# Patient Record
Sex: MALE | Race: WHITE | NOT HISPANIC OR LATINO | Employment: OTHER | URBAN - NONMETROPOLITAN AREA
[De-identification: names, ages, dates, MRNs, and addresses within clinical notes are randomized per-mention and may not be internally consistent; named-entity substitution may affect disease eponyms.]

---

## 2017-01-01 DIAGNOSIS — I25.10 CORONARY ARTERY DISEASE INVOLVING NATIVE HEART WITHOUT ANGINA PECTORIS, UNSPECIFIED VESSEL OR LESION TYPE: ICD-10-CM

## 2017-01-03 DIAGNOSIS — I10 ESSENTIAL HYPERTENSION: ICD-10-CM

## 2017-01-03 RX ORDER — RAMIPRIL 5 MG/1
5 CAPSULE ORAL DAILY
Qty: 90 CAPSULE | Refills: 3 | Status: SHIPPED | OUTPATIENT
Start: 2017-01-03 | End: 2018-01-06 | Stop reason: SDUPTHER

## 2017-01-03 RX ORDER — CLOPIDOGREL BISULFATE 75 MG/1
TABLET ORAL
Qty: 30 TABLET | Refills: 5 | Status: SHIPPED | OUTPATIENT
Start: 2017-01-03 | End: 2017-01-26 | Stop reason: SDUPTHER

## 2017-01-25 DIAGNOSIS — E78.2 MIXED HYPERLIPIDEMIA: ICD-10-CM

## 2017-01-25 RX ORDER — SIMVASTATIN 40 MG
TABLET ORAL
Qty: 90 TABLET | Refills: 3 | Status: SHIPPED | OUTPATIENT
Start: 2017-01-25 | End: 2018-03-06 | Stop reason: SDUPTHER

## 2017-01-26 DIAGNOSIS — I25.10 CORONARY ARTERY DISEASE INVOLVING NATIVE HEART WITHOUT ANGINA PECTORIS, UNSPECIFIED VESSEL OR LESION TYPE: ICD-10-CM

## 2017-01-26 RX ORDER — CLOPIDOGREL BISULFATE 75 MG/1
TABLET ORAL
Qty: 30 TABLET | Refills: 5 | Status: SHIPPED | OUTPATIENT
Start: 2017-01-26 | End: 2018-01-02 | Stop reason: SDUPTHER

## 2017-02-02 ENCOUNTER — OFFICE VISIT (OUTPATIENT)
Dept: OTOLARYNGOLOGY | Facility: CLINIC | Age: 82
End: 2017-02-02

## 2017-02-02 VITALS
WEIGHT: 216 LBS | DIASTOLIC BLOOD PRESSURE: 68 MMHG | BODY MASS INDEX: 33.9 KG/M2 | HEIGHT: 67 IN | TEMPERATURE: 97.8 F | RESPIRATION RATE: 19 BRPM | SYSTOLIC BLOOD PRESSURE: 130 MMHG | HEART RATE: 80 BPM

## 2017-02-02 DIAGNOSIS — D49.9 NEOPLASM: ICD-10-CM

## 2017-02-02 DIAGNOSIS — C44.91 BASAL CELL CARCINOMA: Primary | ICD-10-CM

## 2017-02-02 PROCEDURE — 99203 OFFICE O/P NEW LOW 30 MIN: CPT | Performed by: OTOLARYNGOLOGY

## 2017-02-02 RX ORDER — POTASSIUM CHLORIDE 20 MEQ/1
TABLET, EXTENDED RELEASE ORAL
COMMUNITY
Start: 2016-12-02

## 2017-02-02 RX ORDER — THEOPHYLLINE 300 MG/1
TABLET, EXTENDED RELEASE ORAL EVERY 8 HOURS
COMMUNITY

## 2017-02-02 RX ORDER — CETIRIZINE HYDROCHLORIDE 10 MG/1
TABLET ORAL
Refills: 2 | COMMUNITY
Start: 2017-01-24

## 2017-02-02 RX ORDER — SIMVASTATIN 40 MG
TABLET ORAL
COMMUNITY
Start: 2017-01-25

## 2017-02-02 RX ORDER — ALBUTEROL SULFATE 90 UG/1
AEROSOL, METERED RESPIRATORY (INHALATION) EVERY 4 HOURS
COMMUNITY

## 2017-02-02 RX ORDER — MELOXICAM 15 MG/1
TABLET ORAL
COMMUNITY

## 2017-02-02 RX ORDER — MONTELUKAST SODIUM 10 MG/1
TABLET ORAL
COMMUNITY
End: 2020-03-30 | Stop reason: SDUPTHER

## 2017-02-02 RX ORDER — CLOPIDOGREL BISULFATE 75 MG/1
TABLET ORAL
COMMUNITY
Start: 2017-01-26

## 2017-02-02 RX ORDER — OMEPRAZOLE 20 MG/1
CAPSULE, DELAYED RELEASE ORAL
COMMUNITY
End: 2017-06-08

## 2017-02-02 RX ORDER — NITROGLYCERIN 0.4 MG/1
TABLET SUBLINGUAL
COMMUNITY
Start: 2013-06-07

## 2017-02-02 RX ORDER — ALBUTEROL SULFATE 2.5 MG/3ML
SOLUTION RESPIRATORY (INHALATION)
Refills: 12 | COMMUNITY
Start: 2017-01-24

## 2017-02-02 RX ORDER — BUMETANIDE 1 MG/1
TABLET ORAL
COMMUNITY
Start: 2016-06-27

## 2017-02-02 RX ORDER — RAMIPRIL 5 MG/1
CAPSULE ORAL
COMMUNITY
Start: 2017-01-03

## 2017-02-02 RX ORDER — MULTIVIT WITH MINERALS/LUTEIN
TABLET ORAL
COMMUNITY

## 2017-02-02 RX ORDER — CARVEDILOL 12.5 MG/1
TABLET ORAL
COMMUNITY
Start: 2016-06-14

## 2017-02-02 NOTE — PROGRESS NOTES
Name:  Sly Marin  YOB: 1935  Location: [facility]  Location Address: [address] [place] [address], [facility], [address] [place] [address]  Location Phone: [phone]    Chief Complaint  Chief Complaint   Patient presents with   • Skin Lesion     BCC left cheek        History of Present Illness  The patient  is a 81 y.o. male who is referred by Kayla Barrera MD for preoperative evaluation. He complains of a lesion of the left cheek present for several months. The lesion has been biopsied and pathology demonstrated a BCCa. The patient denies bleeding, scaling and rapid enlargement.          Past Medical History   Diagnosis Date   • Allergic rhinitis    • Anosmia    • Arthritis    • Asthma    • Candidiasis of mouth    • Coronary artery disease    • Deviated nasal septum    • Gastroesophageal reflux    • Geographic tongue    • Hypercholesterolemia    • Hypertension    • Hypertrophy of nasal turbinates    • Laryngopharyngeal reflux    • Smell disorder        Past Surgical History   Procedure Laterality Date   • Coronary angioplasty with stent placement     • Coronary artery bypass graft     • Other surgical history       Elbow         Current Outpatient Prescriptions:   •  albuterol (PROVENTIL HFA;VENTOLIN HFA) 108 (90 BASE) MCG/ACT inhaler, Every 4 (Four) Hours., Disp: , Rfl:   •  albuterol (PROVENTIL) (2.5 MG/3ML) 0.083% nebulizer solution, INHALE 1 AMPUL BY INHALATION ROUTE AS NEEDED 4 TIMES PER DAY PRN, Disp: , Rfl: 12  •  aspirin 81 MG tablet, Take 81 mg by mouth daily.  , Disp: , Rfl:   •  bumetanide (BUMEX) 1 MG tablet, Take 1 tablet by mouth daily, Disp: , Rfl:   •  carvedilol (COREG) 12.5 MG tablet, Take 1 tablet by mouth 2 times daily (with meals), Disp: , Rfl:   •  cetirizine (zyrTEC) 10 MG tablet, TAKE 1 TABLET (10 MG) BY ORAL ROUTE ONCE DAILY AS NEEDED, Disp: , Rfl: 2  •  clopidogrel (PLAVIX) 75 MG tablet, TAKE 1 TABLET BY MOUTH DAILY, Disp: , Rfl:   •  meloxicam (MOBIC) 15 MG  tablet, Take 15 mg by mouth daily., Disp: , Rfl:   •  montelukast (SINGULAIR) 10 MG tablet, Take 10 mg by mouth nightly.  , Disp: , Rfl:   •  Multiple Vitamins-Minerals (CENTRUM SILVER) tablet, Take 1 tablet by mouth daily., Disp: , Rfl:   •  nitroglycerin (NITROSTAT) 0.4 MG SL tablet, Place 1 tablet under the tongue every 5 minutes as needed for Chest pain., Disp: , Rfl:   •  omeprazole (PRILOSEC) 20 MG capsule, Take 20 mg by mouth daily., Disp: , Rfl:   •  potassium chloride (KLOR-CON) 20 MEQ CR tablet, Take 1 tablet by mouth daily, Disp: , Rfl:   •  ramipril (ALTACE) 5 MG capsule, Take 1 capsule by mouth daily, Disp: , Rfl:   •  simvastatin (ZOCOR) 40 MG tablet, TAKE 1 TABLET BY MOUTH EVERY NIGHT, Disp: , Rfl:   •  theophylline (THEODUR) 300 MG 12 hr tablet, Every 8 (Eight) Hours., Disp: , Rfl:     Adhesive tape    Family History   Problem Relation Age of Onset   • Heart disease Mother    • Cancer Father        Social History     Social History   • Marital status:      Spouse name: N/A   • Number of children: N/A   • Years of education: N/A     Occupational History   • Not on file.     Social History Main Topics   • Smoking status: Former Smoker   • Smokeless tobacco: Not on file   • Alcohol use Yes      Comment: occasionally   • Drug use: Defer   • Sexual activity: Not on file     Other Topics Concern   • Not on file     Social History Narrative       Review of Systems   Constitutional: Negative.    HENT: Negative.    Eyes: Negative.    Respiratory: Negative.    Cardiovascular: Negative.    Gastrointestinal: Negative.    Endocrine: Negative.    Genitourinary: Negative.    Musculoskeletal: Negative.    Skin:        Left cheek lesion   Allergic/Immunologic: Negative.    Neurological: Negative.    Hematological: Negative.    Psychiatric/Behavioral: Negative.        Vitals:    02/02/17 1055   BP: 130/68   Pulse: 80   Resp: 19   Temp: 97.8 °F (36.6 °C)       Objective     Physical Exam     CONSTITUTIONAL:  "Morbidly obese, well-developed, alert, oriented, in no acute distress     COMMUNICATION AND VOICE: able to communicate normally, normal voice quality    HEAD: normocephalic, no lesions, atraumatic, no tenderness, no masses     FACE: appearance normal, 1.2 cm RPP the left cheek, no tenderness, no deformities, facial motion symmetric    EYES: ocular motility normal, eyelids normal, orbits normal, no proptosis, conjunctiva normal , pupils equal, round     EARS:  Hearing: hearing to conversational voice intact bilaterally   External Ears: normal bilaterally, no lesions    NOSE:  External Nose: external nasal structure normal, no tenderness on palpation, no nasal discharge, no lesions, no evidence of trauma, nostrils patent     ORAL:  Lips: upper and lower lips without lesion     NECK:  Inspection and Palpation: neck appearance normal, no masses or tenderness    CHEST/RESPIRATORY: normal respiratory effort     CARDIOVASCULAR: no cyanosis or edema     NEUROLOGICAL/PSYCHIATRIC: oriented to time, place and person, mood normal, affect appropriate, CN II-XII intact grossly    Assessment/Plan   Sly was seen today for skin lesion.    Diagnoses and all orders for this visit:    Basal cell carcinoma  Comments:  Left cheek      * Surgery not found *  No orders of the defined types were placed in this encounter.    No Follow-up on file.       Patient Instructions   The risks, benefits and options of the procedure were explained to the patient. The possiblity of a persistent cosmetic defect as well as a \"flap\" or a graft to close the defect was discussed. The patient was instructed to stop all aspirin, NSAIDs and VIT E etc.  If appropriate, clearance with primary MD or specialist will be obtained preoperatively.  The patient was scheduled for a LOCAL/MAC Anesthesia with a frozen section for margin control.    For instructions on the proper use, care and disposal of controled substances, ask you doctor or refer to the [place] Board of " Medicine website: http://kbml.[place].gov/hb1/Pages/Considerations-For-Patient-Education.aspx

## 2017-02-02 NOTE — PATIENT INSTRUCTIONS
"The risks, benefits and options of the procedure were explained to the patient. The possiblity of a persistent cosmetic defect as well as a \"flap\" or a graft to close the defect was discussed. The patient was instructed to stop all aspirin, NSAIDs and VIT E etc.  If appropriate, clearance with primary MD or specialist will be obtained preoperatively.  The patient was scheduled for a LOCAL/MAC Anesthesia with a frozen section for margin control.    For instructions on the proper use, care and disposal of controled substances, ask you doctor or refer to the [place] Board of Medicine website: http://kbml.[place].gov/hb1/Pages/Considerations-For-Patient-Education.aspx    "

## 2017-02-03 ENCOUNTER — TELEPHONE (OUTPATIENT)
Dept: OTOLARYNGOLOGY | Facility: CLINIC | Age: 82
End: 2017-02-03

## 2017-02-06 ENCOUNTER — TELEPHONE (OUTPATIENT)
Dept: OTOLARYNGOLOGY | Facility: CLINIC | Age: 82
End: 2017-02-06

## 2017-02-09 ENCOUNTER — HOSPITAL ENCOUNTER (OUTPATIENT)
Dept: GENERAL RADIOLOGY | Facility: HOSPITAL | Age: 82
Discharge: HOME | End: 2017-02-09
Admitting: OTOLARYNGOLOGY

## 2017-02-09 ENCOUNTER — APPOINTMENT (OUTPATIENT)
Dept: PREADMISSION TESTING | Facility: HOSPITAL | Age: 82
End: 2017-02-09

## 2017-02-09 VITALS
BODY MASS INDEX: 34.06 KG/M2 | SYSTOLIC BLOOD PRESSURE: 159 MMHG | WEIGHT: 217 LBS | HEART RATE: 69 BPM | DIASTOLIC BLOOD PRESSURE: 76 MMHG | RESPIRATION RATE: 18 BRPM | HEIGHT: 67 IN | OXYGEN SATURATION: 98 %

## 2017-02-09 DIAGNOSIS — D49.9 NEOPLASM: ICD-10-CM

## 2017-02-09 DIAGNOSIS — C44.91 BASAL CELL CARCINOMA: ICD-10-CM

## 2017-02-09 LAB
ALBUMIN SERPL-MCNC: 4.2 G/DL (ref 3.5–5)
ALBUMIN/GLOB SERPL: 1.4 G/DL (ref 1.1–2.5)
ALP SERPL-CCNC: 76 U/L (ref 24–120)
ALT SERPL W P-5'-P-CCNC: 34 U/L (ref 0–54)
ANION GAP SERPL CALCULATED.3IONS-SCNC: 9 MMOL/L (ref 4–13)
APTT PPP: 31.4 SECONDS (ref 24.1–34.8)
AST SERPL-CCNC: 27 U/L (ref 7–45)
BASOPHILS # BLD AUTO: 0.05 10*3/MM3 (ref 0–0.2)
BASOPHILS NFR BLD AUTO: 0.8 % (ref 0–2)
BILIRUB SERPL-MCNC: 0.5 MG/DL (ref 0.1–1)
BUN BLD-MCNC: 23 MG/DL (ref 5–21)
BUN/CREAT SERPL: 17.3 (ref 7–25)
CALCIUM SPEC-SCNC: 9.1 MG/DL (ref 8.4–10.4)
CHLORIDE SERPL-SCNC: 102 MMOL/L (ref 98–110)
CO2 SERPL-SCNC: 29 MMOL/L (ref 24–31)
CREAT BLD-MCNC: 1.33 MG/DL (ref 0.5–1.4)
DEPRECATED RDW RBC AUTO: 46.9 FL (ref 40–54)
EOSINOPHIL # BLD AUTO: 0.11 10*3/MM3 (ref 0–0.7)
EOSINOPHIL NFR BLD AUTO: 1.7 % (ref 0–4)
ERYTHROCYTE [DISTWIDTH] IN BLOOD BY AUTOMATED COUNT: 14.1 % (ref 12–15)
GFR SERPL CREATININE-BSD FRML MDRD: 52 ML/MIN/1.73
GLOBULIN UR ELPH-MCNC: 2.9 GM/DL
GLUCOSE BLD-MCNC: 142 MG/DL (ref 70–100)
HCT VFR BLD AUTO: 40.2 % (ref 40–52)
HGB BLD-MCNC: 13.2 G/DL (ref 14–18)
IMM GRANULOCYTES # BLD: 0.02 10*3/MM3 (ref 0–0.03)
IMM GRANULOCYTES NFR BLD: 0.3 % (ref 0–5)
INR PPP: 0.94 (ref 0.91–1.09)
LYMPHOCYTES # BLD AUTO: 1.46 10*3/MM3 (ref 0.72–4.86)
LYMPHOCYTES NFR BLD AUTO: 23 % (ref 15–45)
MCH RBC QN AUTO: 29.8 PG (ref 28–32)
MCHC RBC AUTO-ENTMCNC: 32.8 G/DL (ref 33–36)
MCV RBC AUTO: 90.7 FL (ref 82–95)
MONOCYTES # BLD AUTO: 0.66 10*3/MM3 (ref 0.19–1.3)
MONOCYTES NFR BLD AUTO: 10.4 % (ref 4–12)
NEUTROPHILS # BLD AUTO: 4.05 10*3/MM3 (ref 1.87–8.4)
NEUTROPHILS NFR BLD AUTO: 63.8 % (ref 39–78)
PLATELET # BLD AUTO: 146 10*3/MM3 (ref 130–400)
PMV BLD AUTO: 11.4 FL (ref 6–12)
POTASSIUM BLD-SCNC: 4.3 MMOL/L (ref 3.5–5.3)
PROT SERPL-MCNC: 7.1 G/DL (ref 6.3–8.7)
PROTHROMBIN TIME: 12.8 SECONDS (ref 11.9–14.6)
RBC # BLD AUTO: 4.43 10*6/MM3 (ref 4.8–5.9)
SODIUM BLD-SCNC: 140 MMOL/L (ref 135–145)
WBC NRBC COR # BLD: 6.35 10*3/MM3 (ref 4.8–10.8)

## 2017-02-09 PROCEDURE — 80053 COMPREHEN METABOLIC PANEL: CPT | Performed by: OTOLARYNGOLOGY

## 2017-02-09 PROCEDURE — 71020 HC CHEST PA AND LATERAL: CPT

## 2017-02-09 PROCEDURE — 85025 COMPLETE CBC W/AUTO DIFF WBC: CPT | Performed by: OTOLARYNGOLOGY

## 2017-02-09 PROCEDURE — 85610 PROTHROMBIN TIME: CPT | Performed by: OTOLARYNGOLOGY

## 2017-02-09 PROCEDURE — 93010 ELECTROCARDIOGRAM REPORT: CPT | Performed by: INTERNAL MEDICINE

## 2017-02-09 PROCEDURE — 36415 COLL VENOUS BLD VENIPUNCTURE: CPT

## 2017-02-09 PROCEDURE — 85730 THROMBOPLASTIN TIME PARTIAL: CPT | Performed by: OTOLARYNGOLOGY

## 2017-02-09 PROCEDURE — 93005 ELECTROCARDIOGRAM TRACING: CPT

## 2017-02-09 NOTE — DISCHARGE INSTRUCTIONS
DAY OF SURGERY INSTRUCTIONS        YOUR SURGEON: ***RUDDY COLEMAN     PROCEDURE: ***EXCISION LESION    DATE OF SURGERY: ***FEBRUARY 16,2017    ARRIVAL TIME: AS DIRECTED BY OFFICE    DAY OF SURGERY TAKE ONLY THESE MEDICATIONS: ***COREG            BEFORE YOU COME TO THE HOSPITAL  (Pre-op instructions)  • Do not eat, drink, smoke or chew gum after midnight the night before surgery.  This also includes no mints.  • Morning of surgery take only the medicines you have been instructed with a sip of water unless otherwise instructed  by your physician.  • Do not shave, wear makeup or dark nail polish.  • Remove all jewelry including rings.  • Leave anything you consider valuable at home.  • Leave your suitcase in the car until after your surgery.  • Bring the following with you if applicable:  o Picture ID and insurance, Medicare or Medicaid cards  o Co-pay/deductible required by insurance (cash, check, credit card)  o Medications (no narcotics) in original bottles (not a list) including all over-the-counter medications.  o Copy of advance directive, living will or power-of- documents if not brought to PAT  o CPAP or BIPAP mask and tubing  o Skin prep instruction sheet  o Relaxation aids (MP3 player, book, magazine)  • Confirm your arrival time with you surgeon the day before your surgery (surgery times are subject to change)  • On the day of surgery check in at registration located at the main entrance of the hospital.       Outpatient Surgery Guidelines, Adult  Outpatient procedures are those for which the person having the procedure is allowed to go home the same day as the procedure. Various procedures are done on an outpatient basis. You should follow some general guidelines if you will be having an outpatient procedure.  LET YOUR HEALTH CARE PROVIDER KNOW ABOUT:  · Any allergies you have.  · All medicines you are taking, including vitamins, herbs, eye drops, creams, and over-the-counter medicines.  · Previous  problems you or members of your family have had with the use of anesthetics.  · Any blood disorders you have.  · Previous surgeries you have had.  · Medical conditions you have.  RISKS AND COMPLICATIONS  Your health care provider will discuss possible risks and complications with you before surgery. Common risks and complications include:    · Problems due to the use of anesthetics.  · Blood loss and replacement (does not apply to minor surgical procedures).  · Temporary increase in pain due to surgery.  · Uncorrected pain or problems that the surgery was meant to correct.  · Infection.  · New damage.  BEFORE THE PROCEDURE  · Ask your health care provider about changing or stopping your regular medicines. You may need to stop taking certain medicines in the days or weeks before the procedure.  · Stop smoking at least 2 weeks before surgery. This lowers your risk for complications during and after surgery. Ask your health care provider for help with this if needed.  · Eat your usual meals and a light supper the day before surgery. Continue fluid intake. Do not drink alcohol.  · Do not eat or drink after midnight the night before your surgery.   · Arrange for someone to take you home and to stay with you for 24 hours after the procedure. Medicine given for your procedure may affect your ability to drive or to care for yourself.  · Call your health care provider's office if you develop an illness or problem that may prevent you from safely having your procedure.  AFTER THE PROCEDURE  After surgery, you will be taken to a recovery area, where your progress will be monitored. If there are no complications, you will be allowed to go home when you are awake, stable, and taking fluids well. You may have numbness around the surgical site. Healing will take some time. You will have tenderness at the surgical site and may have some swelling and bruising. You may also have some nausea.  HOME CARE INSTRUCTIONS  · Do not drive  for 24 hours, or as directed by your health care provider. Do not drive while taking prescription pain medicines.  · Do not drink alcohol for 24 hours.  · Do not make important decisions or sign legal documents for 24 hours.  · You may resume a normal diet and activities as directed.  · Do not lift anything heavier than 10 pounds (4.5 kg) or play contact sports until your health care provider says it is okay.  · Change your bandages (dressings) as directed.  · Only take over-the-counter or prescription medicines as directed by your health care provider.  · Follow up with your health care provider as directed.  SEEK MEDICAL CARE IF:  · You have increased bleeding (more than a small spot) from the surgical site.  · You have redness, swelling, or increasing pain in the wound.  · You see pus coming from the wound.  · You have a fever.  · You notice a bad smell coming from the wound or dressing.  · You feel lightheaded or faint.  · You develop a rash.  · You have trouble breathing.  · You develop allergies.  MAKE SURE YOU:  · Understand these instructions.  · Will watch your condition.  · Will get help right away if you are not doing well or get worse.     This information is not intended to replace advice given to you by your health care provider. Make sure you discuss any questions you have with your health care provider.     Document Released: 09/12/2002 Document Revised: 05/03/2016 Document Reviewed: 05/22/2014  [employer] Interactive Patient Education ©2016 [employer]       Fall Prevention in Hospitals, Adult  As a hospital patient, your condition and the treatments you receive can increase your risk for falls. Some additional risk factors for falls in a hospital include:  · Being in an unfamiliar environment.  · Being on bed rest.  · Your surgery.  · Taking certain medicines.  · Your tubing requirements, such as intravenous (IV) therapy or catheters.  It is important that you learn how to decrease fall risks while  at the hospital. Below are important tips that can help prevent falls.  SAFETY TIPS FOR PREVENTING FALLS  Talk about your risk of falling.  · Ask your health care provider why you are at risk for falling. Is it your medicine, illness, tubing placement, or something else?  · Make a plan with your health care provider to keep you safe from falls.  · Ask your health care provider or pharmacist about side effects of your medicines. Some medicines can make you dizzy or affect your coordination.  Ask for help.  · Ask for help before getting out of bed. You may need to press your call button.  · Ask for assistance in getting safely to the toilet.  · Ask for a walker or cane to be put at your bedside. Ask that most of the side rails on your bed be placed up before your health care provider leaves the room.  · Ask family or friends to sit with you.  · Ask for things that are out of your reach, such as your glasses, hearing aids, telephone, bedside table, or call button.  Follow these tips to avoid falling:  · Stay lying or seated, rather than standing, while waiting for help.  · Wear rubber-soled slippers or shoes whenever you walk in the hospital.  · Avoid quick, sudden movements.  ¨ Change positions slowly.  ¨ Sit on the side of your bed before standing.  ¨ Stand up slowly and wait before you start to walk.  · Let your health care provider know if there is a spill on the floor.  · Pay careful attention to the medical equipment, electrical cords, and tubes around you.  · When you need help, use your call button by your bed or in the bathroom. Wait for one of your health care providers to help you.  · If you feel dizzy or unsure of your footing, return to bed and wait for assistance.  · Avoid being distracted by the TV, telephone, or another person in your room.  · Do not lean or support yourself on rolling objects, such as IV poles or bedside tables.     This information is not intended to replace advice given to you by  your health care provider. Make sure you discuss any questions you have with your health care provider.     Document Released: 12/15/2001 Document Revised: 01/08/2016 Document Reviewed: 08/25/2013  [employer] Interactive Patient Education ©2016 [employer]       Surgical Site Infections FAQs  What is a Surgical Site Infection (SSI)?  A surgical site infection is an infection that occurs after surgery in the part of the body where the surgery took place. Most patients who have surgery do not develop an infection. However, infections develop in about 1 to 3 out of every 100 patients who have surgery.  Some of the common symptoms of a surgical site infection are:  · Redness and pain around the area where you had surgery  · Drainage of cloudy fluid from your surgical wound  · Fever  Can SSIs be treated?  Yes. Most surgical site infections can be treated with antibiotics. The antibiotic given to you depends on the bacteria (germs) causing the infection. Sometimes patients with SSIs also need another surgery to treat the infection.  What are some of the things that hospitals are doing to prevent SSIs?  To prevent SSIs, doctors, nurses, and other healthcare providers:  · Clean their hands and arms up to their elbows with an antiseptic agent just before the surgery.  · Clean their hands with soap and water or an alcohol-based hand rub before and after caring for each patient.  · May remove some of your hair immediately before your surgery using electric clippers if the hair is in the same area where the procedure will occur. They should not shave you with a razor.  · Wear special hair covers, masks, gowns, and gloves during surgery to keep the surgery area clean.  · Give you antibiotics before your surgery starts. In most cases, you should get antibiotics within 60 minutes before the surgery starts and the antibiotics should be stopped within 24 hours after surgery.  · Clean the skin at the site of your surgery with a  special soap that kills germs.  What can I do to help prevent SSIs?  Before your surgery:  · Tell your doctor about other medical problems you may have. Health problems such as allergies, diabetes, and obesity could affect your surgery and your treatment.  · Quit smoking. Patients who smoke get more infections. Talk to your doctor about how you can quit before your surgery.  · Do not shave near where you will have surgery. Shaving with a razor can irritate your skin and make it easier to develop an infection.  At the time of your surgery:  · Speak up if someone tries to shave you with a razor before surgery. Ask why you need to be shaved and talk with your surgeon if you have any concerns.  · Ask if you will get antibiotics before surgery.  After your surgery:  · Make sure that your healthcare providers clean their hands before examining you, either with soap and water or an alcohol-based hand rub.  · If you do not see your providers clean their hands, please ask them to do so.  · Family and friends who visit you should not touch the surgical wound or dressings.  · Family and friends should clean their hands with soap and water or an alcohol-based hand rub before and after visiting you. If you do not see them clean their hands, ask them to clean their hands.  What do I need to do when I go home from the hospital?  · Before you go home, your doctor or nurse should explain everything you need to know about taking care of your wound. Make sure you understand how to care for your wound before you leave the hospital.  · Always clean your hands before and after caring for your wound.  · Before you go home, make sure you know who to contact if you have questions or problems after you get home.  · If you have any symptoms of an infection, such as redness and pain at the surgery site, drainage, or fever, call your doctor immediately.  If you have additional questions, please ask your doctor or nurse.  Developed and  co-sponsored by The Society for Healthcare Epidemiology of [place] (SHEA); Infectious Diseases Society of [place] (IDSA); American Hospital Association; Association for Professionals in Infection Control and Epidemiology (APIC); Centers for Disease Control and Prevention (CDC); and The Joint Commission.     This information is not intended to replace advice given to you by your health care provider. Make sure you discuss any questions you have with your health care provider.     Document Released: 12/23/2014 Document Revised: 01/08/2016 Document Reviewed: 03/02/2016  [employer] Interactive Patient Education ©2016 [employer]     PATIENT/FAMILY/RESPONSIBLE PARTY VERBALIZES UNDERSTANDING OF ABOVE EDUCATION

## 2017-02-10 ENCOUNTER — TELEPHONE (OUTPATIENT)
Dept: OTOLARYNGOLOGY | Facility: CLINIC | Age: 82
End: 2017-02-10

## 2017-02-10 NOTE — TELEPHONE ENCOUNTER
2/14/17 I have received clearance letter for patient to d/c aspirin and plavix 7 days prior to surgery.  Patient notified.

## 2017-02-23 ENCOUNTER — ANESTHESIA EVENT (OUTPATIENT)
Dept: PERIOP | Facility: HOSPITAL | Age: 82
End: 2017-02-23

## 2017-02-23 ENCOUNTER — ANESTHESIA (OUTPATIENT)
Dept: PERIOP | Facility: HOSPITAL | Age: 82
End: 2017-02-23

## 2017-02-23 ENCOUNTER — HOSPITAL ENCOUNTER (OUTPATIENT)
Facility: HOSPITAL | Age: 82
Setting detail: HOSPITAL OUTPATIENT SURGERY
Discharge: HOME | End: 2017-02-23
Attending: OTOLARYNGOLOGY | Admitting: OTOLARYNGOLOGY

## 2017-02-23 VITALS
HEART RATE: 69 BPM | DIASTOLIC BLOOD PRESSURE: 73 MMHG | TEMPERATURE: 96 F | OXYGEN SATURATION: 98 % | RESPIRATION RATE: 16 BRPM | SYSTOLIC BLOOD PRESSURE: 158 MMHG

## 2017-02-23 DIAGNOSIS — C44.91 BASAL CELL CARCINOMA: ICD-10-CM

## 2017-02-23 PROCEDURE — 25010000002 PROPOFOL 10 MG/ML EMULSION: Performed by: NURSE ANESTHETIST, CERTIFIED REGISTERED

## 2017-02-23 PROCEDURE — 88305 TISSUE EXAM BY PATHOLOGIST: CPT | Performed by: OTOLARYNGOLOGY

## 2017-02-23 PROCEDURE — 25010000002 FENTANYL CITRATE (PF) 100 MCG/2ML SOLUTION: Performed by: NURSE ANESTHETIST, CERTIFIED REGISTERED

## 2017-02-23 PROCEDURE — 14040 TIS TRNFR F/C/C/M/N/A/G/H/F: CPT | Performed by: OTOLARYNGOLOGY

## 2017-02-23 PROCEDURE — 25010000002 MIDAZOLAM PER 1 MG: Performed by: NURSE ANESTHETIST, CERTIFIED REGISTERED

## 2017-02-23 PROCEDURE — 88331 PATH CONSLTJ SURG 1 BLK 1SPC: CPT | Performed by: OTOLARYNGOLOGY

## 2017-02-23 RX ORDER — ONDANSETRON 2 MG/ML
4 INJECTION INTRAMUSCULAR; INTRAVENOUS ONCE AS NEEDED
Status: CANCELLED | OUTPATIENT
Start: 2017-02-23

## 2017-02-23 RX ORDER — FAMOTIDINE 10 MG/ML
20 INJECTION, SOLUTION INTRAVENOUS
Status: DISCONTINUED | OUTPATIENT
Start: 2017-02-23 | End: 2017-02-23 | Stop reason: HOSPADM

## 2017-02-23 RX ORDER — DIPHENHYDRAMINE HYDROCHLORIDE 50 MG/ML
12.5 INJECTION INTRAMUSCULAR; INTRAVENOUS
Status: CANCELLED | OUTPATIENT
Start: 2017-02-23

## 2017-02-23 RX ORDER — MIDAZOLAM HYDROCHLORIDE 1 MG/ML
2 INJECTION INTRAMUSCULAR; INTRAVENOUS
Status: DISCONTINUED | OUTPATIENT
Start: 2017-02-23 | End: 2017-02-23 | Stop reason: HOSPADM

## 2017-02-23 RX ORDER — NALOXONE HCL 0.4 MG/ML
0.4 VIAL (ML) INJECTION AS NEEDED
Status: CANCELLED | OUTPATIENT
Start: 2017-02-23

## 2017-02-23 RX ORDER — HYDROCODONE BITARTRATE AND ACETAMINOPHEN 5; 325 MG/1; MG/1
1 TABLET ORAL ONCE AS NEEDED
Status: CANCELLED | OUTPATIENT
Start: 2017-02-23 | End: 2017-03-05

## 2017-02-23 RX ORDER — IPRATROPIUM BROMIDE AND ALBUTEROL SULFATE 2.5; .5 MG/3ML; MG/3ML
3 SOLUTION RESPIRATORY (INHALATION) ONCE AS NEEDED
Status: CANCELLED | OUTPATIENT
Start: 2017-02-23

## 2017-02-23 RX ORDER — FENTANYL CITRATE 50 UG/ML
25 INJECTION, SOLUTION INTRAMUSCULAR; INTRAVENOUS
Status: CANCELLED | OUTPATIENT
Start: 2017-02-23 | End: 2017-02-23

## 2017-02-23 RX ORDER — MIDAZOLAM HYDROCHLORIDE 1 MG/ML
1 INJECTION INTRAMUSCULAR; INTRAVENOUS
Status: DISCONTINUED | OUTPATIENT
Start: 2017-02-23 | End: 2017-02-23 | Stop reason: HOSPADM

## 2017-02-23 RX ORDER — MEPERIDINE HYDROCHLORIDE 25 MG/ML
12.5 INJECTION INTRAMUSCULAR; INTRAVENOUS; SUBCUTANEOUS
Status: CANCELLED | OUTPATIENT
Start: 2017-02-23 | End: 2017-02-24

## 2017-02-23 RX ORDER — SODIUM CHLORIDE 0.9 % (FLUSH) 0.9 %
1-10 SYRINGE (ML) INJECTION AS NEEDED
Status: DISCONTINUED | OUTPATIENT
Start: 2017-02-23 | End: 2017-02-23 | Stop reason: HOSPADM

## 2017-02-23 RX ORDER — HYDRALAZINE HYDROCHLORIDE 20 MG/ML
5 INJECTION INTRAMUSCULAR; INTRAVENOUS
Status: CANCELLED | OUTPATIENT
Start: 2017-02-23

## 2017-02-23 RX ORDER — LIDOCAINE HYDROCHLORIDE AND EPINEPHRINE 10; 10 MG/ML; UG/ML
INJECTION, SOLUTION INFILTRATION; PERINEURAL AS NEEDED
Status: DISCONTINUED | OUTPATIENT
Start: 2017-02-23 | End: 2017-02-23 | Stop reason: HOSPADM

## 2017-02-23 RX ORDER — MORPHINE SULFATE 2 MG/ML
2 INJECTION, SOLUTION INTRAMUSCULAR; INTRAVENOUS
Status: CANCELLED | OUTPATIENT
Start: 2017-02-23 | End: 2017-02-23

## 2017-02-23 RX ORDER — DEXTROSE MONOHYDRATE 25 G/50ML
12.5 INJECTION, SOLUTION INTRAVENOUS AS NEEDED
Status: DISCONTINUED | OUTPATIENT
Start: 2017-02-23 | End: 2017-02-23 | Stop reason: HOSPADM

## 2017-02-23 RX ORDER — SODIUM CHLORIDE, SODIUM LACTATE, POTASSIUM CHLORIDE, CALCIUM CHLORIDE 600; 310; 30; 20 MG/100ML; MG/100ML; MG/100ML; MG/100ML
9 INJECTION, SOLUTION INTRAVENOUS CONTINUOUS
Status: DISCONTINUED | OUTPATIENT
Start: 2017-02-23 | End: 2017-02-23 | Stop reason: HOSPADM

## 2017-02-23 RX ORDER — MIDAZOLAM HYDROCHLORIDE 1 MG/ML
INJECTION INTRAMUSCULAR; INTRAVENOUS AS NEEDED
Status: DISCONTINUED | OUTPATIENT
Start: 2017-02-23 | End: 2017-02-23 | Stop reason: SURG

## 2017-02-23 RX ORDER — PROPOFOL 10 MG/ML
VIAL (ML) INTRAVENOUS AS NEEDED
Status: DISCONTINUED | OUTPATIENT
Start: 2017-02-23 | End: 2017-02-23 | Stop reason: SURG

## 2017-02-23 RX ORDER — HYDROCODONE BITARTRATE AND ACETAMINOPHEN 5; 325 MG/1; MG/1
1 TABLET ORAL EVERY 4 HOURS PRN
Qty: 8 TABLET | Refills: 0 | Status: SHIPPED | OUTPATIENT
Start: 2017-02-23 | End: 2017-06-08

## 2017-02-23 RX ORDER — FENTANYL CITRATE 50 UG/ML
25 INJECTION, SOLUTION INTRAMUSCULAR; INTRAVENOUS
Status: DISCONTINUED | OUTPATIENT
Start: 2017-02-23 | End: 2017-02-23 | Stop reason: HOSPADM

## 2017-02-23 RX ORDER — FENTANYL CITRATE 50 UG/ML
INJECTION, SOLUTION INTRAMUSCULAR; INTRAVENOUS AS NEEDED
Status: DISCONTINUED | OUTPATIENT
Start: 2017-02-23 | End: 2017-02-23 | Stop reason: SURG

## 2017-02-23 RX ORDER — LABETALOL HYDROCHLORIDE 5 MG/ML
5 INJECTION, SOLUTION INTRAVENOUS
Status: CANCELLED | OUTPATIENT
Start: 2017-02-23

## 2017-02-23 RX ADMIN — FAMOTIDINE 20 MG: 10 INJECTION, SOLUTION INTRAVENOUS at 06:54

## 2017-02-23 RX ADMIN — MIDAZOLAM HYDROCHLORIDE 1 MG: 1 INJECTION, SOLUTION INTRAMUSCULAR; INTRAVENOUS at 07:13

## 2017-02-23 RX ADMIN — SODIUM CHLORIDE, POTASSIUM CHLORIDE, SODIUM LACTATE AND CALCIUM CHLORIDE 9 ML/HR: 600; 310; 30; 20 INJECTION, SOLUTION INTRAVENOUS at 06:50

## 2017-02-23 RX ADMIN — LIDOCAINE HYDROCHLORIDE 0.5 ML: 10 INJECTION, SOLUTION EPIDURAL; INFILTRATION; INTRACAUDAL; PERINEURAL at 06:50

## 2017-02-23 RX ADMIN — FENTANYL CITRATE 50 MCG: 50 INJECTION, SOLUTION INTRAMUSCULAR; INTRAVENOUS at 07:05

## 2017-02-23 RX ADMIN — PROPOFOL 50 MG: 10 INJECTION, EMULSION INTRAVENOUS at 07:10

## 2017-02-23 RX ADMIN — FENTANYL CITRATE 50 MCG: 50 INJECTION, SOLUTION INTRAMUSCULAR; INTRAVENOUS at 07:13

## 2017-02-23 NOTE — ANESTHESIA PREPROCEDURE EVALUATION
Anesthesia Evaluation     Patient summary reviewed   no history of anesthetic complications:  NPO Status: > 8 hours   Airway   Mallampati: II  TM distance: >3 FB  Neck ROM: full  Dental    (+) upper dentures and lower dentures    Pulmonary    (-) asthma, sleep apnea, not a smoker  Cardiovascular   Exercise tolerance: good (4-7 METS)    ECG reviewed  Patient on routine beta blocker and Beta blocker given within 24 hours of surgery    (+) hypertension, CAD, CABG (1996), cardiac stents (10/2015)   (-) pacemaker, angina      Neuro/Psych  (-) seizures, TIA, CVA  GI/Hepatic/Renal/Endo    (+) obesity,  GERD,   (-) liver disease, renal disease, diabetes    Musculoskeletal     Abdominal    Substance History      OB/GYN          Other                                    Anesthesia Plan    ASA 3     general     intravenous induction   Anesthetic plan and risks discussed with patient.

## 2017-02-23 NOTE — OP NOTE
OPERATIVE NOTE  2/23/2017    NAME: Sly Marin    YOB: 1935  MRN: [id number]    PRE-OPERATIVE DIAGNOSIS:    Basal cell carcinoma of the left cheek    POST-OPERATIVE DIAGNOSIS:   Same    PROCEDURE PERFORMED:   Excision of basal cell carcinoma of the left cheek with transposition flap for closure    SURGEON:   Zhen Barrera MD    ASSISTANT(S):   None    ANESTHESIA:   MAC and Local Anesthesia with 1% Xylocaine with Epinephrine 1:100,000    INDICATIONS: The patient is a 81 y.o. male with Basal cell carcinoma [C44.91]    PROCEDURE:  The patient was brought to the operating room, given MAC and Local Anesthesia with 1% Xylocaine with Epinephrine 1:100,000, and prepped and draped in the usual manner.     Approximately 15 mL of 1% Xylocaine with epinephrine was injected subcutaneously total throughout the procedure and excision of biopsy-proven basal cell carcinoma of the left cheek was accomplished with a #15 blade.  The specimen was marked and submitted for frozen section examination.  The excision was approximately 2.0 x 2.0 cm and the lesion approximately 1.3 x 1.6 cm.  Frozen section examination demonstrated findings consistent with the margins me and free of involvement with tumor and no residual basal cell carcinoma was noted however ruptured infundibular cyst was appreciated.  Permanent sectioning and pathologic examination is pending.  An inferiorly based transposition flap was fashioned and wide undermining performed with curved iris scissors and double prong skin hooks.  Minimal bleeding was encountered which was controlled with electrocautery and low settings.  The flap was transposed and sutured into place utilizing interrupted 4-0 Vicryl subcutaneously and interrupted 5-0 nylon to reapproximate the epidermis.  Patient tolerated the procedure well following application Bactroban ointment the procedure was terminated.   The patient tolerated the procedure well without complications and was  transported to the postanesthesia care unit in stable condition.    SPECIMENS:    ID Type Source Tests Collected by Time Destination   A : Left cheek lesion short stitch-superior    long stitch-left lateral Tissue Cheek, Left TISSUE EXAM Zhen Barrera MD 2/23/2017 0722        COMPLICATIONS: NONE    ESTIMATED BLOOD LOSS:  Minimal    Zhen Barrera MD  2/23/2017

## 2017-02-23 NOTE — PLAN OF CARE
Problem: Patient Care Overview (Adult)  Goal: Plan of Care Review  Outcome: Outcome(s) achieved Date Met:  02/23/17 02/23/17 0927   Coping/Psychosocial Response Interventions   Plan Of Care Reviewed With patient;spouse   Patient Care Overview   Progress improving   Outcome Evaluation   Outcome Summary/Follow up Plan DC'D WITH PRESCRIPTION AND ANTIBIOTIC OINTMENT         Problem: Perioperative Period (Adult)  Goal: Signs and Symptoms of Listed Potential Problems Will be Absent or Manageable (Perioperative Period)  Outcome: Outcome(s) achieved Date Met:  02/23/17

## 2017-02-23 NOTE — ANESTHESIA POSTPROCEDURE EVALUATION
Patient: Sly Marin    Procedure Summary     Date Anesthesia Start Anesthesia Stop Room / Location    02/23/17 0704 0822  [facility] /  [facility]       Procedure Diagnosis Surgeon Provider    EXCISION LESION of the left cheek with frozen section with  flap (Left ); FLAP HEAD NECK (Left ) Basal cell carcinoma  (Basal cell carcinoma [C44.91]) MD Thomas Parham CRNA          Anesthesia Type: general  Last vitals  BP      Temp      Pulse     Resp      SpO2        Post Anesthesia Care and Evaluation    Patient location during evaluation: PHASE II  Patient participation: complete - patient participated  Level of consciousness: awake and alert  Pain score: 0  Pain management: adequate  Airway patency: patent  Anesthetic complications: No anesthetic complications    Cardiovascular status: acceptable, hemodynamically stable and stable  Respiratory status: acceptable  Hydration status: acceptable

## 2017-02-23 NOTE — DISCHARGE INSTRUCTIONS
Moderate Conscious Sedation, Adult, Care After  Refer to this sheet in the next few weeks. These instructions provide you with information on caring for yourself after your procedure. Your health care provider may also give you more specific instructions. Your treatment has been planned according to current medical practices, but problems sometimes occur. Call your health care provider if you have any problems or questions after your procedure.  WHAT TO EXPECT AFTER THE PROCEDURE    After your procedure:  · You may feel sleepy, clumsy, and have poor balance for several hours.  · Vomiting may occur if you eat too soon after the procedure.  HOME CARE INSTRUCTIONS  · Do not participate in any activities where you could become injured for at least 24 hours. Do not:  ¨ Drive.  ¨ Swim.  ¨ Ride a bicycle.  ¨ Operate heavy machinery.  ¨ Cook.  ¨ Use power tools.  ¨ Climb ladders.  ¨ Work from a high place.  · Do not make important decisions or sign legal documents until you are improved.  · If you vomit, drink water, juice, or soup when you can drink without vomiting. Make sure you have little or no nausea before eating solid foods.  · Only take over-the-counter or prescription medicines for pain, discomfort, or fever as directed by your health care provider.  · Make sure you and your family fully understand everything about the medicines given to you, including what side effects may occur.  · You should not drink alcohol, take sleeping pills, or take medicines that cause drowsiness for at least 24 hours.  · If you smoke, do not smoke without supervision.  · If you are feeling better, you may resume normal activities 24 hours after you were sedated.  · Keep all appointments with your health care provider.  SEEK MEDICAL CARE IF:  · Your skin is pale or bluish in color.  · You continue to feel nauseous or vomit.  · Your pain is getting worse and is not helped by medicine.  · You have bleeding or swelling.  · You are still  sleepy or feeling clumsy after 24 hours.  SEEK IMMEDIATE MEDICAL CARE IF:  · You develop a rash.  · You have difficulty breathing.  · You develop any type of allergic problem.  · You have a fever.  MAKE SURE YOU:  · Understand these instructions.  · Will watch your condition.  · Will get help right away if you are not doing well or get worse.     This information is not intended to replace advice given to you by your health care provider. Make sure you discuss any questions you have with your health care provider.     Document Released: 10/08/2014 Document Revised: 01/08/2016 Document Reviewed: 10/08/2014  [employer] Interactive Patient Education ©2016 [employer]         CALL YOUR PHYSICIAN IF YOU EXPERIENCE  INCREASED PAIN NOT HELPED BY YOUR PAIN MEDICATION.    IF YOU HAVE QUESTIONS OR CONCERNS AFTER YOU ARE DISCHARGED CALL THE NURSE AT THE [facility] LINE [phone].              Fall Prevention in the Home      Falls can cause injuries. They can happen to people of all ages. There are many things you can do to make your home safe and to help prevent falls.    WHAT CAN I DO ON THE OUTSIDE OF MY HOME?  · Regularly fix the edges of walkways and driveways and fix any cracks.  · Remove anything that might make you trip as you walk through a door, such as a raised step or threshold.  · Trim any bushes or trees on the path to your home.  · Use bright outdoor lighting.  · Clear any walking paths of anything that might make someone trip, such as rocks or tools.  · Regularly check to see if handrails are loose or broken. Make sure that both sides of any steps have handrails.  · Any raised decks and porches should have guardrails on the edges.  · Have any leaves, snow, or ice cleared regularly.  · Use sand or salt on walking paths during winter.  · Clean up any spills in your garage right away. This includes oil or grease spills.  WHAT CAN I DO IN THE BATHROOM?    · Use night lights.  · Install grab bars by the  toilet and in the tub and shower. Do not use towel bars as grab bars.  · Use non-skid mats or decals in the tub or shower.  · If you need to sit down in the shower, use a plastic, non-slip stool.  · Keep the floor dry. Clean up any water that spills on the floor as soon as it happens.  · Remove soap buildup in the tub or shower regularly.  · Attach bath mats securely with double-sided non-slip rug tape.  · Do not have throw rugs and other things on the floor that can make you trip.  WHAT CAN I DO IN THE BEDROOM?  · Use night lights.  · Make sure that you have a light by your bed that is easy to reach.  · Do not use any sheets or blankets that are too big for your bed. They should not hang down onto the floor.  · Have a firm chair that has side arms. You can use this for support while you get dressed.  · Do not have throw rugs and other things on the floor that can make you trip.  WHAT CAN I DO IN THE KITCHEN?  · Clean up any spills right away.  · Avoid walking on wet floors.  · Keep items that you use a lot in easy-to-reach places.  · If you need to reach something above you, use a strong step stool that has a grab bar.  · Keep electrical cords out of the way.  · Do not use floor polish or wax that makes floors slippery. If you must use wax, use non-skid floor wax.  · Do not have throw rugs and other things on the floor that can make you trip.  WHAT CAN I DO WITH MY STAIRS?  · Do not leave any items on the stairs.  · Make sure that there are handrails on both sides of the stairs and use them. Fix handrails that are broken or loose. Make sure that handrails are as long as the stairways.  · Check any carpeting to make sure that it is firmly attached to the stairs. Fix any carpet that is loose or worn.  · Avoid having throw rugs at the top or bottom of the stairs. If you do have throw rugs, attach them to the floor with carpet tape.  · Make sure that you have a light switch at the top of the stairs and the bottom of  the stairs. If you do not have them, ask someone to add them for you.  WHAT ELSE CAN I DO TO HELP PREVENT FALLS?  · Wear shoes that:  ¨ Do not have high heels.  ¨ Have rubber bottoms.  ¨ Are comfortable and fit you well.  ¨ Are closed at the toe. Do not wear sandals.  · If you use a stepladder:  ¨ Make sure that it is fully opened. Do not climb a closed stepladder.  ¨ Make sure that both sides of the stepladder are locked into place.  ¨ Ask someone to hold it for you, if possible.  · Clearly josue and make sure that you can see:  ¨ Any grab bars or handrails.  ¨ First and last steps.  ¨ Where the edge of each step is.  · Use tools that help you move around (mobility aids) if they are needed. These include:  ¨ Canes.  ¨ Walkers.  ¨ Scooters.  ¨ Crutches.  · Turn on the lights when you go into a dark area. Replace any light bulbs as soon as they burn out.  · Set up your furniture so you have a clear path. Avoid moving your furniture around.  · If any of your floors are uneven, fix them.  · If there are any pets around you, be aware of where they are.  · Review your medicines with your doctor. Some medicines can make you feel dizzy. This can increase your chance of falling.  Ask your doctor what other things that you can do to help prevent falls.     This information is not intended to replace advice given to you by your health care provider. Make sure you discuss any questions you have with your health care provider.     Document Released: 10/14/2010 Document Revised: 05/03/2016 Document Reviewed: 01/22/2016  Elsevier Interactive Patient Education ©2016 [employer]     PATIENT/FAMILY/RESPONSIBLE PARTY VERBALIZES UNDERSTANDING OF ABOVE EDUCATION

## 2017-02-23 NOTE — H&P (VIEW-ONLY)
Name:  Sly Marin  YOB: 1935  Location: [facility]  Location Address: [address] [place] [address], [facility], [address] [place] [address]  Location Phone: [phone]    Chief Complaint  Chief Complaint   Patient presents with   • Skin Lesion     BCC left cheek        History of Present Illness  The patient  is a 81 y.o. male who is referred by Kayla Barrera MD for preoperative evaluation. He complains of a lesion of the left cheek present for several months. The lesion has been biopsied and pathology demonstrated a BCCa. The patient denies bleeding, scaling and rapid enlargement.          Past Medical History   Diagnosis Date   • Allergic rhinitis    • Anosmia    • Arthritis    • Asthma    • Candidiasis of mouth    • Coronary artery disease    • Deviated nasal septum    • Gastroesophageal reflux    • Geographic tongue    • Hypercholesterolemia    • Hypertension    • Hypertrophy of nasal turbinates    • Laryngopharyngeal reflux    • Smell disorder        Past Surgical History   Procedure Laterality Date   • Coronary angioplasty with stent placement     • Coronary artery bypass graft     • Other surgical history       Elbow         Current Outpatient Prescriptions:   •  albuterol (PROVENTIL HFA;VENTOLIN HFA) 108 (90 BASE) MCG/ACT inhaler, Every 4 (Four) Hours., Disp: , Rfl:   •  albuterol (PROVENTIL) (2.5 MG/3ML) 0.083% nebulizer solution, INHALE 1 AMPUL BY INHALATION ROUTE AS NEEDED 4 TIMES PER DAY PRN, Disp: , Rfl: 12  •  aspirin 81 MG tablet, Take 81 mg by mouth daily.  , Disp: , Rfl:   •  bumetanide (BUMEX) 1 MG tablet, Take 1 tablet by mouth daily, Disp: , Rfl:   •  carvedilol (COREG) 12.5 MG tablet, Take 1 tablet by mouth 2 times daily (with meals), Disp: , Rfl:   •  cetirizine (zyrTEC) 10 MG tablet, TAKE 1 TABLET (10 MG) BY ORAL ROUTE ONCE DAILY AS NEEDED, Disp: , Rfl: 2  •  clopidogrel (PLAVIX) 75 MG tablet, TAKE 1 TABLET BY MOUTH DAILY, Disp: , Rfl:   •  meloxicam (MOBIC) 15 MG  tablet, Take 15 mg by mouth daily., Disp: , Rfl:   •  montelukast (SINGULAIR) 10 MG tablet, Take 10 mg by mouth nightly.  , Disp: , Rfl:   •  Multiple Vitamins-Minerals (CENTRUM SILVER) tablet, Take 1 tablet by mouth daily., Disp: , Rfl:   •  nitroglycerin (NITROSTAT) 0.4 MG SL tablet, Place 1 tablet under the tongue every 5 minutes as needed for Chest pain., Disp: , Rfl:   •  omeprazole (PRILOSEC) 20 MG capsule, Take 20 mg by mouth daily., Disp: , Rfl:   •  potassium chloride (KLOR-CON) 20 MEQ CR tablet, Take 1 tablet by mouth daily, Disp: , Rfl:   •  ramipril (ALTACE) 5 MG capsule, Take 1 capsule by mouth daily, Disp: , Rfl:   •  simvastatin (ZOCOR) 40 MG tablet, TAKE 1 TABLET BY MOUTH EVERY NIGHT, Disp: , Rfl:   •  theophylline (THEODUR) 300 MG 12 hr tablet, Every 8 (Eight) Hours., Disp: , Rfl:     Adhesive tape    Family History   Problem Relation Age of Onset   • Heart disease Mother    • Cancer Father        Social History     Social History   • Marital status:      Spouse name: N/A   • Number of children: N/A   • Years of education: N/A     Occupational History   • Not on file.     Social History Main Topics   • Smoking status: Former Smoker   • Smokeless tobacco: Not on file   • Alcohol use Yes      Comment: occasionally   • Drug use: Defer   • Sexual activity: Not on file     Other Topics Concern   • Not on file     Social History Narrative       Review of Systems   Constitutional: Negative.    HENT: Negative.    Eyes: Negative.    Respiratory: Negative.    Cardiovascular: Negative.    Gastrointestinal: Negative.    Endocrine: Negative.    Genitourinary: Negative.    Musculoskeletal: Negative.    Skin:        Left cheek lesion   Allergic/Immunologic: Negative.    Neurological: Negative.    Hematological: Negative.    Psychiatric/Behavioral: Negative.        Vitals:    02/02/17 1055   BP: 130/68   Pulse: 80   Resp: 19   Temp: 97.8 °F (36.6 °C)       Objective     Physical Exam     CONSTITUTIONAL:  "Morbidly obese, well-developed, alert, oriented, in no acute distress     COMMUNICATION AND VOICE: able to communicate normally, normal voice quality    HEAD: normocephalic, no lesions, atraumatic, no tenderness, no masses     FACE: appearance normal, 1.2 cm RPP the left cheek, no tenderness, no deformities, facial motion symmetric    EYES: ocular motility normal, eyelids normal, orbits normal, no proptosis, conjunctiva normal , pupils equal, round     EARS:  Hearing: hearing to conversational voice intact bilaterally   External Ears: normal bilaterally, no lesions    NOSE:  External Nose: external nasal structure normal, no tenderness on palpation, no nasal discharge, no lesions, no evidence of trauma, nostrils patent     ORAL:  Lips: upper and lower lips without lesion     NECK:  Inspection and Palpation: neck appearance normal, no masses or tenderness    CHEST/RESPIRATORY: normal respiratory effort     CARDIOVASCULAR: no cyanosis or edema     NEUROLOGICAL/PSYCHIATRIC: oriented to time, place and person, mood normal, affect appropriate, CN II-XII intact grossly    Assessment/Plan   Sly was seen today for skin lesion.    Diagnoses and all orders for this visit:    Basal cell carcinoma  Comments:  Left cheek      * Surgery not found *  No orders of the defined types were placed in this encounter.    No Follow-up on file.       Patient Instructions   The risks, benefits and options of the procedure were explained to the patient. The possiblity of a persistent cosmetic defect as well as a \"flap\" or a graft to close the defect was discussed. The patient was instructed to stop all aspirin, NSAIDs and VIT E etc.  If appropriate, clearance with primary MD or specialist will be obtained preoperatively.  The patient was scheduled for a LOCAL/MAC Anesthesia with a frozen section for margin control.    For instructions on the proper use, care and disposal of controled substances, ask you doctor or refer to the [place] Board of " Medicine website: http://kbml.[place].gov/hb1/Pages/Considerations-For-Patient-Education.aspx

## 2017-02-24 LAB
CYTO UR: NORMAL
LAB AP CASE REPORT: NORMAL
LAB AP CLINICAL INFORMATION: NORMAL
Lab: NORMAL
Lab: NORMAL
PATH REPORT.FINAL DX SPEC: NORMAL
PATH REPORT.GROSS SPEC: NORMAL

## 2017-03-07 ENCOUNTER — OFFICE VISIT (OUTPATIENT)
Dept: OTOLARYNGOLOGY | Facility: CLINIC | Age: 82
End: 2017-03-07

## 2017-03-07 ENCOUNTER — OFFICE VISIT (OUTPATIENT)
Dept: CARDIOLOGY | Age: 82
End: 2017-03-07
Payer: MEDICARE

## 2017-03-07 VITALS
SYSTOLIC BLOOD PRESSURE: 118 MMHG | DIASTOLIC BLOOD PRESSURE: 66 MMHG | BODY MASS INDEX: 34.84 KG/M2 | HEIGHT: 67 IN | WEIGHT: 222 LBS | HEART RATE: 62 BPM

## 2017-03-07 DIAGNOSIS — I25.10 CORONARY ARTERY DISEASE INVOLVING NATIVE CORONARY ARTERY OF NATIVE HEART WITHOUT ANGINA PECTORIS: ICD-10-CM

## 2017-03-07 DIAGNOSIS — C44.91 BASAL CELL CARCINOMA: Primary | ICD-10-CM

## 2017-03-07 DIAGNOSIS — E78.2 MIXED HYPERLIPIDEMIA: ICD-10-CM

## 2017-03-07 DIAGNOSIS — I10 ESSENTIAL HYPERTENSION: Primary | ICD-10-CM

## 2017-03-07 PROCEDURE — 99024 POSTOP FOLLOW-UP VISIT: CPT | Performed by: OTOLARYNGOLOGY

## 2017-03-07 PROCEDURE — 93000 ELECTROCARDIOGRAM COMPLETE: CPT | Performed by: CLINICAL NURSE SPECIALIST

## 2017-03-07 PROCEDURE — 99213 OFFICE O/P EST LOW 20 MIN: CPT | Performed by: CLINICAL NURSE SPECIALIST

## 2017-03-07 RX ORDER — CETIRIZINE HYDROCHLORIDE 10 MG/1
10 TABLET ORAL DAILY
COMMUNITY

## 2017-03-07 NOTE — PROGRESS NOTES
Procedure   Suture Removal  Date/Time: 3/7/2017 10:23 AM  Performed by: PARADISE MYERS  Authorized by: RUDDY COLEMAN   Consent: Verbal consent obtained.  Consent given by: patient  Patient identity confirmed: verbally with patient  Body area: head/neck  Location details: left cheek  Comments: Patient presents for suture removal. The incision is well-approximated with no redness or edema noted. Patient does have a fluid collection of the left preauricular area. Thomas Merino PA-C has evaluated this area. Patient has been instructed on wound care and notified of pathology results.

## 2017-04-18 DIAGNOSIS — I25.10 CORONARY ARTERY DISEASE INVOLVING NATIVE CORONARY ARTERY OF NATIVE HEART WITHOUT ANGINA PECTORIS: Primary | ICD-10-CM

## 2017-04-18 RX ORDER — NITROGLYCERIN 0.4 MG/1
TABLET SUBLINGUAL
Qty: 25 TABLET | Refills: 1 | Status: SHIPPED | OUTPATIENT
Start: 2017-04-18 | End: 2018-09-27 | Stop reason: SDUPTHER

## 2017-04-24 ENCOUNTER — TELEPHONE (OUTPATIENT)
Dept: CARDIOLOGY | Age: 82
End: 2017-04-24

## 2017-06-08 ENCOUNTER — OFFICE VISIT (OUTPATIENT)
Dept: OTOLARYNGOLOGY | Facility: CLINIC | Age: 82
End: 2017-06-08

## 2017-06-08 VITALS — HEIGHT: 67 IN | WEIGHT: 216 LBS | TEMPERATURE: 97.2 F | RESPIRATION RATE: 19 BRPM | BODY MASS INDEX: 33.9 KG/M2

## 2017-06-08 DIAGNOSIS — C44.91 BASAL CELL CARCINOMA: Primary | ICD-10-CM

## 2017-06-08 PROCEDURE — 99213 OFFICE O/P EST LOW 20 MIN: CPT | Performed by: OTOLARYNGOLOGY

## 2017-06-08 RX ORDER — PANTOPRAZOLE SODIUM 40 MG/1
TABLET, DELAYED RELEASE ORAL
Refills: 2 | COMMUNITY
Start: 2017-05-15

## 2017-06-08 RX ORDER — LEVOTHYROXINE SODIUM 0.03 MG/1
TABLET ORAL
Refills: 2 | COMMUNITY
Start: 2017-05-23

## 2017-06-08 NOTE — PROGRESS NOTES
Patient Care Team:  Rene Leonardo MD as PCP - General (General Practice)  Zhen Barrera MD as Consulting Physician (Otolaryngology)    Chief Complaint   Patient presents with   • Follow-up     BCC left cheek        Subjective     Sly Marin is a 82 y.o. male who presents for evaluation.  HPI Comments: Patient presents for follow-up evaluation of left cheek s/p excision of BCC. The patient is doing well, but complains of nonpainful swelling and numbness. No other concerns at this time. Pathology showed clear margins.       Review of Systems  Review of Systems   Constitutional: Negative for activity change, appetite change, chills, diaphoresis, fatigue, fever and unexpected weight change.   HENT: Positive for facial swelling (left cheek). Negative for congestion, ear discharge, ear pain, hearing loss, mouth sores, nosebleeds, postnasal drip, rhinorrhea, sinus pressure, sneezing, sore throat, tinnitus, trouble swallowing and voice change.    Eyes: Negative for pain, discharge, redness, itching and visual disturbance.   Respiratory: Negative for apnea, cough, choking, chest tightness, shortness of breath, wheezing and stridor.    Gastrointestinal: Negative for nausea and vomiting.   Endocrine: Negative for cold intolerance and heat intolerance.   Musculoskeletal: Negative for arthralgias, back pain, gait problem, neck pain and neck stiffness.   Skin: Negative for rash.   Allergic/Immunologic: Negative for environmental allergies and food allergies.   Neurological: Negative for dizziness, tremors, seizures, syncope, facial asymmetry, speech difficulty, weakness, light-headedness, numbness and headaches.   Hematological: Negative for adenopathy. Does not bruise/bleed easily.   Psychiatric/Behavioral: Negative for behavioral problems, sleep disturbance and suicidal ideas. The patient is not nervous/anxious and is not hyperactive.        History  Past Medical History:   Diagnosis Date   • Allergic  rhinitis    • Anosmia    • Arthritis    • Asthma    • Basal cell carcinoma of cheek     left   • Candidiasis of mouth    • Coronary artery disease    • Deviated nasal septum    • Gastroesophageal reflux    • Geographic tongue    • Hypercholesterolemia    • Hypertension    • Hypertrophy of nasal turbinates    • Laryngopharyngeal reflux    • Smell disorder      Past Surgical History:   Procedure Laterality Date   • CORONARY ANGIOPLASTY WITH STENT PLACEMENT     • CORONARY ARTERY BYPASS GRAFT     • EYE SURGERY      SHANNAN CATARACT REMOVAL   • FLAP HEAD/NECK Left 2/23/2017    Procedure: FLAP HEAD NECK;  Surgeon: Zhen Barrera MD;  Location:  [facility];  Service:    • HEAD/NECK LESION/CYST EXCISION Left 2/23/2017    Procedure: EXCISION LESION of the left cheek with frozen section with  flap;  Surgeon: Zhen Barrera MD;  Location:  [facility];  Service:    • NERVE SURGERY Left     ELBOW   • OTHER SURGICAL HISTORY      Elbow   • SKIN BIOPSY       Family History   Problem Relation Age of Onset   • Heart disease Mother    • Cancer Father      Social History   Substance Use Topics   • Smoking status: Former Smoker   • Smokeless tobacco: None   • Alcohol use Defer      Comment: occasionally       Current Outpatient Prescriptions:   •  albuterol (PROVENTIL HFA;VENTOLIN HFA) 108 (90 BASE) MCG/ACT inhaler, Every 4 (Four) Hours., Disp: , Rfl:   •  albuterol (PROVENTIL) (2.5 MG/3ML) 0.083% nebulizer solution, INHALE 1 AMPUL BY INHALATION ROUTE AS NEEDED 4 TIMES PER DAY PRN, Disp: , Rfl: 12  •  aspirin 81 MG tablet, Take 81 mg by mouth daily.  , Disp: , Rfl:   •  bumetanide (BUMEX) 1 MG tablet, Take 1 tablet by mouth daily, Disp: , Rfl:   •  carvedilol (COREG) 12.5 MG tablet, Take 1 tablet by mouth 2 times daily (with meals), Disp: , Rfl:   •  cetirizine (zyrTEC) 10 MG tablet, TAKE 1 TABLET (10 MG) BY ORAL ROUTE ONCE DAILY AS NEEDED, Disp: , Rfl: 2  •  clopidogrel (PLAVIX) 75 MG tablet, TAKE 1 TABLET BY MOUTH DAILY, Disp: ,  Rfl:   •  levothyroxine (SYNTHROID, LEVOTHROID) 25 MCG tablet, TAKE 1 TABLET EVERY DAY, Disp: , Rfl: 2  •  meloxicam (MOBIC) 15 MG tablet, Take 15 mg by mouth daily., Disp: , Rfl:   •  montelukast (SINGULAIR) 10 MG tablet, Take 10 mg by mouth nightly.  , Disp: , Rfl:   •  Multiple Vitamins-Minerals (CENTRUM SILVER) tablet, Take 1 tablet by mouth daily., Disp: , Rfl:   •  nitroglycerin (NITROSTAT) 0.4 MG SL tablet, Place 1 tablet under the tongue every 5 minutes as needed for Chest pain., Disp: , Rfl:   •  pantoprazole (PROTONIX) 40 MG EC tablet, TAKE 1 TABLET (40 MG) BY ORAL ROUTE ONCE DAILY, Disp: , Rfl: 2  •  potassium chloride (KLOR-CON) 20 MEQ CR tablet, Take 1 tablet by mouth daily, Disp: , Rfl:   •  ramipril (ALTACE) 5 MG capsule, Take 1 capsule by mouth daily, Disp: , Rfl:   •  simvastatin (ZOCOR) 40 MG tablet, TAKE 1 TABLET BY MOUTH EVERY NIGHT, Disp: , Rfl:   •  theophylline (THEODUR) 300 MG 12 hr tablet, Every 8 (Eight) Hours., Disp: , Rfl:   Allergies:  Adhesive tape    Objective     Vital Signs  Temp:  [97.2 °F (36.2 °C)] 97.2 °F (36.2 °C)  Resp:  [19] 19    Physical Exam:  Physical Exam   Constitutional: He is oriented to person, place, and time. He appears well-developed and well-nourished. No distress.   HENT:   Head: Normocephalic and atraumatic.       Right Ear: External ear normal.   Left Ear: External ear normal.   Nose: Nose normal.   Mouth/Throat: Oropharynx is clear and moist.   Eyes: Conjunctivae and EOM are normal. Pupils are equal, round, and reactive to light. Right eye exhibits no discharge. Left eye exhibits no discharge. No scleral icterus.   Pulmonary/Chest: Effort normal.   Neurological: He is alert and oriented to person, place, and time.   Skin: Skin is warm and dry. He is not diaphoretic.   Psychiatric: He has a normal mood and affect. His behavior is normal. Judgment and thought content normal.   Vitals reviewed.      Results Review:   None    Assessment/Plan     Problems Addressed  this Visit        Musculoskeletal and Integument    Basal cell carcinoma - Primary          My findings and recommendations were discussed and questions were answered. Will continue to monitor, patient has follow-up with Dermatology this month.    Return in about 6 months (around 12/8/2017) for Recheck left cheek.    DOMO Velarde  06/08/17  10:23 AM

## 2017-06-27 DIAGNOSIS — E87.79 OTHER FLUID OVERLOAD: ICD-10-CM

## 2017-06-27 RX ORDER — BUMETANIDE 1 MG/1
TABLET ORAL
Qty: 90 TABLET | Refills: 3 | Status: SHIPPED | OUTPATIENT
Start: 2017-06-27 | End: 2017-12-12 | Stop reason: SDUPTHER

## 2017-09-07 ENCOUNTER — OFFICE VISIT (OUTPATIENT)
Dept: CARDIOLOGY | Age: 82
End: 2017-09-07
Payer: MEDICARE

## 2017-09-07 VITALS
HEART RATE: 70 BPM | BODY MASS INDEX: 34.06 KG/M2 | HEIGHT: 67 IN | SYSTOLIC BLOOD PRESSURE: 120 MMHG | WEIGHT: 217 LBS | DIASTOLIC BLOOD PRESSURE: 64 MMHG

## 2017-09-07 DIAGNOSIS — I25.10 CORONARY ARTERY DISEASE INVOLVING NATIVE CORONARY ARTERY OF NATIVE HEART WITHOUT ANGINA PECTORIS: Primary | ICD-10-CM

## 2017-09-07 DIAGNOSIS — E78.2 MIXED HYPERLIPIDEMIA: ICD-10-CM

## 2017-09-07 DIAGNOSIS — I10 ESSENTIAL HYPERTENSION: ICD-10-CM

## 2017-09-07 PROCEDURE — 99213 OFFICE O/P EST LOW 20 MIN: CPT | Performed by: CLINICAL NURSE SPECIALIST

## 2017-09-07 RX ORDER — LEVOTHYROXINE SODIUM 0.03 MG/1
25 TABLET ORAL DAILY
COMMUNITY

## 2017-12-02 DIAGNOSIS — I10 ESSENTIAL HYPERTENSION: ICD-10-CM

## 2017-12-04 RX ORDER — POTASSIUM CHLORIDE 1500 MG/1
20 TABLET, EXTENDED RELEASE ORAL DAILY
Qty: 90 TABLET | Refills: 3 | Status: SHIPPED | OUTPATIENT
Start: 2017-12-04 | End: 2018-11-25 | Stop reason: SDUPTHER

## 2017-12-12 ENCOUNTER — OFFICE VISIT (OUTPATIENT)
Dept: CARDIOLOGY | Age: 82
End: 2017-12-12
Payer: MEDICARE

## 2017-12-12 ENCOUNTER — OFFICE VISIT (OUTPATIENT)
Dept: OTOLARYNGOLOGY | Facility: CLINIC | Age: 82
End: 2017-12-12

## 2017-12-12 VITALS
SYSTOLIC BLOOD PRESSURE: 130 MMHG | HEART RATE: 60 BPM | BODY MASS INDEX: 34.53 KG/M2 | WEIGHT: 220 LBS | HEIGHT: 67 IN | DIASTOLIC BLOOD PRESSURE: 70 MMHG

## 2017-12-12 VITALS
HEART RATE: 77 BPM | RESPIRATION RATE: 20 BRPM | BODY MASS INDEX: 33.43 KG/M2 | SYSTOLIC BLOOD PRESSURE: 131 MMHG | WEIGHT: 213 LBS | TEMPERATURE: 98 F | HEIGHT: 67 IN | DIASTOLIC BLOOD PRESSURE: 70 MMHG

## 2017-12-12 DIAGNOSIS — I25.10 CORONARY ARTERY DISEASE INVOLVING NATIVE CORONARY ARTERY OF NATIVE HEART WITHOUT ANGINA PECTORIS: Primary | ICD-10-CM

## 2017-12-12 DIAGNOSIS — R42 DIZZINESS: ICD-10-CM

## 2017-12-12 DIAGNOSIS — C44.319 BASAL CELL CARCINOMA OF LEFT CHEEK: Primary | ICD-10-CM

## 2017-12-12 DIAGNOSIS — I10 ESSENTIAL HYPERTENSION: ICD-10-CM

## 2017-12-12 PROCEDURE — 1123F ACP DISCUSS/DSCN MKR DOCD: CPT | Performed by: CLINICAL NURSE SPECIALIST

## 2017-12-12 PROCEDURE — 99213 OFFICE O/P EST LOW 20 MIN: CPT | Performed by: NURSE PRACTITIONER

## 2017-12-12 PROCEDURE — G8484 FLU IMMUNIZE NO ADMIN: HCPCS | Performed by: CLINICAL NURSE SPECIALIST

## 2017-12-12 PROCEDURE — G8428 CUR MEDS NOT DOCUMENT: HCPCS | Performed by: CLINICAL NURSE SPECIALIST

## 2017-12-12 PROCEDURE — 99214 OFFICE O/P EST MOD 30 MIN: CPT | Performed by: CLINICAL NURSE SPECIALIST

## 2017-12-12 PROCEDURE — 1036F TOBACCO NON-USER: CPT | Performed by: CLINICAL NURSE SPECIALIST

## 2017-12-12 PROCEDURE — G8598 ASA/ANTIPLAT THER USED: HCPCS | Performed by: CLINICAL NURSE SPECIALIST

## 2017-12-12 PROCEDURE — 4040F PNEUMOC VAC/ADMIN/RCVD: CPT | Performed by: CLINICAL NURSE SPECIALIST

## 2017-12-12 PROCEDURE — G8417 CALC BMI ABV UP PARAM F/U: HCPCS | Performed by: CLINICAL NURSE SPECIALIST

## 2017-12-12 RX ORDER — BUMETANIDE 1 MG/1
0.5 TABLET ORAL DAILY
Qty: 90 TABLET | Refills: 3
Start: 2017-12-12 | End: 2018-03-21 | Stop reason: DRUGHIGH

## 2017-12-12 RX ORDER — LEVOTHYROXINE SODIUM 0.03 MG/1
TABLET ORAL
COMMUNITY

## 2017-12-12 RX ORDER — CARVEDILOL 12.5 MG/1
6.25 TABLET ORAL 2 TIMES DAILY WITH MEALS
Qty: 60 TABLET | Refills: 5
Start: 2017-12-12 | End: 2018-03-21 | Stop reason: DRUGHIGH

## 2017-12-12 RX ORDER — OMEPRAZOLE 20 MG/1
CAPSULE, DELAYED RELEASE ORAL
Refills: 2 | COMMUNITY
Start: 2017-11-29 | End: 2017-12-12 | Stop reason: SDUPTHER

## 2017-12-12 ASSESSMENT — ENCOUNTER SYMPTOMS
NAUSEA: 0
VOMITING: 0
ORTHOPNEA: 0
BLURRED VISION: 1
COUGH: 0
HEARTBURN: 0
ABDOMINAL PAIN: 0
SHORTNESS OF BREATH: 1

## 2017-12-12 NOTE — PATIENT INSTRUCTIONS
Followup With Dr. Jun Gold as scheduled  Decrease Bumex (bumetanide) to 1/2 tab daily  Decrease carvedilol 1/2 tab twice a day  Increase your fluid intake  If dizziness is not improving in a few weeks- call office back    Call with any questions or concerns  Follow up with Duc Dixon MD for non cardiac problems  Report any new problems  Cardiovascular Fitness-Exercise as tolerated. Strive for 15 minutes of exercise most days of the week. Cardiac / Healthy Diet  Continue current medications as directed  Continue plan of treatment  It is always recommended that you bring your medications bottles with you to each visit - this is for your safety! Patient Education        Dizziness: Care Instructions  Your Care Instructions  Dizziness is the feeling of unsteadiness or fuzziness in your head. It is different than having vertigo, which is a feeling that the room is spinning or that you are moving or falling. It is also different from lightheadedness, which is the feeling that you are about to faint. It can be hard to know what causes dizziness. Some people feel dizzy when they have migraine headaches. Sometimes bouts of flu can make you feel dizzy. Some medical conditions, such as heart problems or high blood pressure, can make you feel dizzy. Many medicines can cause dizziness, including medicines for high blood pressure, pain, or anxiety. If a medicine causes your symptoms, your doctor may recommend that you stop or change the medicine. If it is a problem with your heart, you may need medicine to help your heart work better. If there is no clear reason for your symptoms, your doctor may suggest watching and waiting for a while to see if the dizziness goes away on its own. Follow-up care is a key part of your treatment and safety. Be sure to make and go to all appointments, and call your doctor if you are having problems.  It's also a good idea to know your test results and keep a list of the [email or web address] and sign in to your [email or web address] account. Enter L951 in the [employer] box to learn more about \"Dizziness: Care Instructions. \"     If you do not have an account, please click on the \"Sign Up Now\" link. Current as of: March 20, 2017  Content Version: 11.4  © 4683-6848 [employer]wise, [employer]. Care instructions adapted under license by [facility] ([facility]). If you have questions about a medical condition or this instruction, always ask your healthcare professional. [address] any warranty or liability for your use of this information.

## 2017-12-12 NOTE — PROGRESS NOTES
Chief Complaint   Patient presents with   • Follow-up     BCC OF CHEEK        Skin Cancer Follow-up    Sly Marin presents for a cancer surveillance and skin check following excision of a basal cell carcinoma of the left cheek with transposition flap on 02/23/2017 by Dr. Barrera.    Subjective: Since surgery, he is doing fairly well, but the following is reported: continued swelling of the left cheek superior to the incision. Symptoms denied postoperatively include pain, fever, chills, bleeding and drainage. The patient states the pain has ceased since surgery.     Patient presents for follow-up general head and neck skin examination.  Patient has a history of basal cell carcinoma.  He has not noted recurrence.  The patient has not noted new worrisome lesions.      Objective:   Pathology review: Pathology is available.. Pathology demonstrates a diagnosis of basal cell carcinoma with clear margins.         Past Medical History:   Diagnosis Date   • Allergic rhinitis    • Anosmia    • Arthritis    • Asthma    • Basal cell carcinoma of cheek     left   • Candidiasis of mouth    • Coronary artery disease    • Deviated nasal septum    • Gastroesophageal reflux    • Geographic tongue    • Hypercholesterolemia    • Hypertension    • Hypertrophy of nasal turbinates    • Laryngopharyngeal reflux    • Smell disorder      Past Surgical History:   Procedure Laterality Date   • CORONARY ANGIOPLASTY WITH STENT PLACEMENT     • CORONARY ARTERY BYPASS GRAFT     • EYE SURGERY      SHANNAN CATARACT REMOVAL   • FLAP HEAD/NECK Left 2/23/2017    Procedure: FLAP HEAD NECK;  Surgeon: Zhen Barrera MD;  Location:  [facility] OR;  Service:    • HEAD/NECK LESION/CYST EXCISION Left 2/23/2017    Procedure: EXCISION LESION of the left cheek with frozen section with  flap;  Surgeon: Zhen Barrera MD;  Location:  [facility] OR;  Service:    • NERVE SURGERY Left     ELBOW   • OTHER SURGICAL HISTORY      Elbow   • SKIN BIOPSY       Family History    Problem Relation Age of Onset   • Heart disease Mother    • Cancer Father      Social History   Substance Use Topics   • Smoking status: Former Smoker   • Smokeless tobacco: Never Used   • Alcohol use Defer      Comment: occasionally     Allergies:  Adhesive tape    Current Outpatient Prescriptions:   •  albuterol (PROVENTIL HFA;VENTOLIN HFA) 108 (90 BASE) MCG/ACT inhaler, Every 4 (Four) Hours., Disp: , Rfl:   •  albuterol (PROVENTIL) (2.5 MG/3ML) 0.083% nebulizer solution, INHALE 1 AMPUL BY INHALATION ROUTE AS NEEDED 4 TIMES PER DAY PRN, Disp: , Rfl: 12  •  aspirin 81 MG tablet, Take 81 mg by mouth daily.  , Disp: , Rfl:   •  bumetanide (BUMEX) 1 MG tablet, Take 1 tablet by mouth daily, Disp: , Rfl:   •  carvedilol (COREG) 12.5 MG tablet, Take 1 tablet by mouth 2 times daily (with meals), Disp: , Rfl:   •  cetirizine (zyrTEC) 10 MG tablet, TAKE 1 TABLET (10 MG) BY ORAL ROUTE ONCE DAILY AS NEEDED, Disp: , Rfl: 2  •  clopidogrel (PLAVIX) 75 MG tablet, TAKE 1 TABLET BY MOUTH DAILY, Disp: , Rfl:   •  levothyroxine (SYNTHROID, LEVOTHROID) 25 MCG tablet, TAKE 1 TABLET EVERY DAY, Disp: , Rfl: 2  •  levothyroxine (SYNTHROID, LEVOTHROID) 25 MCG tablet, Take  by mouth., Disp: , Rfl:   •  meloxicam (MOBIC) 15 MG tablet, Take 15 mg by mouth daily., Disp: , Rfl:   •  montelukast (SINGULAIR) 10 MG tablet, Take 10 mg by mouth nightly.  , Disp: , Rfl:   •  Multiple Vitamins-Minerals (CENTRUM SILVER) tablet, Take 1 tablet by mouth daily., Disp: , Rfl:   •  nitroglycerin (NITROSTAT) 0.4 MG SL tablet, Place 1 tablet under the tongue every 5 minutes as needed for Chest pain., Disp: , Rfl:   •  pantoprazole (PROTONIX) 40 MG EC tablet, TAKE 1 TABLET (40 MG) BY ORAL ROUTE ONCE DAILY, Disp: , Rfl: 2  •  potassium chloride (KLOR-CON) 20 MEQ CR tablet, Take 1 tablet by mouth daily, Disp: , Rfl:   •  ramipril (ALTACE) 5 MG capsule, Take 1 capsule by mouth daily, Disp: , Rfl:   •  simvastatin (ZOCOR) 40 MG tablet, TAKE 1 TABLET BY MOUTH  "EVERY NIGHT, Disp: , Rfl:   •  theophylline (THEODUR) 300 MG 12 hr tablet, Every 8 (Eight) Hours., Disp: , Rfl:     Review of Systems   Constitutional: Negative for activity change, appetite change, chills, fatigue, fever and unexpected weight change.   HENT: Negative for congestion, dental problem, facial swelling and nosebleeds.    Eyes: Negative for discharge and redness.   Skin: Negative for color change, pallor and rash.   Hematological: Negative for adenopathy. Does not bruise/bleed easily.          /70  Pulse 77  Temp 98 °F (36.7 °C)  Resp 20  Ht 170.2 cm (67\")  Wt 96.6 kg (213 lb)  BMI 33.36 kg/m2    Physical Exam  CONSTITUTIONAL: well nourished, well-developed, alert, oriented, in no acute distress   COMMUNICATION AND VOICE: able to communicate normally, normal voice quality  HEAD: normocephalic, no lesions, atraumatic, no tenderness, no masses   FACE: appearance normal, no lesions, no tenderness, no deformities, facial motion symmetric, left cheek flap well healed without evidence of recurrence, there is no appreciable swelling noted, the location of the flap has accentuated the left parotid gland giving the appearance of swelling  SALIVARY GLANDS: parotid glands with no tenderness, no swelling, no masses, submandibular glands with normal size, nontender  EYES: ocular motility normal, eyelids normal, orbits normal, no proptosis, conjunctiva normal , pupils equal, round   EARS:  Hearing: response to conversational voice normal bilaterally   External Ears: auricles without lesions  NOSE:  External Nose: structure normal, no tenderness on palpation, no nasal discharge, no lesions, no evidence of trauma, nostrils patent   ORAL:  Lips: upper and lower lips without lesion   NECK: neck appearance normal, no masses or tenderness  LYMPH NODES: no lymphadenopathy  CHEST/RESPIRATORY: respiratory effort normal, normal breath sounds   CARDIOVASCULAR: rate and rhythm normal, extremities without cyanosis or " edema    NEUROLOGIC/PSYCHIATRIC: oriented to time, place and person, mood normal, affect appropriate, CN II-XII intact grossly    Assessment:  Sly was seen today for follow-up.    Diagnoses and all orders for this visit:    Basal cell carcinoma of left cheek        Plan:    Dr. Barrera has also seen and examined this patient and agrees.     Protect the incisions from sunlight. Sunlight to the incisions will cause permanent pigmentation to the incision line and make the incision more noticeable. After the incision has reepithelialized, you may begin to use sunscreen with an SPF of 15 or greater    I discussed the use of  Vitamin E, Mederma, or a high-quality extra virgin olive oil to the incisions to optimize the end result. Apply topically twice daily, or as directed, to help optimize wound healing and decrease erythema.    Discussed the importance of routine skin checks with a dermatologist every 6-12 months.  He currently sees Dr. Kayla Barrera every 6 months.      Return if symptoms worsen or fail to improve, for Recheck.      MELQUIADES Hearn

## 2017-12-12 NOTE — PROGRESS NOTES
CARDIOVASCULAR STRESS TEST  04/24/08, [facility]    Stress Echo    CARPAL TUNNEL RELEASE  07/28/05, Gerardo    LEFT anterior subcutaneous ulnar nerve transpostion and release of Guyon's canal carpal tunnel release.  CORONARY ARTERY BYPASS GRAFT  07/2007    x 4    ELBOW SURGERY      Left     Family History   Problem Relation Age of Onset    Coronary Art Dis Mother     Hypertension Mother     Cancer Father     Cancer Sister      Social History   Substance Use Topics    Smoking status: Former Smoker     Quit date: 6/9/1995    Smokeless tobacco: Never Used    Alcohol use No      Current Outpatient Prescriptions   Medication Sig Dispense Refill    carvedilol (COREG) 12.5 MG tablet Take 0.5 tablets by mouth 2 times daily (with meals) 60 tablet 5    bumetanide (BUMEX) 1 MG tablet Take 0.5 tablets by mouth daily 90 tablet 3    KLOR-CON M20 20 MEQ extended release tablet TAKE 1 TABLET BY MOUTH DAILY 90 tablet 3    levothyroxine (SYNTHROID) 25 MCG tablet Take 25 mcg by mouth Daily      NITROSTAT 0.4 MG SL tablet PLACE 1 TABLET UNDER TONGUE AS NEEDED FOR CHEST PAIN 25 tablet 1    cetirizine (ZYRTEC) 10 MG tablet Take 10 mg by mouth daily      clopidogrel (PLAVIX) 75 MG tablet TAKE 1 TABLET BY MOUTH DAILY 30 tablet 5    simvastatin (ZOCOR) 40 MG tablet TAKE 1 TABLET BY MOUTH EVERY NIGHT 90 tablet 3    ramipril (ALTACE) 5 MG capsule Take 1 capsule by mouth daily 90 capsule 3    mometasone (NASONEX) 50 MCG/ACT nasal spray       theophylline CR, 12 hour, (THEODUR) 300 MG CR tablet Take 150 mg by mouth every 8 hours 1/2 tablet tid      albuterol (PROVENTIL HFA;VENTOLIN HFA) 108 (90 BASE) MCG/ACT inhaler Inhale 2 puffs into the lungs every 4 hours as needed for Wheezing      Multiple Vitamins-Minerals (CENTRUM SILVER) TABS Take 1 tablet by mouth daily.  meloxicam (MOBIC) 15 MG tablet Take 15 mg by mouth daily.  aspirin 81 MG tablet Take 81 mg by mouth daily.         omeprazole (PRILOSEC) 20 MG capsule Take 20 mg by mouth daily.  montelukast (SINGULAIR) 10 MG tablet Take 10 mg by mouth nightly. No current facility-administered medications for this visit. Allergies: Adhesive tape    Review of Systems  Review of Systems   Constitutional: Negative for chills, fever and malaise/fatigue. HENT: Negative for nosebleeds. Eyes: Positive for blurred vision (with position change). Respiratory: Positive for shortness of breath. Negative for cough. Cardiovascular: Negative for chest pain, palpitations, orthopnea, leg swelling and PND. Gastrointestinal: Negative for abdominal pain, heartburn, nausea and vomiting. Musculoskeletal: Negative for falls and myalgias. Skin: Negative for rash. Neurological: Positive for dizziness (with position change). Negative for sensory change, speech change and focal weakness. No syncope   Endo/Heme/Allergies: Does not bruise/bleed easily. Psychiatric/Behavioral: Negative for depression. The patient is not nervous/anxious. Objective  Vital Signs - /70   Pulse 60   Ht 5' 7\" (1.702 m)   Wt 220 lb (99.8 kg)   BMI 34.46 kg/m²   Physical Exam   Constitutional: He is oriented to person, place, and time. He appears well-developed and well-nourished. No distress. HENT:   Head: Normocephalic and atraumatic. Eyes: Pupils are equal, round, and reactive to light. Right eye exhibits no discharge. Left eye exhibits no discharge. Neck: No JVD present. No tracheal deviation present. Cardiovascular: Normal rate, regular rhythm, normal heart sounds and intact distal pulses. Exam reveals no gallop and no friction rub. No murmur heard. No carotid bruit   Pulmonary/Chest: Effort normal and breath sounds normal. No respiratory distress. He has no wheezes. He has no rales. Abdominal: Soft. There is no tenderness. Musculoskeletal: He exhibits no edema.    Normal gait and station   Neurological: He is alert and oriented to person, place, and time. No cranial nerve deficit. Skin: Skin is warm and dry. No rash noted. Psychiatric: He has a normal mood and affect. His behavior is normal. Judgment normal.   Nursing note and vitals reviewed. Assessment:    1. Coronary artery disease involving native coronary artery of native heart without angina pectoris     2. Dizziness  bumetanide (BUMEX) 1 MG tablet   3. Essential hypertension  carvedilol (COREG) 12.5 MG tablet     Patient is taking medications as prescribed    BP sitting 130/70, Standing 150/66    Blood pressure is not dropping when standing but in fact goes up. Dizziness is definitely related to position change. I'm going to go ahead and decrease his carvedilol to 6.25 mg twice a day and decrease his Bumex to 0.5 mg daily. I have encouraged him to increase his fluid intake. He will change position slowly. He will give this medication change time to work and if he is not improving in a few weeks, he will call the office back    Plan    Followup With Dr. Shanti Quach as scheduled  Decrease Bumex (bumetanide) to 1/2 tab daily  Decrease carvedilol 1/2 tab twice a day  Increase your fluid intake  If dizziness is not improving in a few weeks- call office back    Call with any questions or concerns  Follow up with Geovanna Campoverde MD for non cardiac problems  Report any new problems  Cardiovascular Fitness-Exercise as tolerated. Strive for 15 minutes of exercise most days of the week. Cardiac / Healthy Diet  Continue current medications as directed  Continue plan of treatment  It is always recommended that you bring your medications bottles with you to each visit - this is for your safety!        CHELSIE Norwood

## 2018-01-02 DIAGNOSIS — I25.10 CORONARY ARTERY DISEASE INVOLVING NATIVE HEART WITHOUT ANGINA PECTORIS, UNSPECIFIED VESSEL OR LESION TYPE: ICD-10-CM

## 2018-01-02 RX ORDER — CLOPIDOGREL BISULFATE 75 MG/1
TABLET ORAL
Qty: 30 TABLET | Refills: 5 | Status: SHIPPED | OUTPATIENT
Start: 2018-01-02 | End: 2018-07-01 | Stop reason: SDUPTHER

## 2018-01-06 DIAGNOSIS — I10 ESSENTIAL HYPERTENSION: ICD-10-CM

## 2018-01-08 RX ORDER — RAMIPRIL 5 MG/1
5 CAPSULE ORAL DAILY
Qty: 90 CAPSULE | Refills: 3 | Status: SHIPPED | OUTPATIENT
Start: 2018-01-08 | End: 2018-12-23 | Stop reason: SDUPTHER

## 2018-02-15 RX ORDER — MOMETASONE FUROATE 50 UG/1
SPRAY, METERED NASAL
Qty: 3 EACH | Refills: 2 | Status: SHIPPED | OUTPATIENT
Start: 2018-02-15 | End: 2018-05-16

## 2018-03-06 DIAGNOSIS — E78.2 MIXED HYPERLIPIDEMIA: ICD-10-CM

## 2018-03-06 RX ORDER — SIMVASTATIN 40 MG
TABLET ORAL
Qty: 90 TABLET | Refills: 3 | Status: SHIPPED | OUTPATIENT
Start: 2018-03-06 | End: 2019-03-08 | Stop reason: SDUPTHER

## 2018-03-20 ENCOUNTER — TELEPHONE (OUTPATIENT)
Dept: CARDIOLOGY | Age: 83
End: 2018-03-20

## 2018-03-21 ENCOUNTER — OFFICE VISIT (OUTPATIENT)
Dept: CARDIOLOGY | Age: 83
End: 2018-03-21
Payer: MEDICARE

## 2018-03-21 VITALS
WEIGHT: 229 LBS | HEIGHT: 67 IN | SYSTOLIC BLOOD PRESSURE: 138 MMHG | DIASTOLIC BLOOD PRESSURE: 62 MMHG | HEART RATE: 72 BPM | BODY MASS INDEX: 35.94 KG/M2

## 2018-03-21 DIAGNOSIS — R06.02 SHORTNESS OF BREATH: ICD-10-CM

## 2018-03-21 DIAGNOSIS — E78.2 MIXED HYPERLIPIDEMIA: ICD-10-CM

## 2018-03-21 DIAGNOSIS — I25.10 CORONARY ARTERY DISEASE INVOLVING NATIVE CORONARY ARTERY OF NATIVE HEART WITHOUT ANGINA PECTORIS: Primary | ICD-10-CM

## 2018-03-21 DIAGNOSIS — I10 ESSENTIAL HYPERTENSION: ICD-10-CM

## 2018-03-21 PROCEDURE — 99213 OFFICE O/P EST LOW 20 MIN: CPT | Performed by: CLINICAL NURSE SPECIALIST

## 2018-03-21 PROCEDURE — 1036F TOBACCO NON-USER: CPT | Performed by: CLINICAL NURSE SPECIALIST

## 2018-03-21 PROCEDURE — 93000 ELECTROCARDIOGRAM COMPLETE: CPT | Performed by: CLINICAL NURSE SPECIALIST

## 2018-03-21 PROCEDURE — G8427 DOCREV CUR MEDS BY ELIG CLIN: HCPCS | Performed by: CLINICAL NURSE SPECIALIST

## 2018-03-21 PROCEDURE — 1123F ACP DISCUSS/DSCN MKR DOCD: CPT | Performed by: CLINICAL NURSE SPECIALIST

## 2018-03-21 PROCEDURE — G8417 CALC BMI ABV UP PARAM F/U: HCPCS | Performed by: CLINICAL NURSE SPECIALIST

## 2018-03-21 PROCEDURE — G8484 FLU IMMUNIZE NO ADMIN: HCPCS | Performed by: CLINICAL NURSE SPECIALIST

## 2018-03-21 PROCEDURE — 4040F PNEUMOC VAC/ADMIN/RCVD: CPT | Performed by: CLINICAL NURSE SPECIALIST

## 2018-03-21 PROCEDURE — G8598 ASA/ANTIPLAT THER USED: HCPCS | Performed by: CLINICAL NURSE SPECIALIST

## 2018-03-21 RX ORDER — BUMETANIDE 1 MG/1
1 TABLET ORAL DAILY
COMMUNITY
End: 2018-08-08

## 2018-03-21 RX ORDER — CARVEDILOL 12.5 MG/1
12.5 TABLET ORAL 2 TIMES DAILY
COMMUNITY

## 2018-03-21 ASSESSMENT — ENCOUNTER SYMPTOMS
COUGH: 0
VOMITING: 0
ORTHOPNEA: 0
ABDOMINAL PAIN: 0
NAUSEA: 0
BLURRED VISION: 0
HEARTBURN: 0
SHORTNESS OF BREATH: 1

## 2018-03-21 NOTE — PATIENT INSTRUCTIONS
approximately 30 min. An echocardiogram uses sound waves to produce images of your heart. This commonly used test allows your doctor to see how your heart is beating and pumping blood. Your doctor can use the images from an echocardiogram to identify various abnormalities in the heart muscle and valves. This test has 2 parts:   Ø You will be asked to disrobe from the waist up and given a gown to wear. The technologist will then hook up an EKG monitor to you for the entire exam.   Ø You will then have an ultrasound of your heart (echocardiogram) to assess the heart muscle, heart valves and heart function. You may eat and take any medicines before the exam.     If you need to change your appointment, please call outpatient scheduling at [phone].

## 2018-03-21 NOTE — PROGRESS NOTES
angioplasty of in-stent stenosis of RCA    CARDIAC CATHETERIZATION  10/15/15  [facility]    stent to RCA. EF 55%    CARDIOVASCULAR STRESS TEST  04/24/08, [facility]    Stress Echo    CARPAL TUNNEL RELEASE  07/28/05, Gerardo    LEFT anterior subcutaneous ulnar nerve transpostion and release of Guyon's canal carpal tunnel release.  CORONARY ARTERY BYPASS GRAFT  07/2007    x 4    ELBOW SURGERY      Left     Family History   Problem Relation Age of Onset    Coronary Art Dis Mother     Hypertension Mother     Cancer Father     Cancer Sister      Social History   Substance Use Topics    Smoking status: Former Smoker     Types: Cigarettes    Smokeless tobacco: Never Used    Alcohol use No      Current Outpatient Prescriptions   Medication Sig Dispense Refill    bumetanide (BUMEX) 1 MG tablet Take 1 mg by mouth daily      carvedilol (COREG) 12.5 MG tablet Take 12.5 mg by mouth 2 times daily      simvastatin (ZOCOR) 40 MG tablet TAKE 1 TABLET BY MOUTH EVERY NIGHT 90 tablet 3    ramipril (ALTACE) 5 MG capsule TAKE 1 CAPSULE BY MOUTH DAILY 90 capsule 3    clopidogrel (PLAVIX) 75 MG tablet TAKE 1 TABLET BY MOUTH DAILY 30 tablet 5    KLOR-CON M20 20 MEQ extended release tablet TAKE 1 TABLET BY MOUTH DAILY 90 tablet 3    levothyroxine (SYNTHROID) 25 MCG tablet Take 25 mcg by mouth Daily      NITROSTAT 0.4 MG SL tablet PLACE 1 TABLET UNDER TONGUE AS NEEDED FOR CHEST PAIN 25 tablet 1    cetirizine (ZYRTEC) 10 MG tablet Take 10 mg by mouth daily      mometasone (NASONEX) 50 MCG/ACT nasal spray 2 sprays daily       theophylline CR, 12 hour, (THEODUR) 300 MG CR tablet Take 300 mg by mouth every 8 hours 1/2 tablet tid       albuterol (PROVENTIL HFA;VENTOLIN HFA) 108 (90 BASE) MCG/ACT inhaler Inhale 2 puffs into the lungs every 4 hours as needed for Wheezing      Multiple Vitamins-Minerals (CENTRUM SILVER) TABS Take 1 tablet by mouth daily.  meloxicam (MOBIC) 15 MG tablet Take 15 mg by mouth daily.       aspirin 81 MG tablet Take 81 mg by mouth daily.  omeprazole (PRILOSEC) 20 MG capsule Take 20 mg by mouth daily.  montelukast (SINGULAIR) 10 MG tablet Take 10 mg by mouth nightly. No current facility-administered medications for this visit. Allergies: Adhesive tape    Review of Systems  Review of Systems   Constitutional: Negative for chills, fever and malaise/fatigue. HENT: Negative for nosebleeds. Eyes: Negative for blurred vision. Respiratory: Positive for shortness of breath. Negative for cough. Cardiovascular: Negative for chest pain, palpitations, orthopnea, leg swelling and PND. Gastrointestinal: Negative for abdominal pain, heartburn, nausea and vomiting. Musculoskeletal: Negative for falls and myalgias. Skin: Negative for rash. Neurological: Negative for dizziness, sensory change, speech change and focal weakness. No syncope   Endo/Heme/Allergies: Does not bruise/bleed easily. Psychiatric/Behavioral: Negative for depression. The patient is not nervous/anxious. Objective  Vital Signs - /62   Pulse 72   Ht 5' 7\" (1.702 m)   Wt 229 lb (103.9 kg)   BMI 35.87 kg/m²   Physical Exam   Constitutional: He is oriented to person, place, and time. He appears well-developed and well-nourished. No distress. HENT:   Head: Normocephalic and atraumatic. Eyes: Pupils are equal, round, and reactive to light. Right eye exhibits no discharge. Left eye exhibits no discharge. Neck: No JVD present. No tracheal deviation present. Cardiovascular: Normal rate, regular rhythm, normal heart sounds and intact distal pulses. Exam reveals no gallop and no friction rub. No murmur heard. No carotid bruit   Pulmonary/Chest: Effort normal and breath sounds normal. No respiratory distress. He has no wheezes. He has no rales. Abdominal: Soft. There is no tenderness. Musculoskeletal: He exhibits no edema.    Normal gait and station   Neurological: He is alert and oriented to problems  Cardiovascular Fitness-Exercise as tolerated. Strive for 15 minutes of exercise most days of the week. Cardiac / Healthy Diet  Continue current medications as directed  Continue plan of treatment  It is always recommended that you bring your medications bottles with you to each visit - this is for your safety!        River Staff, APRN

## 2018-04-10 ENCOUNTER — HOSPITAL ENCOUNTER (OUTPATIENT)
Dept: NON INVASIVE DIAGNOSTICS | Age: 83
Discharge: HOME | End: 2018-04-10
Payer: MEDICARE

## 2018-04-10 ENCOUNTER — HOSPITAL ENCOUNTER (OUTPATIENT)
Dept: NUCLEAR MEDICINE | Age: 83
Discharge: HOME | End: 2018-04-12
Payer: MEDICARE

## 2018-04-10 ENCOUNTER — HOSPITAL ENCOUNTER (OUTPATIENT)
Dept: NUCLEAR MEDICINE | Age: 83
End: 2018-04-10
Payer: MEDICARE

## 2018-04-10 DIAGNOSIS — R06.02 SHORTNESS OF BREATH: ICD-10-CM

## 2018-04-10 DIAGNOSIS — I10 ESSENTIAL HYPERTENSION: ICD-10-CM

## 2018-04-10 LAB
LV EF: 50 %
LVEF MODALITY: NORMAL

## 2018-04-10 PROCEDURE — 6360000002 HC RX W HCPCS: Performed by: INTERNAL MEDICINE

## 2018-04-10 PROCEDURE — 6360000004 HC RX CONTRAST MEDICATION: Performed by: INTERNAL MEDICINE

## 2018-04-10 PROCEDURE — A9500 TC99M SESTAMIBI: HCPCS | Performed by: INTERNAL MEDICINE

## 2018-04-10 PROCEDURE — 78452 HT MUSCLE IMAGE SPECT MULT: CPT

## 2018-04-10 PROCEDURE — C8929 TTE W OR WO FOL WCON,DOPPLER: HCPCS

## 2018-04-10 PROCEDURE — 93017 CV STRESS TEST TRACING ONLY: CPT

## 2018-04-10 PROCEDURE — 3430000000 HC RX DIAGNOSTIC RADIOPHARMACEUTICAL: Performed by: INTERNAL MEDICINE

## 2018-04-10 RX ADMIN — TETRAKIS(2-METHOXYISOBUTYLISOCYANIDE)COPPER(I) TETRAFLUOROBORATE 30 MILLICURIE: 1 INJECTION, POWDER, LYOPHILIZED, FOR SOLUTION INTRAVENOUS at 12:18

## 2018-04-10 RX ADMIN — PERFLUTREN 2.2 MG: 6.52 INJECTION, SUSPENSION INTRAVENOUS at 11:15

## 2018-04-10 RX ADMIN — REGADENOSON 0.4 MG: 0.08 INJECTION, SOLUTION INTRAVENOUS at 12:18

## 2018-04-10 RX ADMIN — TETRAKIS(2-METHOXYISOBUTYLISOCYANIDE)COPPER(I) TETRAFLUOROBORATE 10 MILLICURIE: 1 INJECTION, POWDER, LYOPHILIZED, FOR SOLUTION INTRAVENOUS at 12:18

## 2018-04-11 ENCOUNTER — TELEPHONE (OUTPATIENT)
Dept: CARDIOLOGY | Age: 83
End: 2018-04-11

## 2018-07-01 DIAGNOSIS — I25.10 CORONARY ARTERY DISEASE INVOLVING NATIVE HEART WITHOUT ANGINA PECTORIS, UNSPECIFIED VESSEL OR LESION TYPE: ICD-10-CM

## 2018-07-02 RX ORDER — CLOPIDOGREL BISULFATE 75 MG/1
TABLET ORAL
Qty: 30 TABLET | Refills: 5 | Status: SHIPPED | OUTPATIENT
Start: 2018-07-02 | End: 2018-12-23 | Stop reason: SDUPTHER

## 2018-08-08 DIAGNOSIS — E87.79 OTHER FLUID OVERLOAD: ICD-10-CM

## 2018-08-08 RX ORDER — BUMETANIDE 1 MG/1
TABLET ORAL
Qty: 90 TABLET | Refills: 3 | Status: SHIPPED | OUTPATIENT
Start: 2018-08-08 | End: 2019-08-30 | Stop reason: SDUPTHER

## 2018-09-27 ENCOUNTER — OFFICE VISIT (OUTPATIENT)
Dept: CARDIOLOGY | Age: 83
End: 2018-09-27
Payer: MEDICARE

## 2018-09-27 VITALS
WEIGHT: 228 LBS | BODY MASS INDEX: 35.79 KG/M2 | DIASTOLIC BLOOD PRESSURE: 62 MMHG | HEIGHT: 67 IN | HEART RATE: 72 BPM | SYSTOLIC BLOOD PRESSURE: 132 MMHG

## 2018-09-27 DIAGNOSIS — I10 ESSENTIAL HYPERTENSION: Primary | ICD-10-CM

## 2018-09-27 DIAGNOSIS — I25.10 CORONARY ARTERY DISEASE INVOLVING NATIVE CORONARY ARTERY OF NATIVE HEART WITHOUT ANGINA PECTORIS: ICD-10-CM

## 2018-09-27 DIAGNOSIS — E78.2 MIXED HYPERLIPIDEMIA: ICD-10-CM

## 2018-09-27 DIAGNOSIS — I51.89 DIASTOLIC DYSFUNCTION: ICD-10-CM

## 2018-09-27 PROCEDURE — 99214 OFFICE O/P EST MOD 30 MIN: CPT | Performed by: NURSE PRACTITIONER

## 2018-09-27 PROCEDURE — G8427 DOCREV CUR MEDS BY ELIG CLIN: HCPCS | Performed by: NURSE PRACTITIONER

## 2018-09-27 PROCEDURE — 1101F PT FALLS ASSESS-DOCD LE1/YR: CPT | Performed by: NURSE PRACTITIONER

## 2018-09-27 PROCEDURE — G8417 CALC BMI ABV UP PARAM F/U: HCPCS | Performed by: NURSE PRACTITIONER

## 2018-09-27 RX ORDER — NITROGLYCERIN 0.4 MG/1
TABLET SUBLINGUAL
Qty: 25 TABLET | Refills: 3 | Status: SHIPPED | OUTPATIENT
Start: 2018-09-27 | End: 2020-10-08

## 2018-09-27 RX ORDER — ACETAMINOPHEN 160 MG
2000 TABLET,DISINTEGRATING ORAL 2 TIMES DAILY
COMMUNITY
End: 2021-09-30

## 2018-09-27 NOTE — PROGRESS NOTES
[facility], [place]. [address] [facility] [address] [facility]  [phone] office  [phone] fax      OFFICE VISIT:  2018    Joe Henderson - : 1935    Reason For Visit:  Judie Restrepo is a 81 y.o. male who is here for 6 Month Follow-Up (Patient presents for cardiology follow up doing well.); Coronary Artery Disease; Hypertension; and Hyperlipidemia    The patient presents today for cardiology follow up. Overall, the patient is doing well from a cardiac standpoint without symptoms to suggest myocardial ischemia or diastolic heart failure. BP is well controlled on current regimen. The patient's PCP monitors cholesterol. Tolerating statin therapy. Patient does not smoke. He rides stationary bike 3 times weekly for 12 miles. Reports some stable MOLINA. Subjective  Judie Restrepo denies exertional chest pain, orthopnea, paroxysmal nocturnal dyspnea, syncope, presyncope, sustained arrythmia, edema and fatigue. The patient denies numbness or weakness to suggest cerebrovascular accident or transient ischemic attack. + stable MOLINA. Joe Henderson has the following history as recorded in [facility]:    Patient Active Problem List   Diagnosis Code    BPH (benign prostatic hyperplasia) N40.0    CAD (coronary artery disease) I25.10    Hyperlipidemia E78.5    LONG TERM ANTICOAGULENT USE     Hypertension I10    Panlobular emphysema ([address] C33.3    Diastolic dysfunction T03.2     Past Medical History:   Diagnosis Date    Benign hypertension     BPH (benign prostatic hyperplasia)     CAD (coronary artery disease)     Bypass Graft---LIMA grafted to the LAD and individual saphenous vein graft placed to a diagonal branch and a saphenous vein Y-graft to the first and second obtuse marginal branches.     Cancer ([address]     COPD (chronic obstructive pulmonary disease) (HCC)     Fracture of T12 vertebra (HCC)     Hyperglycemia     Hyperlipidemia     Hypertension     LONG TERM ANTICOAGULENT USE      Past Surgical History:   Procedure Laterality Date    CARDIAC CATHETERIZATION  4/25/04, 5/25/04    LEFT Heart Cath, see scanned report   [address]  2/8/01, 6/9/03    LEFT Heart Cath, see scanned report   [address]  8/5/15  [facility]    balloon angioplasty of in-stent stenosis of RCA    CARDIAC CATHETERIZATION  10/15/15  [facility]    stent to RCA. EF 55%    CARDIOVASCULAR STRESS TEST  04/24/08, [facility]    Stress Echo    CARPAL TUNNEL RELEASE  07/28/05, Gerardo    LEFT anterior subcutaneous ulnar nerve transpostion and release of Guyon's canal carpal tunnel release.     CORONARY ARTERY BYPASS GRAFT  07/2007    x 4    ELBOW SURGERY      Left     Family History   Problem Relation Age of Onset    Coronary Art Dis Mother     Hypertension Mother     Cancer Father     Cancer Sister      Social History   Substance Use Topics    Smoking status: Former Smoker     Types: Cigarettes    Smokeless tobacco: Never Used    Alcohol use No      Current Outpatient Prescriptions   Medication Sig Dispense Refill    Cholecalciferol (VITAMIN D3) 2000 units CAPS Take 2,000 Units by mouth 2 times daily      nitroGLYCERIN (NITROSTAT) 0.4 MG SL tablet PLACE 1 TABLET UNDER TONGUE AS NEEDED FOR CHEST PAIN 25 tablet 3    bumetanide (BUMEX) 1 MG tablet TAKE 1 TABLET BY MOUTH DAILY 90 tablet 3    clopidogrel (PLAVIX) 75 MG tablet TAKE 1 TABLET BY MOUTH EVERY DAY 30 tablet 5    carvedilol (COREG) 12.5 MG tablet Take 12.5 mg by mouth 2 times daily      simvastatin (ZOCOR) 40 MG tablet TAKE 1 TABLET BY MOUTH EVERY NIGHT 90 tablet 3    ramipril (ALTACE) 5 MG capsule TAKE 1 CAPSULE BY MOUTH DAILY 90 capsule 3    KLOR-CON M20 20 MEQ extended release tablet TAKE 1 TABLET BY MOUTH DAILY 90 tablet 3    levothyroxine (SYNTHROID) 25 MCG tablet Take 25 mcg by mouth Daily      cetirizine (ZYRTEC) 10 MG tablet Take 10 mg by mouth daily      mometasone (NASONEX) 50 MCG/ACT  There was noevidence of reversible ischemia. There was no  evidence of myocardial infarction. Conclusion:    Lexiscan without evidence of reversible ischemia      Lexiscan without evidence of myocardial infarction   Signed by Dr Ankit Redmond on 4/10/2018 3:34 PM     2D echo conclusions    Summary   Mitral valve leaflets are mildly thickened with preserved leaflet   mobility.   Trace mitral regurgitation.   Mildly thickened aortic valve leaflets with preserved leaflet mobility.   Mildly dilated left atrium.   Normal left ventricular size and function.   Mild concentric left ventricular hypertrophy.  E/A flow reversal noted. Suggestive of diastolic dysfunction.    Signature    ----------------------------------------------------------------   Electronically signed by Ankit Redmond MD(Interpreting   [email or web address]) on 04/10/2018 02:53 PM   ----------------------------------------------------------------    Findings    Mitral Valve   Mitral valve leaflets are mildly thickened with preserved leaflet   mobility.   Trace mitral regurgitation.    Aortic Valve   Mildly thickened aortic valve leaflets with preserved leaflet mobility.    Tricuspid Valve   Mild tricuspid regurgitation.    Pulmonic Valve   Mild pulmonic regurgitation present.    Left Atrium   Mildly dilated left atrium.    Left Ventricle   Normal left ventricular size and function.   Mild concentric left ventricular hypertrophy.  E/A flow reversal noted. Suggestive of diastolic dysfunction.    Right Atrium   Normal right atrial dimension with no evidence of thrombus or mass noted.    Right Ventricle   Normal right ventricular size with preserved RV function.    Pericardial Effusion   No evidence of significant pericardial effusion is noted. Stable CV status without symptoms of diastolic heart failure, arrhythmia or angina. Blood pressure well controlled on current regimen. Tolerating statin therapy. Lipids followed by PCP.   CAD medical management the cheek and gum. Do not swallow or chew the tablet; let it dissolve on its own. If necessary, a second and third tablet may be used, with five minutes between using each tablet. If you use a third tablet and your chest pain continues, it is time to seek immediate medical attention. Call 911 immediately and have someone drive you to the emergency room. You may be having a heart attack or other serious heart problem. · To prevent angina from exercise or stress, use 1 tablet 5 to 10 minutes before the activity.      Neri Maza, CHELSIE

## 2018-09-27 NOTE — PATIENT INSTRUCTIONS
exercise or stress, use 1 tablet 5 to 10 minutes before the activity. Patient Education        A Healthy Heart: Care Instructions  Your Care Instructions    Heart disease occurs when a substance called plaque builds up in the vessels that supply oxygen-rich blood to your heart. This can narrow the blood vessels and reduce blood flow. A heart attack happens when blood flow is completely blocked. A high-fat diet, smoking, and other factors increase the risk of heart disease. Your doctor has found that you have a chance of having heart disease. You can do lots of things to keep your heart healthy. It may not be easy, but you can change your diet, exercise more, and quit smoking. These steps really work to lower your chance of heart disease. Follow-up care is a key part of your treatment and safety. Be sure to make and go to all appointments, and call your doctor if you are having problems. It's also a good idea to know your test results and keep a list of the medicines you take. How can you care for yourself at home? Diet    · Use less salt when you cook and eat. This helps lower your blood pressure. Taste food before salting. Add only a little salt when you think you need it. With time, your taste buds will adjust to less salt.     · Eat fewer snack items, fast foods, canned soups, and other high-salt, high-fat, processed foods.     · Read food labels and try to avoid saturated and trans fats. They increase your risk of heart disease by raising cholesterol levels.     · Limit the amount of solid fat-butter, margarine, and shortening-you eat. Use olive, peanut, or canola oil when you cook. Bake, broil, and steam foods instead of frying them.     · Eating fish can lower your risk for heart disease. Eat at least 2 servings of fish a week. Stigler, mackerel, herring, sardines, and chunk light tuna are very good choices. These fish contain omega-3 fatty acids.     · Eat a variety of fruit and vegetables every day.

## 2018-11-25 DIAGNOSIS — I10 ESSENTIAL HYPERTENSION: ICD-10-CM

## 2018-11-26 RX ORDER — POTASSIUM CHLORIDE 1500 MG/1
20 TABLET, EXTENDED RELEASE ORAL DAILY
Qty: 90 TABLET | Refills: 3 | Status: SHIPPED | OUTPATIENT
Start: 2018-11-26 | End: 2019-11-29 | Stop reason: SDUPTHER

## 2018-12-23 DIAGNOSIS — I10 ESSENTIAL HYPERTENSION: ICD-10-CM

## 2018-12-23 DIAGNOSIS — I25.10 CORONARY ARTERY DISEASE INVOLVING NATIVE HEART WITHOUT ANGINA PECTORIS, UNSPECIFIED VESSEL OR LESION TYPE: ICD-10-CM

## 2018-12-26 RX ORDER — RAMIPRIL 5 MG/1
CAPSULE ORAL
Qty: 90 CAPSULE | Refills: 3 | Status: SHIPPED | OUTPATIENT
Start: 2018-12-26 | End: 2019-12-16 | Stop reason: SDUPTHER

## 2018-12-26 RX ORDER — CLOPIDOGREL BISULFATE 75 MG/1
TABLET ORAL
Qty: 30 TABLET | Refills: 5 | Status: SHIPPED | OUTPATIENT
Start: 2018-12-26 | End: 2019-08-05 | Stop reason: SDUPTHER

## 2019-02-26 DIAGNOSIS — E78.2 MIXED HYPERLIPIDEMIA: Primary | ICD-10-CM

## 2019-03-08 DIAGNOSIS — E78.2 MIXED HYPERLIPIDEMIA: ICD-10-CM

## 2019-03-08 RX ORDER — SIMVASTATIN 40 MG
TABLET ORAL
Qty: 90 TABLET | Refills: 0 | Status: SHIPPED | OUTPATIENT
Start: 2019-03-08 | End: 2019-05-31 | Stop reason: SDUPTHER

## 2019-04-12 ENCOUNTER — OFFICE VISIT (OUTPATIENT)
Dept: CARDIOLOGY | Age: 84
End: 2019-04-12
Payer: MEDICARE

## 2019-04-12 VITALS
BODY MASS INDEX: 35.63 KG/M2 | HEIGHT: 67 IN | HEART RATE: 63 BPM | SYSTOLIC BLOOD PRESSURE: 124 MMHG | DIASTOLIC BLOOD PRESSURE: 60 MMHG | WEIGHT: 227 LBS

## 2019-04-12 DIAGNOSIS — I25.119 CORONARY ARTERY DISEASE WITH ANGINA PECTORIS, UNSPECIFIED VESSEL OR LESION TYPE, UNSPECIFIED WHETHER NATIVE OR TRANSPLANTED HEART (HCC): Primary | ICD-10-CM

## 2019-04-12 PROCEDURE — 1123F ACP DISCUSS/DSCN MKR DOCD: CPT | Performed by: INTERNAL MEDICINE

## 2019-04-12 PROCEDURE — G8417 CALC BMI ABV UP PARAM F/U: HCPCS | Performed by: INTERNAL MEDICINE

## 2019-04-12 PROCEDURE — 4040F PNEUMOC VAC/ADMIN/RCVD: CPT | Performed by: INTERNAL MEDICINE

## 2019-04-12 PROCEDURE — 1036F TOBACCO NON-USER: CPT | Performed by: INTERNAL MEDICINE

## 2019-04-12 PROCEDURE — 93000 ELECTROCARDIOGRAM COMPLETE: CPT | Performed by: INTERNAL MEDICINE

## 2019-04-12 PROCEDURE — G8427 DOCREV CUR MEDS BY ELIG CLIN: HCPCS | Performed by: INTERNAL MEDICINE

## 2019-04-12 PROCEDURE — G8598 ASA/ANTIPLAT THER USED: HCPCS | Performed by: INTERNAL MEDICINE

## 2019-04-12 PROCEDURE — 99213 OFFICE O/P EST LOW 20 MIN: CPT | Performed by: INTERNAL MEDICINE

## 2019-04-12 RX ORDER — GLIPIZIDE 2.5 MG/1
2.5 TABLET, EXTENDED RELEASE ORAL DAILY
COMMUNITY

## 2019-04-12 ASSESSMENT — ENCOUNTER SYMPTOMS
NAUSEA: 0
WHEEZING: 0
APNEA: 0
BLOOD IN STOOL: 0
COUGH: 0
ABDOMINAL DISTENTION: 0
BACK PAIN: 1
CHEST TIGHTNESS: 0
CHOKING: 0
SHORTNESS OF BREATH: 1

## 2019-04-12 NOTE — PROGRESS NOTES
FOR CHEST PAIN, Disp: 25 tablet, Rfl: 3    bumetanide (BUMEX) 1 MG tablet, TAKE 1 TABLET BY MOUTH DAILY, Disp: 90 tablet, Rfl: 3    carvedilol (COREG) 12.5 MG tablet, Take 12.5 mg by mouth 2 times daily, Disp: , Rfl:     levothyroxine (SYNTHROID) 25 MCG tablet, Take 25 mcg by mouth Daily, Disp: , Rfl:     cetirizine (ZYRTEC) 10 MG tablet, Take 10 mg by mouth daily, Disp: , Rfl:     mometasone (NASONEX) 50 MCG/ACT nasal spray, 2 sprays daily , Disp: , Rfl:     theophylline CR, 12 hour, (THEODUR) 300 MG CR tablet, Take 300 mg by mouth every 8 hours 1/2 tablet tid , Disp: , Rfl:     albuterol (PROVENTIL HFA;VENTOLIN HFA) 108 (90 BASE) MCG/ACT inhaler, Inhale 2 puffs into the lungs every 4 hours as needed for Wheezing, Disp: , Rfl:     Multiple Vitamins-Minerals (CENTRUM SILVER) TABS, Take 1 tablet by mouth daily. , Disp: , Rfl:     meloxicam (MOBIC) 15 MG tablet, Take 15 mg by mouth daily. , Disp: , Rfl:     aspirin 81 MG tablet, Take 81 mg by mouth daily. , Disp: , Rfl:     omeprazole (PRILOSEC) 20 MG capsule, Take 20 mg by mouth daily. , Disp: , Rfl:     montelukast (SINGULAIR) 10 MG tablet, Take 10 mg by mouth nightly.  , Disp: , Rfl:     Review of Systems   Constitutional: Negative for activity change (MOLINA limits), diaphoresis, fatigue and unexpected weight change. HENT: Negative for congestion, hearing loss and tinnitus. Eyes: Negative for visual disturbance. Respiratory: Positive for shortness of breath. Negative for apnea, cough, choking, chest tightness and wheezing. Past tobacco abuse - quit about time of CABG (1996)   Cardiovascular: Negative for chest pain, palpitations and leg swelling. 1996 (or 97) CABG x 4  Stents in 04 and 05   10/15 Stent dilated   4/18 Negative Lexiscan   Gastrointestinal: Negative for abdominal distention, blood in stool and nausea. Endocrine: Negative for cold intolerance and heat intolerance.         Dyslipidemia and Hypothyroidism   Genitourinary: Negative for difficulty urinating. Musculoskeletal: Positive for back pain. Negative for arthralgias and myalgias. Skin: Negative for rash. Neurological: Negative for dizziness, tremors, seizures, syncope, speech difficulty, weakness, light-headedness, numbness and headaches. Hematological: Does not bruise/bleed easily. Psychiatric/Behavioral: Negative for agitation, confusion, dysphoric mood and sleep disturbance. The patient is not nervous/anxious. All other systems reviewed and are negative. /60   Pulse 63   Ht 5' 7\" (1.702 m)   Wt 227 lb (103 kg)   BMI 35.55 kg/m²     Last BP Reading:  BP Readings from Last 3 Encounters:   04/12/19 124/60   09/27/18 132/62   03/21/18 138/62        Physical Exam   Constitutional: He is oriented to person, place, and time. No distress. Endomorphic elderly gentleman   HENT:   Head: Normocephalic. Right Ear: Hearing and external ear normal.   Left Ear: Hearing and external ear normal.   Nose: Nose normal.   Mouth/Throat: Normal dentition. Eyes: Pupils are equal, round, and reactive to light. Conjunctivae and EOM are normal. Right eye exhibits no discharge. Left eye exhibits no discharge. Neck: No JVD (none at 45 degrees) present. Carotid bruit is not present. No thyroid mass and no thyromegaly present. Cardiovascular: Normal rate, regular rhythm, normal heart sounds and normal pulses. PMI is not displaced. Exam reveals no gallop, no distant heart sounds and no friction rub. No murmur heard. Pulmonary/Chest: Effort normal and breath sounds normal. No respiratory distress. He has no wheezes. He has no rales. He exhibits no tenderness. Healed sternotomy scar    Abdominal: Soft. Normal appearance and bowel sounds are normal. He exhibits no distension, no pulsatile liver and no mass. There is no tenderness. There is no rebound and no guarding. Obese abdomen with bruit   Musculoskeletal: He exhibits no edema or deformity.    Neurological: He is alert and oriented to person, place, and time. Skin: Skin is warm. No rash noted. He is not diaphoretic. No erythema. No pallor. Psychiatric: He has a normal mood and affect. Thought content normal. His mood appears not anxious. He is not agitated. Vitals reviewed. Orders Placed This Encounter   Procedures    EKG 12 lead     Order Specific Question:   Reason for Exam?     Answer:   Coronary artery disease     No orders of the defined types were placed in this encounter. ECG interpretation from today:   Normal sinus rhythm with a left bundle branch block    Assessment / Plan:  1. Hypertension - controlled  2. Dyslipidemia - LDL 54, HDL 28, triglycerides 172. Acceptable  3.   Coronary disease - clinically stable with negative Lexiscan last obtained in April 2018      Electronically sign by Korene Hodgkins, MD 4/12/2019 1:57 PM

## 2019-05-10 ENCOUNTER — TELEPHONE (OUTPATIENT)
Dept: CARDIOLOGY | Age: 84
End: 2019-05-10

## 2019-05-10 NOTE — TELEPHONE ENCOUNTER
Date: TBD    Cardiologist: Dr. Mary Jo Mayberry    Procedure: Tooth ext with general anes. Surgeon: Dr. Cony Aponte Visit: 4-12-19  Reason for office visit and medical concerns addressed at this office visit: CAD, HTN, Hyperlipidemia, hyperglycemia, COPD. Testing Performed and Date of Service:  Celia 4-10-18, EKG 4-12-19  Does the patient have a stent? If so, what type? Stent 10-15-15    Current Medications: Plavix, Glipizide, Zocor, Altace, Klor-con , nitro, Bumex, Coreg, Synthroid, Zyrtec, Nasonex, Theodur, Mobic, Asa Prilosec, Singulair    Is the patient currently taking an anticoagulant? Plavix, ASA 81 mg    Additional Notes: Hold Plavix 3-5 days before procedure.

## 2019-05-31 DIAGNOSIS — E78.2 MIXED HYPERLIPIDEMIA: ICD-10-CM

## 2019-05-31 RX ORDER — SIMVASTATIN 40 MG
TABLET ORAL
Qty: 90 TABLET | Refills: 3 | Status: SHIPPED | OUTPATIENT
Start: 2019-05-31 | End: 2020-05-26

## 2019-08-05 DIAGNOSIS — I25.10 CORONARY ARTERY DISEASE INVOLVING NATIVE HEART WITHOUT ANGINA PECTORIS, UNSPECIFIED VESSEL OR LESION TYPE: ICD-10-CM

## 2019-08-05 RX ORDER — CLOPIDOGREL BISULFATE 75 MG/1
TABLET ORAL
Qty: 30 TABLET | Refills: 5 | Status: SHIPPED | OUTPATIENT
Start: 2019-08-05 | End: 2020-04-20

## 2019-08-30 DIAGNOSIS — E87.79 OTHER FLUID OVERLOAD: ICD-10-CM

## 2019-08-30 RX ORDER — BUMETANIDE 1 MG/1
TABLET ORAL
Qty: 90 TABLET | Refills: 3 | Status: SHIPPED | OUTPATIENT
Start: 2019-08-30 | End: 2020-08-21

## 2019-10-14 ENCOUNTER — OFFICE VISIT (OUTPATIENT)
Dept: CARDIOLOGY | Age: 84
End: 2019-10-14
Payer: MEDICARE

## 2019-10-14 VITALS
HEIGHT: 67 IN | HEART RATE: 70 BPM | WEIGHT: 223 LBS | DIASTOLIC BLOOD PRESSURE: 70 MMHG | SYSTOLIC BLOOD PRESSURE: 144 MMHG | BODY MASS INDEX: 35 KG/M2

## 2019-10-14 DIAGNOSIS — I51.89 DIASTOLIC DYSFUNCTION: ICD-10-CM

## 2019-10-14 DIAGNOSIS — I10 ESSENTIAL HYPERTENSION: ICD-10-CM

## 2019-10-14 DIAGNOSIS — I25.10 CORONARY ARTERY DISEASE INVOLVING NATIVE CORONARY ARTERY OF NATIVE HEART WITHOUT ANGINA PECTORIS: Primary | ICD-10-CM

## 2019-10-14 DIAGNOSIS — E78.2 MIXED HYPERLIPIDEMIA: ICD-10-CM

## 2019-10-14 DIAGNOSIS — Z95.1 HX OF CABG: ICD-10-CM

## 2019-10-14 PROCEDURE — G8427 DOCREV CUR MEDS BY ELIG CLIN: HCPCS | Performed by: NURSE PRACTITIONER

## 2019-10-14 PROCEDURE — 1123F ACP DISCUSS/DSCN MKR DOCD: CPT | Performed by: NURSE PRACTITIONER

## 2019-10-14 PROCEDURE — G8484 FLU IMMUNIZE NO ADMIN: HCPCS | Performed by: NURSE PRACTITIONER

## 2019-10-14 PROCEDURE — 99213 OFFICE O/P EST LOW 20 MIN: CPT | Performed by: NURSE PRACTITIONER

## 2019-10-14 PROCEDURE — 4040F PNEUMOC VAC/ADMIN/RCVD: CPT | Performed by: NURSE PRACTITIONER

## 2019-10-14 PROCEDURE — G8417 CALC BMI ABV UP PARAM F/U: HCPCS | Performed by: NURSE PRACTITIONER

## 2019-10-14 PROCEDURE — 1036F TOBACCO NON-USER: CPT | Performed by: NURSE PRACTITIONER

## 2019-10-14 PROCEDURE — G8598 ASA/ANTIPLAT THER USED: HCPCS | Performed by: NURSE PRACTITIONER

## 2019-10-14 RX ORDER — TRAMADOL HYDROCHLORIDE 50 MG/1
50 TABLET ORAL EVERY 6 HOURS PRN
COMMUNITY

## 2019-11-29 DIAGNOSIS — I10 ESSENTIAL HYPERTENSION: ICD-10-CM

## 2019-12-02 RX ORDER — POTASSIUM CHLORIDE 1500 MG/1
TABLET, EXTENDED RELEASE ORAL
Qty: 90 TABLET | Refills: 3 | Status: SHIPPED | OUTPATIENT
Start: 2019-12-02 | End: 2020-12-07

## 2019-12-16 DIAGNOSIS — I10 ESSENTIAL HYPERTENSION: ICD-10-CM

## 2019-12-16 RX ORDER — RAMIPRIL 5 MG/1
CAPSULE ORAL
Qty: 90 CAPSULE | Refills: 3 | Status: SHIPPED | OUTPATIENT
Start: 2019-12-16 | End: 2020-12-17

## 2020-03-30 RX ORDER — MONTELUKAST SODIUM 10 MG/1
10 TABLET ORAL NIGHTLY
Qty: 30 TABLET | Refills: 3 | Status: SHIPPED | OUTPATIENT
Start: 2020-03-30

## 2020-04-03 ENCOUNTER — TELEPHONE (OUTPATIENT)
Dept: CARDIOLOGY | Age: 85
End: 2020-04-03

## 2020-04-20 RX ORDER — CLOPIDOGREL BISULFATE 75 MG/1
TABLET ORAL
Qty: 90 TABLET | Refills: 1 | Status: SHIPPED | OUTPATIENT
Start: 2020-04-20 | End: 2020-10-15

## 2020-05-26 RX ORDER — SIMVASTATIN 40 MG
TABLET ORAL
Qty: 90 TABLET | Refills: 3 | Status: SHIPPED | OUTPATIENT
Start: 2020-05-26 | End: 2021-05-24

## 2020-08-04 ENCOUNTER — OFFICE VISIT (OUTPATIENT)
Dept: CARDIOLOGY | Age: 85
End: 2020-08-04
Payer: MEDICARE

## 2020-08-04 VITALS
WEIGHT: 224 LBS | DIASTOLIC BLOOD PRESSURE: 64 MMHG | SYSTOLIC BLOOD PRESSURE: 128 MMHG | HEIGHT: 68 IN | HEART RATE: 68 BPM | BODY MASS INDEX: 33.95 KG/M2

## 2020-08-04 PROCEDURE — 99213 OFFICE O/P EST LOW 20 MIN: CPT | Performed by: INTERNAL MEDICINE

## 2020-08-04 PROCEDURE — G8428 CUR MEDS NOT DOCUMENT: HCPCS | Performed by: INTERNAL MEDICINE

## 2020-08-04 PROCEDURE — G8417 CALC BMI ABV UP PARAM F/U: HCPCS | Performed by: INTERNAL MEDICINE

## 2020-08-04 PROCEDURE — 4040F PNEUMOC VAC/ADMIN/RCVD: CPT | Performed by: INTERNAL MEDICINE

## 2020-08-04 PROCEDURE — 93000 ELECTROCARDIOGRAM COMPLETE: CPT | Performed by: INTERNAL MEDICINE

## 2020-08-04 PROCEDURE — 1123F ACP DISCUSS/DSCN MKR DOCD: CPT | Performed by: INTERNAL MEDICINE

## 2020-08-04 PROCEDURE — 1036F TOBACCO NON-USER: CPT | Performed by: INTERNAL MEDICINE

## 2020-08-04 RX ORDER — POTASSIUM CHLORIDE 1.5 G/1.77G
20 POWDER, FOR SOLUTION ORAL DAILY
COMMUNITY
End: 2021-09-30 | Stop reason: SDUPTHER

## 2020-08-04 NOTE — PROGRESS NOTES
80-year-old gentleman with a history of combined hyperlipidemia, prior tobacco abuse/COPD, hypertension, and coronary disease returns for routine follow-up. History of bypass grafting in 1996. In 2003 he had 2 stents placed in his right coronary, and May 2000 for another stent placed in his right. In August 2015 he had a balloon angioplasty of in-stent restenosis and in October 2015 2 additional stents. More recently he underwent Lexiscan dual-isotope testing in April 2018 which was negative. On return today he denies any recent right arm discomfort reminiscent of his prior angina. He is active but does not exercise on a routine basis. His blood pressure has been controlled and his lipids last reported in March 2019 revealed an LDL of 54, depressed HDL of 28, and triglycerides mildly elevated at 172. He is aware of the need for social distancing and is practicing such. On exam carries 224 pounds in a 5 foot 8 inch frame. Pressure is 128/64 with pulse of 68. Friendly obese elderly gentleman. EOMs full, sclerae and conjunctiva normal. PERRLA. Mask in place. Trachea midline with no neck masses. Assessment of internal jugular veins reveals no elevation of central venous pressure at 45 degrees. Carotid pulses normal without delay or bruit. Thyroid normal to palpation. Healed midline sternotomy scar. Chest exam reveals normal respiratory effort, no abnormal breath sounds and normal expiratory phase. No skin lesions seen. PMI normal. S1, S2 normal without murmur or florencia or click. Obese protuberant abdomen. Normal bowel sounds without palpable mass or bruit. No clubbing or acrocyanosis. No significant lower extremity edema or signs of venous insufficiency. General motor strength appears to be within normal limits. Normal range of motion with normal gait. Alert, oriented x 3, memory and cognition normal as reflected by history and conversation.   EKG reveals a sinus rhythm with an intraventricular conduction defect and left axis deviation. Assessment/plan:  1. Coronary disease -clinically stable without recurrent right arm discomfort which represented his previous angina. Last stress test in 2018. 2.  Hypertension -, adequate control  3. Dyslipidemia -last recorded value year and a half ago with acceptable LDL and depressed HDL  4. Social distancing -aware and practicing    Medical records reviewed prior to today's clinic visit including visually reviewing recent diagnostic studies such as ECHOs and angiograms. More than 15 minutes spent face-to-face with patient in evaluating, and carefully explaining problems and the planned approach and the reasons behind the decisions.

## 2020-08-21 RX ORDER — BUMETANIDE 1 MG/1
TABLET ORAL
Qty: 90 TABLET | Refills: 3 | Status: SHIPPED | OUTPATIENT
Start: 2020-08-21 | End: 2021-11-09

## 2020-08-24 RX ORDER — MONTELUKAST SODIUM 10 MG/1
TABLET ORAL
Qty: 90 TABLET | Refills: 1 | OUTPATIENT
Start: 2020-08-24

## 2020-09-10 RX ORDER — MONTELUKAST SODIUM 10 MG/1
TABLET ORAL
Qty: 90 TABLET | Refills: 1 | OUTPATIENT
Start: 2020-09-10

## 2020-09-22 ENCOUNTER — TELEPHONE (OUTPATIENT)
Dept: NEUROSURGERY | Age: 85
End: 2020-09-22

## 2020-09-22 NOTE — TELEPHONE ENCOUNTER
Attempted to contact pt regarding referral to Neurosurgery. No answer, left VM for a return call to schedule new patient appointment.

## 2020-09-23 ENCOUNTER — TELEPHONE (OUTPATIENT)
Dept: NEUROSURGERY | Age: 85
End: 2020-09-23

## 2020-09-23 NOTE — TELEPHONE ENCOUNTER
[facility] New Patient Questionnaire    1. Diagnosis/Reason for Referral? Spinal Stenosis, cervical region        2. Who is completing questionnaire? Patient  X Caregiver Family      3. Has the patient had any previous spinal/brain surgeries? NO        A. If yes, what is the name of the facility in which the surgery was performed? B. Procedure/Surgery performed? C. Who was the surgeon? D. When was the surgery? MM/YY       E. Did the patient improve after the surgery? 4. Is this a second opinion? If yes, Dr. Polo Talley would like to review patient first before making the appointment. 5. Have MRI Images been obtain within the last year? Yes X  No      XR  CT     If yes, where was the imaging performed? [facility]     If yes, what part of the body? Lumbar  Cervical X  Thoracic  Brain     If yes, when was it obtained? MM/YY    Note: if the scan was performed at a facility other than [facility], the disc will need to be brought to the appointment or we need to reach out to obtain the disc. A. Was the patient instructed to provide the disc? Yes X  No      8. Has the patient had a NCV/EMG within the last year? Yes  No X     If yes, where was it performed and date? MM/YY  Location:      9. Has the patient been to Physical Therapy? Yes  No X     If yes, what location, how long attended, and last visit? Location:        Therapy Lasted:    Date of Last Visit:      10. Has the patient been to Pain Management? Yes  No X     If yes, what location and last visit     Location:   Last Visit:   Is it helping?

## 2020-10-08 ENCOUNTER — OFFICE VISIT (OUTPATIENT)
Dept: NEUROSURGERY | Age: 85
End: 2020-10-08
Payer: MEDICARE

## 2020-10-08 VITALS
HEART RATE: 67 BPM | DIASTOLIC BLOOD PRESSURE: 68 MMHG | OXYGEN SATURATION: 97 % | HEIGHT: 68 IN | WEIGHT: 210 LBS | BODY MASS INDEX: 31.83 KG/M2 | SYSTOLIC BLOOD PRESSURE: 141 MMHG

## 2020-10-08 PROCEDURE — 99204 OFFICE O/P NEW MOD 45 MIN: CPT | Performed by: NURSE PRACTITIONER

## 2020-10-08 PROCEDURE — G8417 CALC BMI ABV UP PARAM F/U: HCPCS | Performed by: NURSE PRACTITIONER

## 2020-10-08 PROCEDURE — 1123F ACP DISCUSS/DSCN MKR DOCD: CPT | Performed by: NURSE PRACTITIONER

## 2020-10-08 PROCEDURE — G8427 DOCREV CUR MEDS BY ELIG CLIN: HCPCS | Performed by: NURSE PRACTITIONER

## 2020-10-08 PROCEDURE — 4040F PNEUMOC VAC/ADMIN/RCVD: CPT | Performed by: NURSE PRACTITIONER

## 2020-10-08 PROCEDURE — 1036F TOBACCO NON-USER: CPT | Performed by: NURSE PRACTITIONER

## 2020-10-08 PROCEDURE — G8484 FLU IMMUNIZE NO ADMIN: HCPCS | Performed by: NURSE PRACTITIONER

## 2020-10-08 RX ORDER — OXYCODONE HYDROCHLORIDE AND ACETAMINOPHEN 5; 325 MG/1; MG/1
1 TABLET ORAL EVERY 6 HOURS PRN
COMMUNITY

## 2020-10-08 ASSESSMENT — ENCOUNTER SYMPTOMS
EYES NEGATIVE: 1
RESPIRATORY NEGATIVE: 1
GASTROINTESTINAL NEGATIVE: 1

## 2020-10-08 NOTE — PROGRESS NOTES
Review of Systems   Constitutional: Negative. HENT: Negative. Eyes: Negative. Respiratory: Negative. Cardiovascular: Negative. Gastrointestinal: Negative. Genitourinary: Negative. Musculoskeletal: Positive for neck pain. Skin: Negative. Neurological: Negative. Endo/Heme/Allergies: Negative. Psychiatric/Behavioral: Negative.

## 2020-10-08 NOTE — PROGRESS NOTES
04/24/08, MDL    Stress Echo    CARPAL TUNNEL RELEASE  07/28/05, Gerardo    LEFT anterior subcutaneous ulnar nerve transpostion and release of Guyon's canal carpal tunnel release.  CORONARY ARTERY BYPASS GRAFT  07/2007    x 4    ELBOW SURGERY      Left       Current Outpatient Medications   Medication Sig Dispense Refill    oxyCODONE-acetaminophen (PERCOCET) 5-325 MG per tablet Take 1 tablet by mouth every 6 hours as needed for Pain.  bumetanide (BUMEX) 1 MG tablet TAKE 1 TABLET BY MOUTH EVERY DAY 90 tablet 3    simvastatin (ZOCOR) 40 MG tablet TAKE 1 TABLET BY MOUTH EVERY DAY AT NIGHT 90 tablet 3    clopidogrel (PLAVIX) 75 MG tablet TAKE 1 TABLET BY MOUTH EVERY DAY 90 tablet 1    ramipril (ALTACE) 5 MG capsule TAKE 1 CAPSULE BY MOUTH EVERY DAY (Patient taking differently: 10 mg ) 90 capsule 3    traMADol (ULTRAM) 50 MG tablet Take 50 mg by mouth every 6 hours as needed for Pain.  glipiZIDE (GLUCOTROL XL) 2.5 MG extended release tablet Take 2.5 mg by mouth daily      carvedilol (COREG) 12.5 MG tablet Take 12.5 mg by mouth 2 times daily      levothyroxine (SYNTHROID) 25 MCG tablet Take 25 mcg by mouth Daily      theophylline CR, 12 hour, (THEODUR) 300 MG CR tablet Take 300 mg by mouth every 8 hours 1/2 tablet tid       albuterol (PROVENTIL HFA;VENTOLIN HFA) 108 (90 BASE) MCG/ACT inhaler Inhale 2 puffs into the lungs every 4 hours as needed for Wheezing      omeprazole (PRILOSEC) 20 MG capsule Take 20 mg by mouth daily.  montelukast (SINGULAIR) 10 MG tablet Take 10 mg by mouth nightly.  triamcinolone (KENALOG) 0.1 % ointment Apply topically 2 times daily Apply topically 2 times daily.       Misc Natural Products (DEEP SLEEP PO) Take by mouth daily      potassium chloride (KLOR-CON) 20 MEQ packet Take 20 mEq by mouth daily      KLOR-CON M20 20 MEQ extended release tablet TAKE 1 TABLET BY MOUTH EVERY DAY (Patient not taking: Reported on 10/8/2020) 90 tablet 3    Cholecalciferol (VITAMIN D3) 2000 units CAPS Take 2,000 Units by mouth 2 times daily      cetirizine (ZYRTEC) 10 MG tablet Take 10 mg by mouth daily      mometasone (NASONEX) 50 MCG/ACT nasal spray 2 sprays daily       Multiple Vitamins-Minerals (CENTRUM SILVER) TABS Take 1 tablet by mouth daily.  aspirin 81 MG tablet Take 81 mg by mouth daily. No current facility-administered medications for this visit. Allergies:  Adhesive tape and Wound dressing adhesive    Social History:   Social History     Tobacco Use   Smoking Status Former Smoker    Types: Cigarettes   Smokeless Tobacco Never Used     Social History     Substance and Sexual Activity   Alcohol Use No         Family History:   Family History   Problem Relation Age of Onset    Coronary Art Dis Mother     Hypertension Mother     Cancer Father     Cancer Sister     Pacemaker Brother     Dementia Brother     Stroke Sister        REVIEW OF SYSTEMS:  Constitutional: Negative. HENT: Negative. Eyes: Negative. Respiratory: Negative. Cardiovascular: Negative. Gastrointestinal: Negative. Genitourinary: Negative. Musculoskeletal: Positive for neck pain. Skin: Negative. Neurological: Negative. Endo/Heme/Allergies: Negative. Psychiatric/Behavioral: Negative. PHYSICAL EXAM:  Vitals:    10/08/20 1117   BP: (!) 141/68   Pulse: 67   SpO2: 97%     Constitutional: appears well-developed and well-nourished. Eyes - conjunctiva normal.  Pupils react to light  Ear, nose, throat - hearing intact to finger rub, No scars, masses, or lesions over external nose or ears, no atrophy oftongue  Neck- symmetric, no masses noted, no jugular vein distension  Respiration- chest wall appears symmetric, good expansion, normal effort without use of accessory muscles  Musculoskeletal - no significant wasting of muscles noted, no bony deformities, gait no gross ataxia  Extremities- no clubbing, cyanosis oredema  Skin - warm, dry, and intact. No rash, erythema, or pallor. Psychiatric - mood, affect, and behavior appear normal.     Neurologic Examination  Awake, Alert and oriented x 4  Normal speech pattern, following commands    Motor:  RIGHT: hand grasp 5/5    finger extension 5/5    bicep 5/5    triceps 5/5    deltoid 5/5    LEFT:   hand grasp 5/5    finger extension 5/5    bicep 5/5    triceps 5/5    deltoid 5/5    No deficits to light touch or pinprick sensation  Reflexes are 2+ and symmetric  No clonus or Hoffmans sign  No myofacial tenderness to palpation  Normal Gait pattern        DATA and IMAGING:    Nursing/pcp notes, imaging, labs, and vitals reviewed. PT,OT and/or speech notes reviewed    Lab Results   Component Value Date    WBC 5.78 10/16/2015    HGB 12.5 (L) 10/16/2015    HCT 38.7 (L) 10/16/2015    MCV 93.9 10/16/2015     10/16/2015     Lab Results   Component Value Date     10/16/2015    K 5.2 (H) 10/16/2015    CL 99 10/16/2015    CO2 26 10/16/2015    BUN 21 10/16/2015    CREATININE 1.5 (H) 10/16/2015    GLUCOSE 108 10/16/2015    CALCIUM 9.2 10/16/2015    PROT 6.4 (L) 10/14/2015    LABALBU 4.0 10/14/2015    ALKPHOS 65 10/14/2015    AST 18 10/16/2015    ALT 14 10/14/2015    LABGLOM 48 (L) 10/16/2015     Lab Results   Component Value Date    INR 1.01 10/14/2015    INR 1.05 09/09/2013    PROTIME 13.0 10/14/2015    PROTIME 13.2 09/09/2013     Mri Cervical Spine (8/25/2020) [place]  I have personally reviewed these images and my interpretation is:  Straightening of the cervical spine  There is DDD throughout  C2-3 severe bilateral foraminal stenosis  C3-4 severe bilateral foraminal stenosis; mild canal stenosis  C4-5 severe bilateral foraminal stenosis; mild canal stenosis  C5-6 severe bilateral foraminal stenosis; moderate canal stenosis 6-7mm  C6-7 severe left foraminal stenosis; mild canal stenosis         ASSESSMENT:    Al Ribeiro is a 81 y.o. male with complaints of posterior neck pain. ICD-10-CM    1. Cervical stenosis of spinal canal  M48.02 External Referral To Physical Therapy   2. Foraminal stenosis of cervical region  M48.02 External Referral To Physical Therapy   3. DDD (degenerative disc disease), cervical  M50.30 External Referral To Physical Therapy   4. Neck pain  M54.2 External Referral To Physical Therapy       PLAN:  We have discussed and reviewed the results of the MRI cervical spine with Mr. Sanam Naidu at length. We explained that he does have significant narrowing at multiple levels throughout his cervical spine. That being said, he has no radicular pains, he is neurologically intact, no profound weakness. At this point, we are not going to recommend any neurosurgical intervention. We recommend he participate in more non-operative treatments such as PT, massage therapy, medications like muscle relaxers, pain management, etc.    -Start PT [facility])  -Follow up as needed         Note: A total of >50% (23 minutes) of 45 minutes was spent discussing the pathophysiology and treatment and/or coordination of care of the above diagnoses.       CHELSIE Vega

## 2020-10-15 RX ORDER — CLOPIDOGREL BISULFATE 75 MG/1
TABLET ORAL
Qty: 90 TABLET | Refills: 3 | Status: SHIPPED | OUTPATIENT
Start: 2020-10-15 | End: 2021-10-11

## 2020-12-07 RX ORDER — POTASSIUM CHLORIDE 1500 MG/1
TABLET, EXTENDED RELEASE ORAL
Qty: 90 TABLET | Refills: 3 | Status: SHIPPED | OUTPATIENT
Start: 2020-12-07 | End: 2021-11-24

## 2020-12-17 RX ORDER — RAMIPRIL 10 MG/1
10 CAPSULE ORAL DAILY
Qty: 90 CAPSULE | Refills: 3 | Status: SHIPPED | OUTPATIENT
Start: 2020-12-17 | End: 2022-03-02

## 2021-02-09 ENCOUNTER — OFFICE VISIT (OUTPATIENT)
Dept: CARDIOLOGY CLINIC | Age: 86
End: 2021-02-09
Payer: MEDICARE

## 2021-02-09 VITALS
HEART RATE: 66 BPM | SYSTOLIC BLOOD PRESSURE: 134 MMHG | HEIGHT: 67 IN | BODY MASS INDEX: 36.26 KG/M2 | DIASTOLIC BLOOD PRESSURE: 78 MMHG | WEIGHT: 231 LBS

## 2021-02-09 DIAGNOSIS — I10 ESSENTIAL HYPERTENSION: ICD-10-CM

## 2021-02-09 DIAGNOSIS — I25.10 CORONARY ARTERY DISEASE INVOLVING NATIVE CORONARY ARTERY OF NATIVE HEART WITHOUT ANGINA PECTORIS: Primary | ICD-10-CM

## 2021-02-09 DIAGNOSIS — E78.2 MIXED HYPERLIPIDEMIA: ICD-10-CM

## 2021-02-09 PROCEDURE — G8484 FLU IMMUNIZE NO ADMIN: HCPCS | Performed by: CLINICAL NURSE SPECIALIST

## 2021-02-09 PROCEDURE — 1036F TOBACCO NON-USER: CPT | Performed by: CLINICAL NURSE SPECIALIST

## 2021-02-09 PROCEDURE — 99213 OFFICE O/P EST LOW 20 MIN: CPT | Performed by: CLINICAL NURSE SPECIALIST

## 2021-02-09 PROCEDURE — G8427 DOCREV CUR MEDS BY ELIG CLIN: HCPCS | Performed by: CLINICAL NURSE SPECIALIST

## 2021-02-09 PROCEDURE — G8417 CALC BMI ABV UP PARAM F/U: HCPCS | Performed by: CLINICAL NURSE SPECIALIST

## 2021-02-09 PROCEDURE — 4040F PNEUMOC VAC/ADMIN/RCVD: CPT | Performed by: CLINICAL NURSE SPECIALIST

## 2021-02-09 PROCEDURE — 1123F ACP DISCUSS/DSCN MKR DOCD: CPT | Performed by: CLINICAL NURSE SPECIALIST

## 2021-02-09 NOTE — PATIENT INSTRUCTIONS
Follow up in Aug With Dr. Tomasz Garcias   Call with any questions or concerns  Follow up with Veroinca Juarez MD for non cardiac problems  Report any new problems  Cardiovascular Fitness-Exercise as tolerated. Strive for 30 minutes of exercise most days of the week.     Cardiac / Healthy Diet  Continue current medications as directed  Continue plan of treatment  It is always recommended that you bring your medications bottles with you to each visit - this is for your safety

## 2021-02-09 NOTE — PROGRESS NOTES
[facility]  [facility] [address]  [id number]  Phone: [phone]  Fax: [phone]    OFFICE VISIT:  2021    Destinee Luis - : 1935    Reason For Visit:  Kiera Hines is a 81 y.o. male who is here for 6 Month Follow-Up (no cardiac symptoms), Coronary Artery Disease, Hypertension, and Hyperlipidemia  Longstanding history of coronary disease with bypass grafting in . He has had angioplasty and stenting in , 2003 in . Negative Lexiscan for myocardial ischemia 2018    He is here in follow up   Was riding the bike at rehab while his wife was in HD but now it is shut down so is unable to do that. He is having to sit in the car because he is now allowed to go into dialysis. Subjective  Kiera Hines denies exertional chest pain. Has fairly stable MOLINA. No resting, shortness of breath, orthopnea, paroxysmal nocturnal dyspnea, syncope, presyncope, arrhythmia, edema and fatigue. The patient denies numbness or weakness to suggest cerebrovascular accident or transient ischemic attack. Veronica Juarez MD is PCP and follows labs including lipids. Destinee Luis has the following history as [facility] in [facility]:    Patient Active Problem List    Diagnosis Date Noted    Hx of CABG     Diastolic dysfunction     [facility]lobular emphysema ([facility] [address]     LONG TERM ANTICOAGULENT USE     Hypertension     BPH (benign prostatic hyperplasia)     CAD (coronary artery disease)     Hyperlipidemia      Past Medical History:   Diagnosis Date    Benign hypertension     BPH (benign prostatic hyperplasia)     CAD (coronary artery disease)     Bypass Graft---LIMA grafted to the LAD and individual saphenous vein graft placed to a diagonal branch and a saphenous vein Y-graft to the first and second obtuse marginal branches.     Cancer ([facility] [address]     COPD (chronic obstructive pulmonary disease) (HCC)     Fracture of T12 vertebra (HCC)     Hyperglycemia     Hyperlipidemia  Hypertension     LONG TERM ANTICOAGULENT USE      Past Surgical History:   Procedure Laterality Date    CARDIAC CATHETERIZATION  4/25/04, 5/25/04    LEFT Heart Cath, see scanned report   [address]  2/8/01, 6/9/03    LEFT Heart Cath, see scanned report    CARDIAC CATHETERIZATION  8/5/15  [facility]    balloon angioplasty of in-stent stenosis of RCA    CARDIAC CATHETERIZATION  10/15/15  [facility]    stent to RCA. EF 55%    CARDIOVASCULAR STRESS TEST  04/24/08, [facility]    Stress Echo    CARPAL TUNNEL RELEASE  07/28/05, [facility]ardo    LEFT anterior subcutaneous ulnar nerve transpostion and release of Guyon's canal carpal tunnel release.  CORONARY ARTERY BYPASS GRAFT  07/2007    x 4    ELBOW SURGERY      Left     Family History   Problem Relation Age of Onset    Coronary Art Dis Mother     Hypertension Mother     Cancer Father     Cancer Sister     Pacemaker Brother     Dementia Brother     Stroke Sister      Social History     Tobacco Use    Smoking status: Former Smoker     Types: Cigarettes    Smokeless tobacco: Never Used   Substance Use Topics    Alcohol use: No      Current Outpatient Medications   Medication Sig Dispense Refill    ramipril (ALTACE) 10 MG capsule Take 1 capsule by mouth daily 90 capsule 3    KLOR-CON M20 20 MEQ extended release tablet TAKE 1 TABLET BY MOUTH EVERY DAY 90 tablet 3    clopidogrel (PLAVIX) 75 MG tablet TAKE 1 TABLET BY MOUTH EVERY DAY 90 tablet 3    oxyCODONE-acetaminophen (PERCOCET) 5-325 MG per tablet Take 1 tablet by mouth every 6 hours as needed for Pain.  bumetanide (BUMEX) 1 MG tablet TAKE 1 TABLET BY MOUTH EVERY DAY 90 tablet 3    triamcinolone (KENALOG) 0.1 % ointment Apply topically 2 times daily Apply topically 2 times daily.       Misc Natural Products (DEEP SLEEP PO) Take by mouth daily      potassium chloride (KLOR-CON) 20 MEQ packet Take 20 mEq by mouth daily      simvastatin (ZOCOR) 40 MG tablet TAKE 1 TABLET BY MOUTH EVERY DAY AT NIGHT 90 tablet 3    traMADol (ULTRAM) 50 MG tablet Take 50 mg by mouth every 6 hours as needed for Pain.  glipiZIDE (GLUCOTROL XL) 2.5 MG extended release tablet Take 2.5 mg by mouth daily      Cholecalciferol (VITAMIN D3) 2000 units CAPS Take 2,000 Units by mouth 2 times daily      carvedilol (COREG) 12.5 MG tablet Take 12.5 mg by mouth 2 times daily      levothyroxine (SYNTHROID) 25 MCG tablet Take 25 mcg by mouth Daily      cetirizine (ZYRTEC) 10 MG tablet Take 10 mg by mouth daily      mometasone (NASONEX) 50 MCG/ACT nasal spray 2 sprays daily       theophylline CR, 12 hour, (THEODUR) 300 MG CR tablet Take 300 mg by mouth every 8 hours 1/2 tablet tid       albuterol (PROVENTIL HFA;VENTOLIN HFA) 108 (90 BASE) MCG/ACT inhaler Inhale 2 puffs into the lungs every 4 hours as needed for Wheezing      Multiple Vitamins-Minerals (CENTRUM SILVER) TABS Take 1 tablet by mouth daily.  aspirin 81 MG tablet Take 81 mg by mouth daily.  omeprazole (PRILOSEC) 20 MG capsule Take 20 mg by mouth daily.  montelukast (SINGULAIR) 10 MG tablet Take 10 mg by mouth nightly. No current facility-administered medications for this visit. Allergies: Adhesive tape and Wound dressing adhesive    Review of Systems  Constitutional - no significant activity change, appetite change, or unexpected weight change. No fever, chills or diaphoresis. No fatigue. HEENT - no significant rhinorrhea or epistaxis. No tinnitus or significant hearing loss. Eyes - no sudden vision change or amaurosis. Respiratory - no significant wheezing, stridor, apnea or cough. No dyspnea on exertion or shortness of breath. Cardiovascular - no exertional chest pain, orthopnea or PND. No sensation of arrhythmia or slow heart rate. No claudication or leg edema. Gastrointestinal - no abdominal swelling or pain. No blood in stool. No severe constipation, diarrhea, nausea, or vomiting.    Genitourinary - no difficulty urinating, dysuria, frequency, or urgency. No flank pain or hematuria. Musculoskeletal - no back pain, gait disturbance, or myalgia. Skin - no color change or rash. No pallor. No new surgical incision. Neurologic - no speech difficulty, facial asymmetry or lateralizing weakness. No seizures, presyncope, syncope, or significant dizziness. Hematologic - no easy bruising or excessive bleeding. Psychiatric - no severe anxiety or insomnia. No confusion. All other review of systems are negative. Objective  Vital Signs - /78   Pulse 66   Ht 5' 7\" (1.702 m)   Wt 231 lb (104.8 kg)   BMI 36.18 kg/m²   General - Ron Titus is alert, cooperative, and pleasant. Well groomed. No acute distress. Body habitus is overweight. HEENT - The head is normocephalic. No circumoral cyanosis. Dentition is normal.   EYES -  No Xanthelasma, no arcus senilis, no conjunctival hemorrhages or discharge. Neck - Supple, without increased jugular venous pressures. No carotid bruits. No mass. Respiratory - Lungs are clear bilaterally. No wheezes or rales. Normal effort without use of accessory muscles. Cardiovascular - Heart has regular rhythm and rate. No murmurs, rubs or gallops. + pedal pulses and no varicosities. Abdominal -  Soft, nontender, nondistended. Bowel sounds are intact. Extremities - No clubbing, cyanosis, or  edema. Musculoskeletal -  No clubbing . No Osler's nodes. Gait normal .  No kyphosis or scoliosis. Skin -  no statis ulcers or dermatitis. Neurological - No focal signs are identified. Oriented to person, place and time. Psychiatric -  Appropriate affect and mood. Assessment:     Diagnosis Orders   1. Coronary artery disease involving native coronary artery of native heart without angina pectoris     2. Essential hypertension     3.  Mixed hyperlipidemia       Data:  BP Readings from Last 3 Encounters:   02/09/21 134/78   10/08/20 (!) 141/68   08/04/20 128/64    Pulse Readings from Last 3 Encounters:   02/09/21 66   10/08/20 67   08/04/20 68        Wt Readings from Last 3 Encounters:   02/09/21 231 lb (104.8 kg)   10/08/20 210 lb (95.3 kg)   08/04/20 224 lb (101.6 kg)     Blood pressure and heart rate controlled. Medical management includes beta-blocker, ACE inhibitor DAPT with aspirin Plavix and statin  PCP managing labs including lipids and glucose    Unfortunately there sleep schedule is a little erratic due to his wife having to be a dialysis at 5 AM 3 days a week. He unfortunately is able to sit in the car. Suggested he try to get a later time for his wife's dialysis so he is able to do other errands and not sit in a cold car   States taking medications as prescribed  Stable cardiovascular status. No evidence of overt heart failure, angina or dysrhythmia. Plan    Follow up in Aug With Dr. Deuce Medellin   Call with any questions or concerns  Follow up with Brii Rohca MD for non cardiac problems and labs  Report any new problems  Cardiovascular Fitness-Exercise as tolerated. Strive for 30 minutes of exercise most days of the week. Cardiac / Healthy Diet  Continue current medications as directed  Continue plan of treatment  It is always recommended that you bring your medications bottles with you to each visit - this is for your safety! CHELSIE Danielle dragon/transcription disclaimer: Much of this encounter note is electronic transcription/translation of spoken language to printed tach. Electronic translation of spoken language may be erroneous, or at times, nonsensical words or phrases may be inadvertently transcribed.  Although, I have reviewed the note for such errors, some may still exist.

## 2021-05-22 DIAGNOSIS — E78.2 MIXED HYPERLIPIDEMIA: ICD-10-CM

## 2021-05-24 RX ORDER — SIMVASTATIN 40 MG
TABLET ORAL
Qty: 90 TABLET | Refills: 0 | Status: SHIPPED | OUTPATIENT
Start: 2021-05-24 | End: 2021-09-14 | Stop reason: SDUPTHER

## 2021-08-10 ENCOUNTER — OFFICE VISIT (OUTPATIENT)
Dept: CARDIOLOGY CLINIC | Age: 86
End: 2021-08-10
Payer: MEDICARE

## 2021-08-10 VITALS
BODY MASS INDEX: 33.49 KG/M2 | SYSTOLIC BLOOD PRESSURE: 132 MMHG | HEIGHT: 68 IN | WEIGHT: 221 LBS | DIASTOLIC BLOOD PRESSURE: 60 MMHG | HEART RATE: 78 BPM

## 2021-08-10 DIAGNOSIS — R07.9 CHEST PAIN IN ADULT: ICD-10-CM

## 2021-08-10 DIAGNOSIS — I25.118 CORONARY ARTERY DISEASE INVOLVING NATIVE CORONARY ARTERY OF NATIVE HEART WITH OTHER FORM OF ANGINA PECTORIS (HCC): ICD-10-CM

## 2021-08-10 DIAGNOSIS — I25.10 CORONARY ARTERY DISEASE INVOLVING NATIVE CORONARY ARTERY OF NATIVE HEART WITHOUT ANGINA PECTORIS: ICD-10-CM

## 2021-08-10 DIAGNOSIS — I10 ESSENTIAL HYPERTENSION: Primary | ICD-10-CM

## 2021-08-10 DIAGNOSIS — Z95.1 HX OF CABG: ICD-10-CM

## 2021-08-10 PROCEDURE — G8417 CALC BMI ABV UP PARAM F/U: HCPCS | Performed by: INTERNAL MEDICINE

## 2021-08-10 PROCEDURE — 93000 ELECTROCARDIOGRAM COMPLETE: CPT | Performed by: INTERNAL MEDICINE

## 2021-08-10 PROCEDURE — 4040F PNEUMOC VAC/ADMIN/RCVD: CPT | Performed by: INTERNAL MEDICINE

## 2021-08-10 PROCEDURE — 1123F ACP DISCUSS/DSCN MKR DOCD: CPT | Performed by: INTERNAL MEDICINE

## 2021-08-10 PROCEDURE — 99214 OFFICE O/P EST MOD 30 MIN: CPT | Performed by: INTERNAL MEDICINE

## 2021-08-10 PROCEDURE — 1036F TOBACCO NON-USER: CPT | Performed by: INTERNAL MEDICINE

## 2021-08-10 PROCEDURE — G8427 DOCREV CUR MEDS BY ELIG CLIN: HCPCS | Performed by: INTERNAL MEDICINE

## 2021-08-10 RX ORDER — CIPROFLOXACIN 500 MG/1
500 TABLET, FILM COATED ORAL 2 TIMES DAILY
COMMUNITY
End: 2022-10-12

## 2021-08-10 NOTE — PROGRESS NOTES
HISTORY  73-year-old gentleman with history of combined dyslipidemia, prior tobacco abuse/COPD, hypertension, and coronary disease returns for follow-up. He was grafted in 1996 had 2 stents in 03 and a year or 2 later and ultimately a balloon angioplasty in 2015. His last assessment consisted of a Lexiscan dual-isotope in April 2018 which revealed no ischemia. On return today he tells me he has had a few episodes of recurrent chest discomfort though is not sure whether it is due to his arthritis or his heart. He in addition has some chronic dyspnea on exertion which is relatively unchanged. He has been vaccinated for COVID-19. PHYSICAL EXAM  On exam he carries 221 pounds on a 5 foot 8 inch frame. Pressure is 132/60 with pulse of 78. Very pleasant endomorphic elderly gentleman. EOMs full, sclerae and conjunctiva normal. PERRLA. Mask in place. Trachea midline with no neck masses. Assessment of internal jugular veins reveals no elevation of central venous pressure at 45 degrees. Carotid pulses normal without delay or bruit. Thyroid normal to palpation. Healed midline sternotomy scar. Chest exam reveals normal respiratory effort with mild prolongation of his expiratory phase. No skin lesions seen. PMI normal. S1, S2 normal without murmur or florencia or click. Obese protuberant abdomen. Normal bowel sounds without palpable mass or bruit. No clubbing or acrocyanosis. No significant lower extremity edema or signs of venous insufficiency. General motor strength appears to be within normal limits. Normal range of motion with normal gait. Alert, oriented x 3, memory and cognition normal as reflected by history and conversation. EKG reveals a sinus rhythm with an intraventricular conduction defect [ ms]. ASSESSMENT/PLAN:   1. Coronary disease -3 and half years since last assessed with some episodic chest discomfort. We will repeat a Lexiscan dual-isotope. Continue Plavix  2.   Hypertension -adequate

## 2021-08-10 NOTE — PATIENT INSTRUCTIONS
[place] at the [facility] and [address] [facility] located on the first floor of [place] [address] through hospital main entrance and turn immediately to your left. Patient's contact number:  [phone] (home)      Lexiscan Stress Test      Lexiscan (regadenoson injection) is a prescription drug given through an IV line that increases blood flow through the arteries of the heart during a cardiac nuclear stress test.     There are two parts to a Lexiscan stress test: the rest portion and the exercise portion. For the rest portion, a radioactive tracer is injected into your arm through the IV. After 30 to 60 minutes, the process of imaging will begin. A nuclear camera will be placed on your chest area and images are taken for the next 15 to 20 minutes. For the exercise portion, a nurse will attach EKG electrodes to your chest to monitor your heart rate. The drug [facility] is administered to simulate stress on the heart. Your heart rhythm will then be monitored for the next few minutes. Your blood pressure will also be monitored throughout the exercise portion. [place] through the exercise portion, a second round of radioactive tracer is injected into your body. Your heart rate and EKG will be monitored for another few minutes after administering the drug. Test Preparation:     Bring a list of your current medications. Do not take any of your medications the morning of the test, but bring all morning medications with you as you will take them after the stress portion of the test is completed.  Do not eat Bananas 24 hours prior to test.     No caffeine 24 hours prior to the testing. This includes: coffee, pop/soda, chocolate, cold medications, etc.  Any product that might contain caffeine.  No nicotine or alcohol 12 hours prior to your test.    Nothing to eat or drink 6-8 hours prior to appointment time.   It is okay to drink small amounts of water during the four hours prior to the test.   Nitroglycerin patches must be taken off 1 hour before testing.  Wear comfortable clothing.  Please refrain from any strenuous exercise or activities the day before your test, or the day of your test.   The Nuclear Lexiscan Stress test takes about 2 ½ to 3 hours to complete. If for any reason you are unable to keep this appointment, please contact Outpatient Scheduling, [phone], as soon as possible to reschedule.

## 2021-08-18 DIAGNOSIS — E78.2 MIXED HYPERLIPIDEMIA: ICD-10-CM

## 2021-08-18 RX ORDER — SIMVASTATIN 40 MG
TABLET ORAL
Qty: 90 TABLET | Refills: 0 | OUTPATIENT
Start: 2021-08-18

## 2021-09-03 DIAGNOSIS — E78.2 MIXED HYPERLIPIDEMIA: ICD-10-CM

## 2021-09-03 RX ORDER — SIMVASTATIN 40 MG
TABLET ORAL
Qty: 90 TABLET | Refills: 0 | OUTPATIENT
Start: 2021-09-03

## 2021-09-09 ENCOUNTER — HOSPITAL ENCOUNTER (OUTPATIENT)
Dept: NUCLEAR MEDICINE | Age: 86
Discharge: HOME | End: 2021-09-11
Payer: MEDICARE

## 2021-09-09 DIAGNOSIS — I25.118 CORONARY ARTERY DISEASE INVOLVING NATIVE CORONARY ARTERY OF NATIVE HEART WITH OTHER FORM OF ANGINA PECTORIS (HCC): ICD-10-CM

## 2021-09-09 DIAGNOSIS — R07.9 CHEST PAIN IN ADULT: ICD-10-CM

## 2021-09-09 DIAGNOSIS — Z95.1 HX OF CABG: ICD-10-CM

## 2021-09-09 PROCEDURE — 3430000000 HC RX DIAGNOSTIC RADIOPHARMACEUTICAL: Performed by: INTERNAL MEDICINE

## 2021-09-09 PROCEDURE — 78452 HT MUSCLE IMAGE SPECT MULT: CPT

## 2021-09-09 PROCEDURE — A9500 TC99M SESTAMIBI: HCPCS | Performed by: INTERNAL MEDICINE

## 2021-09-09 PROCEDURE — 6360000002 HC RX W HCPCS: Performed by: INTERNAL MEDICINE

## 2021-09-09 RX ADMIN — REGADENOSON 0.4 MG: 0.08 INJECTION, SOLUTION INTRAVENOUS at 10:34

## 2021-09-09 RX ADMIN — TETRAKIS(2-METHOXYISOBUTYLISOCYANIDE)COPPER(I) TETRAFLUOROBORATE 30 MILLICURIE: 1 INJECTION, POWDER, LYOPHILIZED, FOR SOLUTION INTRAVENOUS at 11:49

## 2021-09-09 RX ADMIN — TETRAKIS(2-METHOXYISOBUTYLISOCYANIDE)COPPER(I) TETRAFLUOROBORATE 10 MILLICURIE: 1 INJECTION, POWDER, LYOPHILIZED, FOR SOLUTION INTRAVENOUS at 11:49

## 2021-09-11 DIAGNOSIS — E78.2 MIXED HYPERLIPIDEMIA: ICD-10-CM

## 2021-09-13 RX ORDER — SIMVASTATIN 40 MG
TABLET ORAL
Qty: 90 TABLET | Refills: 0 | OUTPATIENT
Start: 2021-09-13

## 2021-09-14 DIAGNOSIS — E78.2 MIXED HYPERLIPIDEMIA: ICD-10-CM

## 2021-09-14 RX ORDER — SIMVASTATIN 40 MG
TABLET ORAL
Qty: 90 TABLET | Refills: 3 | Status: SHIPPED | OUTPATIENT
Start: 2021-09-14 | End: 2022-09-19 | Stop reason: SDUPTHER

## 2021-09-16 ENCOUNTER — OFFICE VISIT (OUTPATIENT)
Dept: CARDIOLOGY CLINIC | Age: 86
End: 2021-09-16
Payer: MEDICARE

## 2021-09-16 VITALS
DIASTOLIC BLOOD PRESSURE: 80 MMHG | HEART RATE: 74 BPM | HEIGHT: 68 IN | BODY MASS INDEX: 33.49 KG/M2 | OXYGEN SATURATION: 96 % | SYSTOLIC BLOOD PRESSURE: 140 MMHG | WEIGHT: 221 LBS

## 2021-09-16 DIAGNOSIS — I10 ESSENTIAL HYPERTENSION: ICD-10-CM

## 2021-09-16 DIAGNOSIS — Z95.1 HX OF CABG: ICD-10-CM

## 2021-09-16 DIAGNOSIS — I25.10 CORONARY ARTERY DISEASE INVOLVING NATIVE HEART, UNSPECIFIED VESSEL OR LESION TYPE, UNSPECIFIED WHETHER ANGINA PRESENT: Primary | ICD-10-CM

## 2021-09-16 DIAGNOSIS — E78.2 MIXED HYPERLIPIDEMIA: ICD-10-CM

## 2021-09-16 PROCEDURE — 4040F PNEUMOC VAC/ADMIN/RCVD: CPT | Performed by: NURSE PRACTITIONER

## 2021-09-16 PROCEDURE — 1123F ACP DISCUSS/DSCN MKR DOCD: CPT | Performed by: NURSE PRACTITIONER

## 2021-09-16 PROCEDURE — G8417 CALC BMI ABV UP PARAM F/U: HCPCS | Performed by: NURSE PRACTITIONER

## 2021-09-16 PROCEDURE — 99214 OFFICE O/P EST MOD 30 MIN: CPT | Performed by: NURSE PRACTITIONER

## 2021-09-16 PROCEDURE — 1036F TOBACCO NON-USER: CPT | Performed by: NURSE PRACTITIONER

## 2021-09-16 PROCEDURE — G8427 DOCREV CUR MEDS BY ELIG CLIN: HCPCS | Performed by: NURSE PRACTITIONER

## 2021-09-16 RX ORDER — ISOSORBIDE MONONITRATE 30 MG/1
30 TABLET, EXTENDED RELEASE ORAL NIGHTLY
Qty: 30 TABLET | Refills: 1 | Status: SHIPPED | OUTPATIENT
Start: 2021-09-16 | End: 2021-10-08

## 2021-09-16 RX ORDER — BLOOD SUGAR DIAGNOSTIC
STRIP MISCELLANEOUS
COMMUNITY
Start: 2021-09-08

## 2021-09-16 NOTE — PROGRESS NOTES
[facility], [place]. [address] [facility] [address] [facility]  [phone] office  [phone] fax      OFFICE VISIT:  2021    Jasson Wang - : 1935  Reason For Visit:  Tatianna Orr is a 81 y.o. male who is here for Follow-up (lexiscan results)    History:   Diagnosis Orders   1. Coronary artery disease involving native heart, unspecified vessel or lesion type, unspecified whether angina present     2. Hx of CABG     3. Essential hypertension     4. Mixed hyperlipidemia       The patient presents today for cardiology follow up after having Lexiscan ordered by Dr. Jeet lCark. Mr. Aaron Rao is recovering from pneumonia diagnosed 3 weeks ago. He reports that his chest feels a little tight from time to time recently. He has been taking more NTG sl over the past month. BP is well controlled on current regimen. The patient's PCP monitors cholesterol. Tight feeling in chest. Ulnar nerver surgery in past.    Subjective  Tatianna Orr denies orthopnea, paroxysmal nocturnal dyspnea, syncope, presyncope, sensed arrhythmia, edema and fatigue. The patient denies numbness or weakness to suggest cerebrovascular accident or transient ischemic attack. + chest tightness over past month with hx of pneumonia. +stable MOLINA.     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  Cancer ([facility] [address]     COPD (chronic obstructive pulmonary disease) ([facility] [address]     Fracture of T12 vertebra (HCC)     Hyperglycemia     Hyperlipidemia     Hypertension     LONG TERM ANTICOAGULENT USE      Past Surgical History:   Procedure Laterality Date    CARDIAC CATHETERIZATION  4/25/04, 5/25/04    LEFT Heart Cath, see scanned report    CARDIAC CATHETERIZATION  2/8/01, 6/9/03    LEFT Heart Cath, see scanned report    CARDIAC CATHETERIZATION  8/5/15  [facility]    balloon angioplasty of in-stent stenosis of RCA    CARDIAC CATHETERIZATION  10/15/15  [facility]    stent to RCA. EF 55%    CARDIOVASCULAR STRESS TEST  04/24/08, [facility]    Stress Echo    CARPAL TUNNEL RELEASE  07/28/05, [facility]    LEFT anterior subcutaneous ulnar nerve transpostion and release of Guyon's canal carpal tunnel release.     CORONARY ARTERY BYPASS GRAFT  07/2007    x 4    ELBOW SURGERY      Left     Family History   Problem Relation Age of Onset    Coronary Art Dis Mother     Hypertension Mother     Cancer Father     Cancer Sister     Pacemaker Brother     Dementia Brother     Stroke Sister      Social History     Tobacco Use    Smoking status: Former Smoker     Types: Cigarettes    Smokeless tobacco: Never Used   Substance Use Topics    Alcohol use: No      Current Outpatient Medications   Medication Sig Dispense Refill    OXYGEN Inhale into the lungs 2L currently      simvastatin (ZOCOR) 40 MG tablet TAKE 1 TABLET BY MOUTH EVERY DAY AT NIGHT 90 tablet 3    ciprofloxacin (CIPRO) 500 MG tablet Take 500 mg by mouth 2 times daily      ramipril (ALTACE) 10 MG capsule Take 1 capsule by mouth daily 90 capsule 3    KLOR-CON M20 20 MEQ extended release tablet TAKE 1 TABLET BY MOUTH EVERY DAY 90 tablet 3    clopidogrel (PLAVIX) 75 MG tablet TAKE 1 TABLET BY MOUTH EVERY DAY 90 tablet 3    oxyCODONE-acetaminophen (PERCOCET) 5-325 MG per tablet Take 1 tablet by mouth every 6 hours as needed for Pain.      bumetanide (BUMEX) 1 MG tablet TAKE 1 TABLET BY MOUTH EVERY DAY 90 tablet 3    triamcinolone (KENALOG) 0.1 % ointment Apply topically 2 times daily Apply topically 2 times daily.  Misc Natural Products (DEEP SLEEP PO) Take by mouth daily      potassium chloride (KLOR-CON) 20 MEQ packet Take 20 mEq by mouth daily      traMADol (ULTRAM) 50 MG tablet Take 50 mg by mouth every 6 hours as needed for Pain.  glipiZIDE (GLUCOTROL XL) 2.5 MG extended release tablet Take 2.5 mg by mouth daily      Cholecalciferol (VITAMIN D3) 2000 units CAPS Take 2,000 Units by mouth 2 times daily      carvedilol (COREG) 12.5 MG tablet Take 12.5 mg by mouth 2 times daily      levothyroxine (SYNTHROID) 25 MCG tablet Take 25 mcg by mouth Daily      cetirizine (ZYRTEC) 10 MG tablet Take 10 mg by mouth daily      mometasone (NASONEX) 50 MCG/ACT nasal spray 2 sprays daily       theophylline CR, 12 hour, (THEODUR) 300 MG CR tablet Take 300 mg by mouth every 8 hours 1/2 tablet tid       albuterol (PROVENTIL HFA;VENTOLIN HFA) 108 (90 BASE) MCG/ACT inhaler Inhale 2 puffs into the lungs every 4 hours as needed for Wheezing      Multiple Vitamins-Minerals (CENTRUM SILVER) TABS Take 1 tablet by mouth daily.  aspirin 81 MG tablet Take 81 mg by mouth daily.  omeprazole (PRILOSEC) 20 MG capsule Take 20 mg by mouth daily.  montelukast (SINGULAIR) 10 MG tablet Take 10 mg by mouth nightly.  ACCU-CHEK CATARINO PLUS strip        No current facility-administered medications for this visit. Allergies: Adhesive tape and Wound dressing adhesive    Review of Systems  Constitutional - no appetite change, or unexpected weight change. No fever, chills or diaphoresis. No significant change in activity level or new onset of fatigue. HEENT - no significant rhinorrhea or epistaxis. No tinnitus or significant hearing loss. Eyes - no sudden vision change or amaurosis.  No corneal arcus, xantholasma, subconjunctival hemorrhage or discharge. Respiratory - no significant wheezing, stridor, apnea or cough. + stable MOLINA. Diagnosis pneumonia 3 weeks ago. Cardiovascular - no orthopnea or PND. No sensation of sustained arrythmia. No occurrence of slow heart rate. No palpitations. No claudication. + intermittent chest tightness more so over past month. Relief on occasion with NTG sl. Gastrointestinal - no abdominal swelling or pain. No blood in stool. No severe constipation, diarrhea, nausea, or vomiting. Genitourinary - no dysuria, frequency, or urgency. No flank pain or hematuria. Musculoskeletal - no back pain or myalgia. No problems with gait. Extremities - no clubbing, cyanosis or extremity edema. Skin - no color change or rash. No pallor. No new surgical incision. Neurologic - no speech difficulty, facial asymmetry or lateralizing weakness. No seizures, presyncope or syncope. No significant dizziness. Hematologic - no easy bruising or excessive bleeding. Psychiatric - no severe anxiety or insomnia. No confusion. All other review of systems are negative. Objective  Vital Signs - BP (!) 142/70   Pulse 74   Ht 5' 8\" (1.727 m)   Wt 221 lb (100.2 kg)   SpO2 96%   BMI 33.60 kg/m²   General - Karron Ree is alert, cooperative, and pleasant. Well groomed. No acute distress. Body habitus - Body mass index is 33.6 kg/m². HEENT - Head is normocephalic. No circumoral cyanosis. Dentition is normal.  EYES -   Lids normal without ptosis. No discharge, edema or subconjunctival hemorrhage. Neck - Symmetrical without apparent mass or lymphadenopathy. Respiratory - Normal respiratory effort without use of accessory muscles. Ausculatation reveals vesicular breath sounds without crackles, wheezes, rub or rhonchi. Cardiovascular - No jugular venous distention. Auscultation reveals regular rate and rhythm. No audible clicks, gallop or rub. No murmur. No lower extremity varicosities.   No carotid bruits. Abdominal -  No visible distention, mass or pulsations. Extremities - No clubbing or cyanosis. No statis dermatitis or ulcers. No edema. Musculoskeletal -   No Osler's nodes. No kyphosis or scoliosis. Gait is even and regular without limp or shuffle. Ambulates without assistance. Skin -  Warm and dry; no rash or pallor. No new surgical wound. Neurological - No focal neurological deficits. Thought processes coherent. No apparent tremor. Oriented to person, place and time. Psychiatric -  Appropriate affect and mood. Data reviewed:  9/9/21 Lexiscan  Impression:   There is small area of apical anteroseptal infarct/ischemia versus   artifact. Suggest: Similar area of defect noted on Lexiscan study from   10/20/2018 with normal EF. Clinical correlation is advised. Signed by Dr Luis A Reed     4/10/18 echo  Mitral valve leaflets are mildly thickened with preserved leaflet  mobility. Trace mitral regurgitation. Mildly thickened aortic valve leaflets with preserved leaflet mobility. Mildly dilated left atrium. Normal left ventricular size and function. Mild concentric left ventricular hypertrophy. E/A flow reversal noted. Suggestive of diastolic dysfunction.     Signature   Electronically signed by Drew Cagle MD(Interpreting   physician) on 04/10/2018 02:53 PM    Lab Results   Component Value Date    WBC 5.78 10/16/2015    HGB 12.5 (L) 10/16/2015    HCT 38.7 (L) 10/16/2015    MCV 93.9 10/16/2015     10/16/2015     Lab Results   Component Value Date     10/16/2015    K 5.2 (H) 10/16/2015    CL 99 10/16/2015    CO2 26 10/16/2015    BUN 21 10/16/2015    CREATININE 1.5 (H) 10/16/2015    GLUCOSE 108 10/16/2015    CALCIUM 9.2 10/16/2015    PROT 6.4 (L) 10/14/2015    LABALBU 4.0 10/14/2015    ALKPHOS 65 10/14/2015    AST 18 10/16/2015    ALT 14 10/14/2015    LABGLOM 48 (L) 10/16/2015       Lab Results   Component Value Date    CHOL 110 (L) 10/15/2015    CHOL 93 (L) 08/05/2015    CHOL 101 06/12/2012     Lab Results   Component Value Date    TRIG 202 (H) 10/15/2015    TRIG 121 (L) 08/05/2015    TRIG 113 06/12/2012     Lab Results   Component Value Date    HDL 24 (L) 10/15/2015    HDL 26 (L) 08/05/2015    HDL 30 06/12/2012     Lab Results   Component Value Date    LDLCHOLESTEROL 54 12/08/2010    LDLCALC 43 06/09/2011     BP Readings from Last 3 Encounters:   09/16/21 (!) 142/70   08/10/21 132/60   02/09/21 134/78    Pulse Readings from Last 3 Encounters:   09/16/21 74   08/10/21 78   02/09/21 66        Wt Readings from Last 3 Encounters:   09/16/21 221 lb (100.2 kg)   08/10/21 221 lb (100.2 kg)   02/09/21 231 lb (104.8 kg)     Assessment/Plan:   Diagnosis Orders   1. Coronary artery disease involving native heart, unspecified vessel or lesion type, unspecified whether angina present     2. Hx of CABG     3. Essential hypertension     4. Mixed hyperlipidemia       CAD s/p CABG - recent Lexiscan stable in comparison to 2018 nuclear stress test.  Due to report of more chest tightness over past month, discussed option of proceeding with cath. Patient is recovering from pneumonia and the recent increase in chest tightness could be secondary to that. Patient does not want to have a cath at this time. Will add Imdur 30 mg (1) at bedtime and reassess in 2 weeks. HTN - normotensive on current regimen. Continue same. Hyperlipidemia - LDL 54. Continue Zocor 40 mg daily. Patient is compliant with medication regimen. Previous cardiac history and records reviewed. Continue current medical management for cardiac related condition. Continue other current medications as directed. Continue to follow up with primary care provider for non cardiac medical problems. If your primary care provider is outside of the [facility], please request that your labs be faxed to this office at [phone]. BP goal 130/80 or less.   Call the office with any problems, questions or

## 2021-09-16 NOTE — PATIENT INSTRUCTIONS
do lots of things to keep your heart healthy. It may not be easy, but you can change your diet, exercise more, and quit smoking. These steps really work to lower your chance of heart disease. Follow-up care is a key part of your treatment and safety. Be sure to make and go to all appointments, and call your doctor if you are having problems. It's also a good idea to know your test results and keep a list of the medicines you take. How can you care for yourself at home? Diet  · Use less salt when you cook and eat. This helps lower your blood pressure. Taste food before salting. Add only a little salt when you think you need it. With time, your taste buds will adjust to less salt. · Eat fewer snack items, fast foods, canned soups, and other high-salt, high-fat, processed foods. · Read food labels and try to avoid saturated and trans fats. They increase your risk of heart disease by raising cholesterol levels. · Limit the amount of solid fat-butter, margarine, and shortening-you eat. Use olive, peanut, or canola oil when you cook. Bake, broil, and steam foods instead of frying them. · Eat a variety of fruit and vegetables every day. Dark green, deep orange, red, or yellow fruits and vegetables are especially good for you. Examples include spinach, carrots, peaches, and berries. · Foods high in fiber can reduce your cholesterol and provide important vitamins and minerals. High-fiber foods include whole-grain cereals and breads, oatmeal, beans, brown rice, citrus fruits, and apples. · Eat lean proteins. Heart-healthy proteins include seafood, lean meats and poultry, eggs, beans, peas, nuts, seeds, and soy products. · Limit drinks and foods with added sugar. These include candy, desserts, and soda pop. Lifestyle changes  · If your doctor recommends it, get more exercise. Walking is a good choice. Bit by bit, increase the amount you walk every day. Try for at least 30 minutes on most days of the week.  You also may want to swim, bike, or do other activities. · Do not smoke. If you need help quitting, talk to your doctor about stop-smoking programs and medicines. These can increase your chances of quitting for good. Quitting smoking may be the most important step you can take to protect your heart. It is never too late to quit. · Limit alcohol to 2 drinks a day for men and 1 drink a day for women. Too much alcohol can cause health problems. · Manage other health problems such as diabetes, high blood pressure, and high cholesterol. If you think you may have a problem with alcohol or drug use, talk to your doctor. Medicines  · Take your medicines exactly as prescribed. Call your doctor if you think you are having a problem with your medicine. · If your doctor recommends aspirin, take the amount directed each day. Make sure you take aspirin and not another kind of pain reliever, such as acetaminophen (Tylenol). When should you call for help? [id number] if you have symptoms of a heart attack. These may include:  · Chest pain or pressure, or a strange feeling in the chest.  · Sweating. · Shortness of breath. · Pain, pressure, or a strange feeling in the back, neck, jaw, or upper belly or in one or both shoulders or arms. · Lightheadedness or sudden weakness. · A fast or irregular heartbeat. After you call 911, the  may tell you to chew 1 adult-strength or 2 to 4 low-dose aspirin. Wait for an ambulance. Do not try to drive yourself. Watch closely for changes in your health, and be sure to contact your doctor if you have any problems. Where can you learn more? Go to https://Xi3carlineXunlei.Sing Ting Delicious. org and sign in to your [employer] account. Enter H045 in the [employer] box to learn more about \"A Healthy Heart: Care Instructions. \"     If you do not have an account, please click on the \"Sign Up Now\" link.   Current as of: December 16, 2019               Content Version: 12.5  © 7595-5535 Healthwise, Incorporated. Care instructions adapted under license by [facility] ([facility]). If you have questions about a medical condition or this instruction, always ask your healthcare professional. [address] any warranty or liability for your use of this information.

## 2021-09-30 ENCOUNTER — VIRTUAL VISIT (OUTPATIENT)
Dept: CARDIOLOGY CLINIC | Age: 86
End: 2021-09-30
Payer: MEDICARE

## 2021-09-30 DIAGNOSIS — I10 ESSENTIAL HYPERTENSION: ICD-10-CM

## 2021-09-30 DIAGNOSIS — E78.2 MIXED HYPERLIPIDEMIA: ICD-10-CM

## 2021-09-30 DIAGNOSIS — I51.89 DIASTOLIC DYSFUNCTION: ICD-10-CM

## 2021-09-30 DIAGNOSIS — Z95.1 HX OF CABG: ICD-10-CM

## 2021-09-30 DIAGNOSIS — I25.10 CORONARY ARTERY DISEASE INVOLVING NATIVE CORONARY ARTERY OF NATIVE HEART WITHOUT ANGINA PECTORIS: Primary | ICD-10-CM

## 2021-09-30 PROCEDURE — 99441 PR PHYS/QHP TELEPHONE EVALUATION 5-10 MIN: CPT | Performed by: NURSE PRACTITIONER

## 2021-09-30 NOTE — PATIENT INSTRUCTIONS
New instructions for today:  How to take:  NITROGLYCERIN (Nitrostat) 0.4 mg tablets, sublingual.  Nitroglycerin is in a group of drugs called nitrates. Nitroglycerin dilates (widens) blood vessels, making it easier for blood to flow through them and easier for the heart to pump. Dosing Guidelines for Nitroglycerin Tablets  · At the start of an angina (chest pain) attack, place one tablet under the tongue or between the cheek and gum. Do not swallow or chew the tablet; let it dissolve on its own. If necessary, a second and third tablet may be used, with five minutes between using each tablet. If you use a third tablet and your chest pain continues, it is time to seek immediate medical attention. Call 911 immediately and have someone drive you to the emergency room. You may be having a heart attack or other serious heart problem. · To prevent angina from exercise or stress, use 1 tablet 5 to 10 minutes before the activity. Patient Instructions:  Continue current medications as prescribed. Always keep a current medication list. Bring your medications to every office visit. Continue to follow up with primary care provider for non cardiac medical problems. Call the office with any problems, questions or concerns at [phone]. If you have been asked to keep a blood pressure log, do so for 2 weeks. Call the office to report readings to the triage nurse at [phone]. Follow up with cardiologist as scheduled. The following educational material has been included in this after visit summary for your review: Life simple 7. Heart health. Life simple 7  1) Manage blood pressure - high blood pressure is a major risk factor for heart disease and stroke. Keeping blood pressure in health range reduces strain on your heart, arteries and kidneys. Blood pressure goal is less than 130/80. 2) Control cholesterol - contributes to plaque, which can clog arteries and lead to heart disease and stroke.  When you control your cholesterol you are giving your arteries their best chance to remain clear. It is recommended that you get cholesterol lab work done once a year. 3) Reduce blood sugar - most of the food we eat is turning into glucose or blood sugar that our body uses for energy. Over time, high levels of blood sugar can damage your heart, kidneys, eyes and nerves. 4) Get active - living an active life is one of the most rewarding gifts you can give yourself and those you love. Simply put, daily physical activity increases your length and quality of life. Strive to exercise 15 minutes most days of the week. 5)  Eat better - A healthy diet is one of your best weapons for fighting cardiovascular disease. When you eat a heart healthy diet, you improve your chances for feeling good and staying healthy for life. 6)  Lose weight - when you shed extra fat an unnecessary pounds, you reduce the burden on your hear, lungs, blood vessels and skeleton. You give yourself the gift of active living, you lower your blood pressure and help yourself feel better. 7) Stop smoking - cigarette smokers have a higher risk of developing cardiovascular disease. If  You smoke, quitting is the best thing you can do for your health. Check American Heart Association on line for more information on Life's Simple 7 and tips for healthy living. A Healthy Heart: Care Instructions  Your Care Instructions     Coronary artery disease, also called heart disease, occurs when a substance called plaque builds up in the vessels that supply oxygen-rich blood to your heart muscle. This can narrow the blood vessels and reduce blood flow. A heart attack happens when blood flow is completely blocked. A high-fat diet, smoking, and other factors increase the risk of heart disease. Your doctor has found that you have a chance of having heart disease. You can do lots of things to keep your heart healthy.  It may not be easy, but you can change your diet, exercise more, and quit smoking. These steps really work to lower your chance of heart disease. Follow-up care is a key part of your treatment and safety. Be sure to make and go to all appointments, and call your doctor if you are having problems. It's also a good idea to know your test results and keep a list of the medicines you take. How can you care for yourself at home? Diet  · Use less salt when you cook and eat. This helps lower your blood pressure. Taste food before salting. Add only a little salt when you think you need it. With time, your taste buds will adjust to less salt. · Eat fewer snack items, fast foods, canned soups, and other high-salt, high-fat, processed foods. · Read food labels and try to avoid saturated and trans fats. They increase your risk of heart disease by raising cholesterol levels. · Limit the amount of solid fat-butter, margarine, and shortening-you eat. Use olive, peanut, or canola oil when you cook. Bake, broil, and steam foods instead of frying them. · Eat a variety of fruit and vegetables every day. Dark green, deep orange, red, or yellow fruits and vegetables are especially good for you. Examples include spinach, carrots, peaches, and berries. · Foods high in fiber can reduce your cholesterol and provide important vitamins and minerals. High-fiber foods include whole-grain cereals and breads, oatmeal, beans, brown rice, citrus fruits, and apples. · Eat lean proteins. Heart-healthy proteins include seafood, lean meats and poultry, eggs, beans, peas, nuts, seeds, and soy products. · Limit drinks and foods with added sugar. These include candy, desserts, and soda pop. Lifestyle changes  · If your doctor recommends it, get more exercise. Walking is a good choice. Bit by bit, increase the amount you walk every day. Try for at least 30 minutes on most days of the week. You also may want to swim, bike, or do other activities. · Do not smoke.  If you need help quitting, talk to your doctor about stop-smoking programs and medicines. These can increase your chances of quitting for good. Quitting smoking may be the most important step you can take to protect your heart. It is never too late to quit. · Limit alcohol to 2 drinks a day for men and 1 drink a day for women. Too much alcohol can cause health problems. · Manage other health problems such as diabetes, high blood pressure, and high cholesterol. If you think you may have a problem with alcohol or drug use, talk to your doctor. Medicines  · Take your medicines exactly as prescribed. Call your doctor if you think you are having a problem with your medicine. · If your doctor recommends aspirin, take the amount directed each day. Make sure you take aspirin and not another kind of pain reliever, such as acetaminophen (Tylenol). When should you call for help? [id number] if you have symptoms of a heart attack. These may include:  · Chest pain or pressure, or a strange feeling in the chest.  · Sweating. · Shortness of breath. · Pain, pressure, or a strange feeling in the back, neck, jaw, or upper belly or in one or both shoulders or arms. · Lightheadedness or sudden weakness. · A fast or irregular heartbeat. After you call 911, the  may tell you to chew 1 adult-strength or 2 to 4 low-dose aspirin. Wait for an ambulance. Do not try to drive yourself. Watch closely for changes in your health, and be sure to contact your doctor if you have any problems. Where can you learn more? Go to https://epicuriopeGreenlots.vitalclip. org and sign in to your [employer] account. Enter S769 in the [facility] box to learn more about \"A Healthy Heart: Care Instructions. \"     If you do not have an account, please click on the \"Sign Up Now\" link. Current as of: December 16, 2019               Content Version: 12.5  © 3370-0451 Healthwise, Incorporated. Care instructions adapted under license by [facility] ([facility]).  If you have questions about a medical condition or this instruction, always ask your healthcare professional. [place] [address] any warranty or liability for your use of this information.

## 2021-09-30 NOTE — PROGRESS NOTES
Sanna Andino is a 81 y.o. male evaluated via telephone on 9/30/2021. Consent:  He and/or health care decision maker is aware that that he may receive a bill for this telephone service, depending on his insurance coverage, and has provided verbal consent to proceed: Yes    Documentation:  I communicated with the patient and/or health care decision maker about CAD. Medication assessment. Details of this discussion including any medical advice provided: Heart health. NTG SL.    I affirm this is a Patient Initiated Episode with an Established Patient who has not had a related appointment within my department in the past 7 days or scheduled within the next 24 hours. Total Time: minutes: 5-10 minutes    Note: not billable if this call serves to triage the patient into an appointment for the relevant concern    CHELSIE Bryant      9/30/2021    Audio Patient Encouter(During DAI-07 public health emergency)  The telephone encourter was conducted with patient in their residence from [address] with CHELSIE Aden; assistance by Kenisha Arriola MA.    HPI:  Zeenat Howard   Diagnosis Orders   1. Coronary artery disease involving native coronary artery of native heart without angina pectoris     2. Hx of CABG     3. Diastolic dysfunction     4. Essential hypertension     5. Mixed hyperlipidemia       The patient presents today for audio evaluation. The patient was last seen in the office on 9/16/21 following a Lexiscan ordered by Dr. Juan Omer. He was recovering from pneumonia at that time. The patient reports some intermittent chest tightness. The Emmette Eugene showed a small area of apical anteroseptal infarct/ischemia versus artifact. The defect seen was similar to that on 10/20/18 imaging. The patient did not want to proceed with cardiac cath. Imdur was started. Today, the patient reports doing well other than some \"sharp pain in my little toe. \"  He reports no additional chest tightness. The patient denies symptoms to suggest myocardial ischemia, heart failure or arrhythmia. BP is well controlled on current regimen. The patient's PCP monitors cholesterol. SUBJECTIVE:  Shelia Dave denies exertional chest pain, shortness of breath, orthopnea, paroxysmal nocturnal dyspnea, syncope, presyncope, sensed arrhythmia, edema and fatigue. The patient denies numbness or weakness to suggest cerebrovascular accident or transient ischemic attack. Review of Systems    Prior to Visit Medications    Medication Sig Taking? Authorizing Provider   ACCU-CHEK CATARINO PLUS strip  Yes Historical Provider, MD   OXYGEN Inhale into the lungs 2L currently Yes Historical Provider, MD   isosorbide mononitrate (IMDUR) 30 MG extended release tablet Take 1 tablet by mouth nightly Yes CHELSIE Velarde   simvastatin (ZOCOR) 40 MG tablet TAKE 1 TABLET BY MOUTH EVERY DAY AT NIGHT Yes CHELSIE Velarde   ciprofloxacin (CIPRO) 500 MG tablet Take 500 mg by mouth 2 times daily Yes Historical Provider, MD   ramipril (ALTACE) 10 MG capsule Take 1 capsule by mouth daily Yes CHELSIE Plata   KLOR-CON M20 20 MEQ extended release tablet TAKE 1 TABLET BY MOUTH EVERY DAY Yes CHELSIE Velarde   clopidogrel (PLAVIX) 75 MG tablet TAKE 1 TABLET BY MOUTH EVERY DAY Yes CHELSIE Alberts - GLO   oxyCODONE-acetaminophen (PERCOCET) 5-325 MG per tablet Take 1 tablet by mouth every 6 hours as needed for Pain. Yes Historical Provider, MD   bumetanide (BUMEX) 1 MG tablet TAKE 1 TABLET BY MOUTH EVERY DAY Yes CHELSIE Plata   triamcinolone (KENALOG) 0.1 % ointment Apply topically 2 times daily Apply topically 2 times daily. Yes Historical Provider, MD   Misc Natural Products (DEEP SLEEP PO) Take by mouth daily Yes Historical Provider, MD   traMADol (ULTRAM) 50 MG tablet Take 50 mg by mouth every 6 hours as needed for Pain.  Yes Historical Provider, MD   glipiZIDE (GLUCOTROL XL) 2.5 MG extended release tablet Take 2.5 mg by mouth daily Yes Historical Provider, MD   carvedilol (COREG) 12.5 MG tablet Take 12.5 mg by mouth 2 times daily Yes Historical Provider, MD   levothyroxine (SYNTHROID) 25 MCG tablet Take 25 mcg by mouth Daily Yes Historical Provider, MD   cetirizine (ZYRTEC) 10 MG tablet Take 10 mg by mouth daily Yes Historical Provider, MD   mometasone (NASONEX) 50 MCG/ACT nasal spray 2 sprays daily as needed  Yes Historical Provider, MD   theophylline CR, 12 hour, (THEODUR) 300 MG CR tablet Take 300 mg by mouth every 8 hours 1/2 tablet tid  Yes Historical Provider, MD   albuterol (PROVENTIL HFA;VENTOLIN HFA) 108 (90 BASE) MCG/ACT inhaler Inhale 2 puffs into the lungs every 4 hours as needed for Wheezing Yes Historical Provider, MD   Multiple Vitamins-Minerals (CENTRUM SILVER) TABS Take 1 tablet by mouth daily. Yes Historical Provider, MD   aspirin 81 MG tablet Take 81 mg by mouth daily. Yes Historical Provider, MD   omeprazole (PRILOSEC) 20 MG capsule Take 20 mg by mouth daily. Yes Historical Provider, MD   montelukast (SINGULAIR) 10 MG tablet Take 10 mg by mouth nightly. Yes Historical Provider, MD       Social History     Tobacco Use    Smoking status: Former Smoker     Types: Cigarettes    Smokeless tobacco: Never Used   Vaping Use    Vaping Use: Never used   Substance Use Topics    Alcohol use: No    Drug use: No        REVIEW OF SYSTEMS:  Constitutional - no appetite change, or unexpected weight change. No fever, chills or diaphoresis. No significant change in activity level or new onset of fatigue. HEENT - no significant rhinorrhea or epistaxis. No tinnitus or significant hearing loss. Eyes - no sudden vision change or amaurosis. No corneal arcus, xantholasma, subconjunctival hemorrhage or discharge. Respiratory - no significant wheezing, stridor, apnea or cough. No dyspnea on exertion or shortness of air. Cardiovascular - no exertional chest pain to suggest myocardial ischemia. No orthopnea or PND.   No sensation of sustained arrythmia. No occurrence of slow heart rate. No palpitations. No claudication. Gastrointestinal - no abdominal swelling or pain. No blood in stool. No severe constipation, diarrhea, nausea, or vomiting. Genitourinary - no dysuria, frequency, or urgency. No flank pain or hematuria. Musculoskeletal - no back pain or myalgia. No problems with gait. Extremities - no clubbing, cyanosis or extremity edema. Skin - no color change or rash. No pallor. No new surgical incision. Neurologic - no speech difficulty, facial asymmetry or lateralizing weakness. No seizures, presyncope or syncope. No significant dizziness. Hematologic - no easy bruising or excessive bleeding. Psychiatric - no severe anxiety or insomnia. No confusion. All other review of systems are negative. DATA REVIEWED:  9/9/21 Lexiscan  Impression:   There is small area of apical anteroseptal infarct/ischemia versus   artifact. Suggest: Similar area of defect noted on Lexiscan study from   10/20/2018 with normal EF. Clinical correlation is advised. Signed by Dr De Anda Morning      4/10/18 echo  Mitral valve leaflets are mildly thickened with preserved leaflet  mobility.   Trace mitral regurgitation.   Mildly thickened aortic valve leaflets with preserved leaflet mobility.   Mildly dilated left atrium.   Normal left ventricular size and function.   Mild concentric left ventricular hypertrophy.  E/A flow reversal noted.  Suggestive of diastolic dysfunction.    Signature   Electronically signed by Ronaldo Albarado MD(Interpreting   physician) on 04/10/2018 02:53 PM    Lab Results   Component Value Date    WBC 5.78 10/16/2015    HGB 12.5 (L) 10/16/2015    HCT 38.7 (L) 10/16/2015    MCV 93.9 10/16/2015     10/16/2015     Lab Results   Component Value Date     10/16/2015    K 5.2 (H) 10/16/2015    CL 99 10/16/2015    CO2 26 10/16/2015    BUN 21 10/16/2015    CREATININE 1.5 (H) 10/16/2015    GLUCOSE 108 10/16/2015 CALCIUM 9.2 10/16/2015    PROT 6.4 (L) 10/14/2015    LABALBU 4.0 10/14/2015    ALKPHOS 65 10/14/2015    AST 18 10/16/2015    ALT 14 10/14/2015    LABGLOM 48 (L) 10/16/2015       Lab Results   Component Value Date    CHOL 110 (L) 10/15/2015    CHOL 93 (L) 08/05/2015    CHOL 101 06/12/2012     Lab Results   Component Value Date    TRIG 202 (H) 10/15/2015    TRIG 121 (L) 08/05/2015    TRIG 113 06/12/2012     Lab Results   Component Value Date    HDL 24 (L) 10/15/2015    HDL 26 (L) 08/05/2015    HDL 30 06/12/2012     Lab Results   Component Value Date    LDLCHOLESTEROL 54 12/08/2010    LDLCALC 43 06/09/2011     ASSESSMENT/PlAN:   Diagnosis Orders   1. Coronary artery disease involving native coronary artery of native heart without angina pectoris     2. Hx of CABG     3. Diastolic dysfunction     4. Essential hypertension     5. Mixed hyperlipidemia          CAD s/p CABG - recent Lexiscan stable in comparison to 2018 nuclear stress test.  Imdur added at last office visit at 30 mg nightly. Patient reports no additional chest tightness. Continue current medical management.     HTN - normotensive on current regimen. Continue same.     Hyperlipidemia - LDL 54. Continue Zocor 40 mg daily.     Patient is compliant with medication regimen. Continue current medical management for cardiac condition. Continue current medications as prescribed. BP goal is 130/80 or less. If your primary care provider is outside of the [facility], please request your labs be faxed to this office at [phone]. Continue to follow up with primary care provider for non cardiac medical problems. Call the office with any problems, questions or concerns at [phone]. Follow up with cardiologist as scheduled in [address] [place] [address] Rd. Dr. Jon Skinner as scheduled. The following educational material has been included in this after visit summary for patient review:  Heart health. NTG sl.     Harley Sainz is a 81 y.o. male being evaluated by a telephone encounter to address concerns as mentioned above. A caregiver was present when appropriate. Due to this being a TeleHealth encounter (During PKUUK-52 public health emergency). Pursuant to the emergency declaration under the [address] [place] [address], [address] [place] [address] authority and the [employer] Act, this Virtual Visit was conducted with patient's (and/or legal guardian's) consent, to reduce the patient's risk of exposure to COVID-19 and provide necessary medical care. The patient (and/or legal guardian) has also been advised to contact this office for worsening conditions or problems, and seek emergency medical treatment and/or call 911 if deemed necessary. Services were provided through a telephone discussion virtually to substitute for in-person clinic visit. Patient and provider were located at their individual homes. --CHELSIE Fitzgerald on 9/30/2021 at 9:48 AM    An electronic signature was used to authenticate this note.

## 2021-10-08 RX ORDER — ISOSORBIDE MONONITRATE 30 MG/1
TABLET, EXTENDED RELEASE ORAL
Qty: 30 TABLET | Refills: 3 | Status: SHIPPED | OUTPATIENT
Start: 2021-10-08 | End: 2022-01-04

## 2021-10-10 DIAGNOSIS — I25.10 CORONARY ARTERY DISEASE INVOLVING NATIVE HEART WITHOUT ANGINA PECTORIS, UNSPECIFIED VESSEL OR LESION TYPE: ICD-10-CM

## 2021-10-11 RX ORDER — CLOPIDOGREL BISULFATE 75 MG/1
TABLET ORAL
Qty: 90 TABLET | Refills: 3 | Status: SHIPPED | OUTPATIENT
Start: 2021-10-11 | End: 2022-10-10

## 2021-11-08 DIAGNOSIS — E87.79 OTHER FLUID OVERLOAD: ICD-10-CM

## 2021-11-09 RX ORDER — BUMETANIDE 1 MG/1
TABLET ORAL
Qty: 90 TABLET | Refills: 3 | Status: SHIPPED | OUTPATIENT
Start: 2021-11-09 | End: 2022-08-01

## 2021-11-23 DIAGNOSIS — I10 ESSENTIAL HYPERTENSION: ICD-10-CM

## 2021-11-24 RX ORDER — POTASSIUM CHLORIDE 1500 MG/1
TABLET, EXTENDED RELEASE ORAL
Qty: 90 TABLET | Refills: 3 | Status: SHIPPED | OUTPATIENT
Start: 2021-11-24

## 2022-01-04 RX ORDER — ISOSORBIDE MONONITRATE 30 MG/1
TABLET, EXTENDED RELEASE ORAL
Qty: 90 TABLET | Refills: 1 | Status: SHIPPED | OUTPATIENT
Start: 2022-01-04 | End: 2022-07-05

## 2022-01-19 ENCOUNTER — TELEPHONE (OUTPATIENT)
Dept: CARDIOLOGY CLINIC | Age: 87
End: 2022-01-19

## 2022-01-19 NOTE — TELEPHONE ENCOUNTER
[facility] calling to get the patient seen sooner. Pt was having some chest pain and there were some concern with patients xray.  Please return call

## 2022-01-19 NOTE — TELEPHONE ENCOUNTER
Called patient and did not get an answer. Left vm to call back. If patient calls back I have a spot reserved at [facility] in [facility] on 1/26 at 1:45 pm. Please give patient the appointment.

## 2022-03-02 DIAGNOSIS — I10 ESSENTIAL HYPERTENSION: ICD-10-CM

## 2022-03-02 RX ORDER — RAMIPRIL 10 MG/1
CAPSULE ORAL
Qty: 90 CAPSULE | Refills: 3 | Status: SHIPPED | OUTPATIENT
Start: 2022-03-02

## 2022-04-12 ENCOUNTER — OFFICE VISIT (OUTPATIENT)
Dept: CARDIOLOGY CLINIC | Age: 87
End: 2022-04-12
Payer: MEDICARE

## 2022-04-12 VITALS
WEIGHT: 215 LBS | HEART RATE: 63 BPM | HEIGHT: 68 IN | BODY MASS INDEX: 32.58 KG/M2 | DIASTOLIC BLOOD PRESSURE: 60 MMHG | SYSTOLIC BLOOD PRESSURE: 130 MMHG

## 2022-04-12 DIAGNOSIS — I10 ESSENTIAL HYPERTENSION: Primary | ICD-10-CM

## 2022-04-12 PROCEDURE — 1123F ACP DISCUSS/DSCN MKR DOCD: CPT | Performed by: INTERNAL MEDICINE

## 2022-04-12 PROCEDURE — 93000 ELECTROCARDIOGRAM COMPLETE: CPT | Performed by: INTERNAL MEDICINE

## 2022-04-12 PROCEDURE — 4040F PNEUMOC VAC/ADMIN/RCVD: CPT | Performed by: INTERNAL MEDICINE

## 2022-04-12 PROCEDURE — 1036F TOBACCO NON-USER: CPT | Performed by: INTERNAL MEDICINE

## 2022-04-12 PROCEDURE — 99214 OFFICE O/P EST MOD 30 MIN: CPT | Performed by: INTERNAL MEDICINE

## 2022-04-12 PROCEDURE — G8427 DOCREV CUR MEDS BY ELIG CLIN: HCPCS | Performed by: INTERNAL MEDICINE

## 2022-04-12 PROCEDURE — G8417 CALC BMI ABV UP PARAM F/U: HCPCS | Performed by: INTERNAL MEDICINE

## 2022-04-12 NOTE — PROGRESS NOTES
HISTORY  60-year-old gentleman with a history of combined dyslipidemia, prior tobacco abuse/COPD, hypertension, and coronary disease returns for follow-up. There is a history of bypass grafting in 1996 with 2 stents placed in 2003, repeats clearly the year 2 later, and balloon angioplasty in 2015. He had a negative Lexiscan in 2018 with a repeat in September 2021 interpreted as showing a small area of apical/anterior septal infarct/ischemia versus artifact. His blood pressures been controlled and his lipids were last recorded in August 2021 with an LDL of 45, HDL 29, and triglycerides of 187. At the time of his last visit he was placed on Ismo because of recurrent angina. On return today his primary complaint is that of shortness of breath for which he is maintained on 2 L of oxygen in constant fashion. His anginal pattern is stable according to history. He has been vaccinated and boosted for COVID-19. PHYSICAL EXAM  On exam he carries 215 pounds on a 5 foot 8 inch frame. Pressure is 130/60 pulse of 63. Endomorphic elderly gentleman with nasal O2 in place. EOMs full, sclerae and conjunctiva normal. PERRLA. Mask in place. Trachea midline with no neck masses. Assessment of internal jugular veins reveals no elevation of central venous pressure at 45 degrees. Carotid pulses normal without delay or bruit. Thyroid normal to palpation. Chest exam reveals him to be mildly dyspneic with decreased breath sounds and prolonged expiratory phase. No skin lesions seen. PMI normal.  Distant heart sounds. Obese abdomen. Normal bowel sounds without palpable mass or bruit. No clubbing or acrocyanosis. No significant lower extremity edema or signs of venous insufficiency. General motor strength appears to be within normal limits. Normal range of motion with normal gait. Alert, oriented x 3, memory and cognition normal as reflected by history and conversation.   EKG reveals sinus rhythm with a first-degree AV block [220 ms] and a left bundle branch block. ASSESSMENT/PLAN:   1. Coronary disease -stable angina with an apparent abnormal Lexiscan dual-isotope as noted above. Discussed with patient and family the important distinction between stable angina and unstable angina, advising them to contact us should there be a change in the pattern. Continue Imdur, Plavix, and aspirin. 2.  Dyslipidemia -monitored and managed by primary care. Continue Zocor. 3.  Hypertension -adequate control. Continue Altase, Imdur, Bumex, and carvedilol.   4.  Pandemic response -appropriate/vaccinated/boosted FREE:[LAST:[Dr Singh],PHONE:[(   )    -],FAX:[(   )    -]]

## 2022-07-05 RX ORDER — ISOSORBIDE MONONITRATE 30 MG/1
TABLET, EXTENDED RELEASE ORAL
Qty: 90 TABLET | Refills: 1 | Status: SHIPPED | OUTPATIENT
Start: 2022-07-05

## 2022-07-30 DIAGNOSIS — E87.79 OTHER FLUID OVERLOAD: ICD-10-CM

## 2022-08-01 RX ORDER — BUMETANIDE 1 MG/1
TABLET ORAL
Qty: 90 TABLET | Refills: 3 | Status: SHIPPED | OUTPATIENT
Start: 2022-08-01

## 2022-09-07 DIAGNOSIS — E78.2 MIXED HYPERLIPIDEMIA: ICD-10-CM

## 2022-09-07 RX ORDER — SIMVASTATIN 40 MG
TABLET ORAL
Qty: 90 TABLET | Refills: 3 | OUTPATIENT
Start: 2022-09-07

## 2022-09-18 DIAGNOSIS — E78.2 MIXED HYPERLIPIDEMIA: ICD-10-CM

## 2022-09-19 DIAGNOSIS — E78.2 MIXED HYPERLIPIDEMIA: ICD-10-CM

## 2022-09-19 RX ORDER — SIMVASTATIN 40 MG
TABLET ORAL
Qty: 90 TABLET | Refills: 3 | Status: SHIPPED | OUTPATIENT
Start: 2022-09-19

## 2022-09-19 RX ORDER — SIMVASTATIN 40 MG
TABLET ORAL
Qty: 90 TABLET | Refills: 3 | OUTPATIENT
Start: 2022-09-19

## 2022-10-08 DIAGNOSIS — I25.10 CORONARY ARTERY DISEASE INVOLVING NATIVE HEART WITHOUT ANGINA PECTORIS, UNSPECIFIED VESSEL OR LESION TYPE: ICD-10-CM

## 2022-10-10 RX ORDER — CLOPIDOGREL BISULFATE 75 MG/1
TABLET ORAL
Qty: 90 TABLET | Refills: 3 | Status: SHIPPED | OUTPATIENT
Start: 2022-10-10

## 2022-10-12 ENCOUNTER — OFFICE VISIT (OUTPATIENT)
Dept: CARDIOLOGY CLINIC | Age: 87
End: 2022-10-12
Payer: MEDICARE

## 2022-10-12 VITALS
OXYGEN SATURATION: 98 % | WEIGHT: 209 LBS | HEIGHT: 68 IN | SYSTOLIC BLOOD PRESSURE: 138 MMHG | HEART RATE: 64 BPM | BODY MASS INDEX: 31.67 KG/M2 | DIASTOLIC BLOOD PRESSURE: 68 MMHG

## 2022-10-12 DIAGNOSIS — I25.118 CORONARY ARTERY DISEASE OF NATIVE ARTERY OF NATIVE HEART WITH STABLE ANGINA PECTORIS (HCC): Primary | ICD-10-CM

## 2022-10-12 DIAGNOSIS — E78.2 MIXED HYPERLIPIDEMIA: ICD-10-CM

## 2022-10-12 DIAGNOSIS — I10 ESSENTIAL HYPERTENSION: ICD-10-CM

## 2022-10-12 PROCEDURE — 99214 OFFICE O/P EST MOD 30 MIN: CPT | Performed by: CLINICAL NURSE SPECIALIST

## 2022-10-12 PROCEDURE — G8417 CALC BMI ABV UP PARAM F/U: HCPCS | Performed by: CLINICAL NURSE SPECIALIST

## 2022-10-12 PROCEDURE — 1123F ACP DISCUSS/DSCN MKR DOCD: CPT | Performed by: CLINICAL NURSE SPECIALIST

## 2022-10-12 PROCEDURE — 1036F TOBACCO NON-USER: CPT | Performed by: CLINICAL NURSE SPECIALIST

## 2022-10-12 PROCEDURE — G8484 FLU IMMUNIZE NO ADMIN: HCPCS | Performed by: CLINICAL NURSE SPECIALIST

## 2022-10-12 PROCEDURE — G8427 DOCREV CUR MEDS BY ELIG CLIN: HCPCS | Performed by: CLINICAL NURSE SPECIALIST

## 2022-10-12 NOTE — PATIENT INSTRUCTIONS
Let us know if you have an increased need in your nitro then we can increase your isosorbide   Follow up in 6 os With Dr. Ganesh Espinoza   Call with any questions or concerns  Follow up with Bhargav Ordoñez MD for non cardiac problems  Report any new problems  Cardiovascular Fitness-Exercise as tolerated. Strive for 30 minutes of exercise most days of the week. Cardiac / Healthy Diet  Continue current medications as directed  Continue plan of treatment  It is always recommended that you bring your medications bottles with you to each visit - this is for your safety!

## 2022-10-12 NOTE — PROGRESS NOTES
[facility]  [facility] [address]  [address]  Phone: [phone]  Fax: [phone]    OFFICE VISIT:  10/12/2022    Avery Halsted - : 1935    Reason For Visit:  Julian Gonzales is a 81 y.o. male who is here for 6 Month Follow-Up (No cardiac symptoms), Coronary Artery Disease, and Hypertension  History dyslipidemia, prior tobacco abuse with COPD, hypertension and coronary disease with bypass grafting in  and stents placed in . He had angioplasty in  and a negative Lexiscan last in 2021  Is been maintained on medical management. He reports today for routine follow-up. He has some occasional angina. It is usually resolved wih rest or 1 nitro he reports he lives at home. He notices these episodes when he tried to do too much during the day. Last time he required nitroglycerin was about a week ago    Wear O2 most all the time. On 2-3 L  He is napping some during the last   then has trouble falling asleep at times because of this    Follows with his primary care and gets his labs done once a year with the [place]. He states that he cut his aspirin back to 3 days a week    Subjective  Julian Gonzales denies , resting shortness of breath, orthopnea, paroxysmal nocturnal dyspnea, syncope, presyncope, arrhythmia, edema and fatigue. The patient denies numbness or weakness to suggest cerebrovascular accident or transient ischemic attack. Yudith Morelos MD is PCP and follows labs.   Avery Halsted has the following history as recorded in [facility]:    Patient Active Problem List    Diagnosis Date Noted    Hx of CABG     Diastolic dysfunction     Panlobular emphysema (HCC)     LONG TERM ANTICOAGULENT USE     Hypertension     BPH (benign prostatic hyperplasia)     CAD (coronary artery disease)     Hyperlipidemia      Past Medical History:   Diagnosis Date    Benign hypertension     BPH (benign prostatic hyperplasia)     CAD (coronary artery disease)     Bypass Graft---LIMA grafted to the LAD and individual saphenous vein graft placed to a diagonal branch and a saphenous vein Y-graft to the first and second obtuse marginal branches. Cancer ([address]     COPD (chronic obstructive pulmonary disease) (HCC)     Fracture of T12 vertebra (HCC)     Hyperglycemia     Hyperlipidemia     Hypertension     LONG TERM ANTICOAGULENT USE      Past Surgical History:   Procedure Laterality Date    CARDIAC CATHETERIZATION  4/25/04, 5/25/04    LEFT Heart Cath, see scanned report    CARDIAC CATHETERIZATION  2/8/01, 6/9/03    LEFT Heart Cath, see scanned report    CARDIAC CATHETERIZATION  8/5/15  MDL    balloon angioplasty of in-stent stenosis of RCA    CARDIAC CATHETERIZATION  10/15/15  MDL    stent to RCA. EF 55%    CARDIOVASCULAR STRESS TEST  04/24/08, MDL    Stress Echo    CARPAL TUNNEL RELEASE  07/28/05, Gerardo    LEFT anterior subcutaneous ulnar nerve transpostion and release of Guyon's canal carpal tunnel release.     CORONARY ARTERY BYPASS GRAFT  07/2007    x 4    ELBOW SURGERY      Left     Family History   Problem Relation Age of Onset    Coronary Art Dis Mother     Hypertension Mother     Cancer Father     Cancer Sister     Pacemaker Brother     Dementia Brother     Stroke Sister      Social History     Tobacco Use    Smoking status: Former     Types: Cigarettes    Smokeless tobacco: Never   Substance Use Topics    Alcohol use: No      Current Outpatient Medications   Medication Sig Dispense Refill    clopidogrel (PLAVIX) 75 MG tablet TAKE 1 TABLET BY MOUTH EVERY DAY (Patient taking differently: Takes Mon Wed and Fri) 90 tablet 3    simvastatin (ZOCOR) 40 MG tablet TAKE 1 TABLET BY MOUTH EVERY DAY AT NIGHT 90 tablet 3    bumetanide (BUMEX) 1 MG tablet TAKE 1 TABLET BY MOUTH EVERY DAY (Patient taking differently: Takes a half a pill daily) 90 tablet 3    ramipril (ALTACE) 10 MG capsule TAKE 1 CAPSULE BY MOUTH EVERY DAY 90 capsule 3    ACCU-CHEK CATARINO PLUS strip       OXYGEN Inhale into the lungs 2L currently      oxyCODONE-acetaminophen (PERCOCET) 5-325 MG per tablet Take 1 tablet by mouth every 6 hours as needed for Pain.      triamcinolone (KENALOG) 0.1 % ointment Apply topically 2 times daily Apply topically 2 times daily. traMADol (ULTRAM) 50 MG tablet Take 50 mg by mouth every 6 hours as needed for Pain. carvedilol (COREG) 12.5 MG tablet Take 12.5 mg by mouth 2 times daily      cetirizine (ZYRTEC) 10 MG tablet Take 10 mg by mouth daily      theophylline CR, 12 hour, (THEODUR) 300 MG CR tablet Take 300 mg by mouth every 8 hours 1/2 tablet tid       albuterol (PROVENTIL HFA;VENTOLIN HFA) 108 (90 BASE) MCG/ACT inhaler Inhale 2 puffs into the lungs every 4 hours as needed for Wheezing      Multiple Vitamins-Minerals (CENTRUM SILVER) TABS Take 1 tablet by mouth daily. aspirin 81 MG tablet Take 81 mg by mouth daily. omeprazole (PRILOSEC) 20 MG delayed release capsule Take 20 mg by mouth daily. isosorbide mononitrate (IMDUR) 30 MG extended release tablet TAKE 1 TABLET BY MOUTH EVERY DAY AT NIGHT 90 tablet 1    KLOR-CON M20 20 MEQ extended release tablet TAKE 1 TABLET BY MOUTH EVERY DAY 90 tablet 3    Misc Natural Products (DEEP SLEEP PO) Take by mouth daily      glipiZIDE (GLUCOTROL XL) 2.5 MG extended release tablet Take 2.5 mg by mouth daily      levothyroxine (SYNTHROID) 25 MCG tablet Take 25 mcg by mouth Daily      mometasone (NASONEX) 50 MCG/ACT nasal spray 2 sprays daily as needed        No current facility-administered medications for this visit. Allergies: Adhesive tape and Wound dressing adhesive    Review of Systems  Constitutional - no significant activity change, appetite change, or unexpected weight change. No fever, chills or diaphoresis. No fatigue. HEENT - no significant rhinorrhea or epistaxis. No tinnitus or significant hearing loss. Eyes - no sudden vision change or amaurosis. Respiratory - no significant wheezing, stridor, apnea or cough.   No dyspnea on exertion or shortness of breath. Cardiovascular - no exertional chest pain, orthopnea or PND. No sensation of arrhythmia or slow heart rate. No claudication or leg edema. Gastrointestinal - no abdominal swelling or pain. No blood in stool. No severe constipation, diarrhea, nausea, or vomiting. Genitourinary - no difficulty urinating, dysuria, frequency, or urgency. No flank pain or hematuria. Musculoskeletal - no back pain, gait disturbance, or myalgia. Skin - no color change or rash. No pallor. No new surgical incision. Neurologic - no speech difficulty, facial asymmetry or lateralizing weakness. No seizures, presyncope, syncope, or significant dizziness. Hematologic - no easy bruising or excessive bleeding. Psychiatric - no severe anxiety or insomnia. No confusion. All other review of systems are negative. Objective  Vital Signs - /68   Pulse 64   Ht 5' 8\" (1.727 m)   Wt 209 lb (94.8 kg)   SpO2 98%   BMI 31.78 kg/m²   General - Ruddy Castillo is alert, cooperative, and pleasant. Well groomed. No acute distress. Body habitus is overweight. HEENT - The head is normocephalic. No circumoral cyanosis. Dentition is normal.   EYES -  No Xanthelasma, no arcus senilis, no conjunctival hemorrhages or discharge. Neck - Supple, without increased jugular venous pressures. No carotid bruits. No mass. Respiratory - Lungs are clear bilaterally. No wheezes or rales. Normal effort without use of accessory muscles  wearing O2   Cardiovascular - Heart has regular rhythm and rate. No murmurs, rubs or gallops. + pedal pulses and no varicosities. Abdominal -  Soft, nontender, nondistended. Bowel sounds are intact. Extremities - No clubbing, cyanosis, or  edema. Musculoskeletal -  No clubbing . No Osler's nodes. Gait normal .  No kyphosis or scoliosis. Skin - no statis ulcers or dermatitis. Neurological - No focal signs are identified.   Oriented to person, place and time. Psychiatric -  Appropriate affect and mood. Assessment:     Diagnosis Orders   1. Coronary artery disease of native artery of native heart with stable angina pectoris ([address]        2. Essential hypertension        3. Mixed hyperlipidemia          Data:  BP Readings from Last 3 Encounters:   10/12/22 138/68   04/12/22 130/60   09/16/21 (!) 140/80    Pulse Readings from Last 3 Encounters:   10/12/22 64   04/12/22 63   09/16/21 74        Wt Readings from Last 3 Encounters:   10/12/22 209 lb (94.8 kg)   04/12/22 215 lb (97.5 kg)   09/16/21 221 lb (100.2 kg)     Blood pressure and heart rate controlled. Medical management includes beta-blocker, ACE inhibitor and long-acting nitrate. Takes low-dose diuretic daily along with aspirin and Plavix 3 days a week. Remains on statin     Reviewed PCP recent notes   Reviewed recent labs-under media    Last Cleia scan 9/9/2021  Impression:   There is small area of apical anteroseptal infarct/ischemia versus   artifact. Suggest: Similar area of defect noted on Lexiscan study from   10/20/2018 with normal EF. Clinical correlation is advised. Signed by Dr Nick Lucas       Has some occasional exertional angina relieved with nitro. No significant change in the last year. We discussed letting us know if he has any increase in these episodes and we can uptitrate his isosorbide     States taking medications as prescribed  Stable cardiovascular status. No evidence of overt heart failure, worsening or unstable angina or dysrhythmia. 30 minutes were spent preparing, reviewing and seeing patient. All questions answered    Plan  Let us know if you have an increased need in your nitro then we can increase your isosorbide   Follow up in 6 os With Dr. Boles Sis   Call with any questions or concerns  Follow up with Burke Chau MD for non cardiac problems  Report any new problems  Cardiovascular Fitness-Exercise as tolerated.   Strive for 30 minutes of exercise most days of the week. Cardiac / Healthy Diet  Continue current medications as directed  Continue plan of treatment  It is always recommended that you bring your medications bottles with you to each visit - this is for your safety! CHELSIE Chen/transcription disclaimer: Much of this encounter note is electronic transcription/translation of spoken language to printed tach. Electronic translation of spoken language may be erroneous, or at times, nonsensical words or phrases may be inadvertently transcribed.  Although, I have reviewed the note for such errors, some may still exist.

## 2022-11-30 DIAGNOSIS — I10 ESSENTIAL HYPERTENSION: ICD-10-CM

## 2022-11-30 RX ORDER — POTASSIUM CHLORIDE 1500 MG/1
TABLET, EXTENDED RELEASE ORAL
Qty: 90 TABLET | Refills: 3 | Status: SHIPPED | OUTPATIENT
Start: 2022-11-30

## 2022-12-30 RX ORDER — ISOSORBIDE MONONITRATE 30 MG/1
TABLET, EXTENDED RELEASE ORAL
Qty: 90 TABLET | Refills: 1 | Status: SHIPPED | OUTPATIENT
Start: 2022-12-30

## 2023-02-25 DIAGNOSIS — I10 ESSENTIAL HYPERTENSION: ICD-10-CM

## 2023-02-27 RX ORDER — RAMIPRIL 10 MG/1
CAPSULE ORAL
Qty: 90 CAPSULE | Refills: 3 | Status: SHIPPED | OUTPATIENT
Start: 2023-02-27

## 2023-03-03 ENCOUNTER — OFFICE VISIT (OUTPATIENT)
Dept: UROLOGY | Age: 88
End: 2023-03-03

## 2023-03-03 VITALS — HEIGHT: 68 IN | TEMPERATURE: 98.1 F | BODY MASS INDEX: 31.22 KG/M2 | WEIGHT: 206 LBS

## 2023-03-03 DIAGNOSIS — R31.0 GROSS HEMATURIA: Primary | ICD-10-CM

## 2023-03-03 DIAGNOSIS — N13.8 BPH WITH OBSTRUCTION/LOWER URINARY TRACT SYMPTOMS: ICD-10-CM

## 2023-03-03 DIAGNOSIS — N40.1 BPH WITH OBSTRUCTION/LOWER URINARY TRACT SYMPTOMS: ICD-10-CM

## 2023-03-03 LAB
BACTERIA URINE, POC: 0
BILIRUBIN URINE: 0 MG/DL
BLOOD, URINE: POSITIVE
CASTS URINE, POC: 0
CLARITY: CLEAR
COLOR: YELLOW
CRYSTALS URINE, POC: 0
EPI CELLS URINE, POC: 0
GLUCOSE URINE: ABNORMAL
KETONES, URINE: NEGATIVE
LEUKOCYTE EST, POC: ABNORMAL
NITRITE, URINE: NEGATIVE
PH UA: 5 (ref 4.5–8)
POST VOID RESIDUAL (PVR): 55 ML
PROTEIN UA: POSITIVE
RBC URINE, POC: 62
SPECIFIC GRAVITY UA: 1.02 (ref 1–1.03)
UROBILINOGEN, URINE: ABNORMAL
WBC URINE, POC: 6
YEAST URINE, POC: 0

## 2023-03-03 RX ORDER — TAMSULOSIN HYDROCHLORIDE 0.4 MG/1
0.4 CAPSULE ORAL DAILY
Qty: 30 CAPSULE | Refills: 0 | Status: SHIPPED | OUTPATIENT
Start: 2023-03-03

## 2023-03-03 RX ORDER — NITROGLYCERIN 0.4 MG/1
TABLET SUBLINGUAL
COMMUNITY

## 2023-03-03 ASSESSMENT — ENCOUNTER SYMPTOMS
ABDOMINAL PAIN: 0
NAUSEA: 0
ABDOMINAL DISTENTION: 0
BACK PAIN: 0
VOMITING: 0

## 2023-03-03 NOTE — PROGRESS NOTES
Tre Granado is a 81 y.o., male, New patient who presents today   Chief Complaint   Patient presents with    New Patient     I am here today for my new patient visit for hematuria. Went through multiple courses of antibiotics for infections. Feel like I'm not emptying well. HPI   Patient presents for evaluation of gross hematuria. He reports about a month ago he began experiencing suprapubic pain with urination as well as some dysuria. He went for evaluation by his primary care provider who ran a urine culture which revealed Enterococcus faecalis. He was initiated on antibiotic therapy and subsequently developed gross hematuria. He reports being on 3 different rounds of antibiotics because neither his urinalysis nor symptoms completely cleared. He reports he is having some mild suprapubic pressure today. Lower urinary tract symptoms include feeling of incomplete emptying, frequency, intermittency, urgency, weak stream, straining, nocturia x3. He has an AUA symptom score 21/35 with a bother score of 3. He is not currently maintained on any urologic medications. He does report 1 prior UTI several years ago with hematuria at that time as well. He denies any personal or familial history of nephrolithiasis or urologic cancers. He is chronically maintained on Plavix secondary to CAD. He states he has been on varying anticoagulants/antiplatelets since the late 90s. He is a previous smoker with a 40-pack-year history. 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    2/9/23  Entero faecalis    REVIEW OF SYSTEMS:  Review of Systems   Constitutional:  Negative for chills and fever. Respiratory:          O2 therapy   Gastrointestinal:  Negative for abdominal distention, abdominal pain, nausea and vomiting. Genitourinary:  Positive for dysuria and hematuria. Negative for difficulty urinating, flank pain, frequency and urgency. Musculoskeletal:  Negative for back pain and gait problem. Psychiatric/Behavioral:  Negative for agitation and confusion. PHYSICAL EXAM:  Temp 98.1 °F (36.7 °C) (Temporal)   Ht 5' 8\" (1.727 m)   Wt 206 lb (93.4 kg)   BMI 31.32 kg/m²   Physical Exam  Vitals and nursing note reviewed. Constitutional:       General: He is not in acute distress. Appearance: Normal appearance. He is not ill-appearing. Pulmonary:      Effort: Pulmonary effort is normal. No respiratory distress. Abdominal:      General: There is no distension. Tenderness: There is no abdominal tenderness. There is no right CVA tenderness or left CVA tenderness. Genitourinary:     Prostate: Enlarged (50-60g) and nodules present (lower left apex, gumball size). Neurological:      Mental Status: He is alert and oriented to person, place, and time. Mental status is at baseline. Motor: Weakness present. Gait: Gait abnormal.   Psychiatric:         Mood and Affect: Mood normal.         Behavior: Behavior normal.       DATA:  Results for orders placed or performed in visit on 03/03/23   POCT Urinalysis Dipstick w/ Micro (Auto)   Result Value Ref Range    Color, UA Yellow     Clarity, UA Clear Clear    Glucose, Ur neg     Bilirubin Urine 0 mg/dL    Ketones, Urine Negative     Specific Gravity, UA 1.020 1.005 - 1.030    Blood, Urine Positive     pH, UA 5.0 4.5 - 8.0    Protein, UA Positive (A) Negative    Nitrite, Urine Negative     Leukocytes, UA small     Urobilinogen, Urine 0.2 mg/dL     RBC Urine, POC 62     WBC Urine, POC 6     Bacteria Urine, POC 0     yeast urine, poc 0     Casts Urine, POC 0     Epi Cells Urine, POC 0     crystals urine, poc 0    KY Measure, post-void residual, US, non-imaging   Result Value Ref Range    post void residual 55 ml     ASSESSMENT/PLAN  1. Gross hematuria  Hematuria is explained to the patient as blood in the urine, gross (visible to the naked eye) or microscopic. In many patients, the condition is benign and of no consequence.  However, it may be related to an underlying cause including but not limited to malignancies or infections of the urinary system, renal mass, nephrolithiasis, or BPH. Evaluation options have been discussed and explained to patient. Taking into account his medical, family, and social history, this patient falls into a high category. We will proceed with cystoscopy and CT Urogram.  [facility] He may not be able to have contrasted CT imaging.     - CT ABDOMEN PELVIS W WO CONTRAST Additional Contrast? Radiologist Recommendation (Urogram Protocol); Future  - Cystoscopy; Future    2. BPH with obstruction/lower urinary tract symptoms  Patient with abnormal KAREN and lower urinary tract symptoms. Although his most bothersome symptom is urgency and he does have a low postvoid residual, I will first initiate him on alpha-blocker therapy. Could consider beta 3 agonists in the future if symptoms or not well relieved. - MI Measure, post-void residual, US, non-imaging  - tamsulosin (FLOMAX) 0.4 MG capsule; Take 1 capsule by mouth daily  Dispense: 30 capsule; Refill: 0      Orders Placed This Encounter   Procedures    CT ABDOMEN PELVIS W WO CONTRAST Additional Contrast? Radiologist Recommendation (Urogram Protocol)     Standing Status:   Future     Standing Expiration Date:   3/2/2024     Scheduling Instructions:      CT for Urogram protocol     Order Specific Question:   Additional Contrast?     Answer:   Radiologist Recommendation     Comments:   Urogram Protocol     Order Specific Question:   STAT Creatinine as needed:     Answer:   Yes    POCT Urinalysis Dipstick w/ Micro (Auto)    MI Measure, post-void residual, US, non-imaging    Cystoscopy     Next available     Standing Status:   Future     Standing Expiration Date:   3/2/2024     Scheduling Instructions:      Next available        Return for cystoscopy, with Dr. Chester Mansfield, [address] prior.     An electronic signature was used to authenticate this note.    Rober Stevens, APRN - CNP    All information inputted into the note by the MA to include chief complaint, past medical history, past surgical history, medications, allergies, social and family history and review of systems has been reviewed and updated as needed by me. EMR Dragon/transcription disclaimer: Much of this document is electronic transcription/translation of spoken language to printed text. The electronic translation of spoken language may be erroneous or, at times, nonsensical words or phrases may be inadvertently transcribed.  Although I have reviewed the document for such errors, some may still exist.

## 2023-03-25 DIAGNOSIS — N13.8 BPH WITH OBSTRUCTION/LOWER URINARY TRACT SYMPTOMS: ICD-10-CM

## 2023-03-25 DIAGNOSIS — N40.1 BPH WITH OBSTRUCTION/LOWER URINARY TRACT SYMPTOMS: ICD-10-CM

## 2023-03-27 RX ORDER — TAMSULOSIN HYDROCHLORIDE 0.4 MG/1
CAPSULE ORAL
Qty: 90 CAPSULE | Refills: 0 | Status: SHIPPED | OUTPATIENT
Start: 2023-03-27

## 2023-03-30 ENCOUNTER — HOSPITAL ENCOUNTER (OUTPATIENT)
Dept: CT IMAGING | Age: 88
Discharge: HOME | End: 2023-03-30
Payer: MEDICARE

## 2023-03-30 DIAGNOSIS — R31.0 GROSS HEMATURIA: ICD-10-CM

## 2023-03-30 LAB — CREAT SERPL-MCNC: 1.8 MG/DL (ref 0.3–1.3)

## 2023-03-30 PROCEDURE — 74178 CT ABD&PLV WO CNTR FLWD CNTR: CPT

## 2023-03-30 PROCEDURE — 74178 CT ABD&PLV WO CNTR FLWD CNTR: CPT | Performed by: RADIOLOGY

## 2023-03-30 PROCEDURE — 6360000004 HC RX CONTRAST MEDICATION: Performed by: NURSE PRACTITIONER

## 2023-03-30 PROCEDURE — 82565 ASSAY OF CREATININE: CPT

## 2023-03-30 RX ADMIN — IOPAMIDOL 75 ML: 755 INJECTION, SOLUTION INTRAVENOUS at 14:47

## 2023-05-01 ENCOUNTER — PROCEDURE VISIT (OUTPATIENT)
Dept: UROLOGY | Age: 88
End: 2023-05-01
Payer: MEDICARE

## 2023-05-01 VITALS — HEIGHT: 68 IN | WEIGHT: 214 LBS | BODY MASS INDEX: 32.43 KG/M2 | TEMPERATURE: 97.3 F

## 2023-05-01 DIAGNOSIS — R31.0 GROSS HEMATURIA: ICD-10-CM

## 2023-05-01 DIAGNOSIS — N40.1 BPH WITH OBSTRUCTION/LOWER URINARY TRACT SYMPTOMS: Primary | ICD-10-CM

## 2023-05-01 DIAGNOSIS — C67.5 MALIGNANT NEOPLASM OF URINARY BLADDER NECK (HCC): ICD-10-CM

## 2023-05-01 DIAGNOSIS — N13.8 BPH WITH OBSTRUCTION/LOWER URINARY TRACT SYMPTOMS: Primary | ICD-10-CM

## 2023-05-01 LAB
BACTERIA URINE, POC: 0
BILIRUBIN URINE: 0 MG/DL
BLOOD, URINE: NEGATIVE
CASTS URINE, POC: 0
CLARITY: CLEAR
COLOR: NORMAL
CRYSTALS URINE, POC: 0
EPI CELLS URINE, POC: 0
GLUCOSE URINE: NORMAL
KETONES, URINE: NEGATIVE
LEUKOCYTE EST, POC: NORMAL
NITRITE, URINE: NEGATIVE
PH UA: 5 (ref 4.5–8)
PROTEIN UA: NEGATIVE
RBC URINE, POC: 0
SPECIFIC GRAVITY UA: 1.02 (ref 1–1.03)
UROBILINOGEN, URINE: NORMAL
WBC URINE, POC: NORMAL
YEAST URINE, POC: 0

## 2023-05-01 PROCEDURE — 52000 CYSTOURETHROSCOPY: CPT | Performed by: UROLOGY

## 2023-05-01 PROCEDURE — 99214 OFFICE O/P EST MOD 30 MIN: CPT | Performed by: UROLOGY

## 2023-05-01 PROCEDURE — 1123F ACP DISCUSS/DSCN MKR DOCD: CPT | Performed by: UROLOGY

## 2023-05-01 PROCEDURE — 81001 URINALYSIS AUTO W/SCOPE: CPT | Performed by: UROLOGY

## 2023-05-01 PROCEDURE — G8427 DOCREV CUR MEDS BY ELIG CLIN: HCPCS | Performed by: UROLOGY

## 2023-05-01 PROCEDURE — G8417 CALC BMI ABV UP PARAM F/U: HCPCS | Performed by: UROLOGY

## 2023-05-01 PROCEDURE — 99999 CYSTOSCOPY: CPT | Performed by: UROLOGY

## 2023-05-01 PROCEDURE — 1036F TOBACCO NON-USER: CPT | Performed by: UROLOGY

## 2023-05-01 RX ORDER — FINASTERIDE 5 MG/1
5 TABLET, FILM COATED ORAL DAILY
Qty: 30 TABLET | Refills: 11 | Status: SHIPPED | OUTPATIENT
Start: 2023-05-01

## 2023-05-01 ASSESSMENT — ENCOUNTER SYMPTOMS
VOMITING: 0
ABDOMINAL PAIN: 0
ABDOMINAL DISTENTION: 0
BACK PAIN: 0
NAUSEA: 0

## 2023-05-01 NOTE — PROGRESS NOTES
Mega Stringer is a 81 y.o. male who presents today   Chief Complaint   Patient presents with    Cystoscopy     I am here today for cysto due to blood in my urine. Gross Hematuria:  Patient is here today for gross hematuria which occurred 2 month(s) ago. Since last office visit the patient's gross hematuria is: gone. Microhematuria? No  Previous imaging results: CT urogram done 3/30/2023 patient shows normal kidneys bilaterally except for renal cyst no stones no enhancing solid masses collecting system appears normal with no defects. He had prostatomegaly. Previous cystoscopy? no he is here for cystoscopy today   Lower urinary tract symptoms: urgency, frequency, hesitancy, decreased urinary stream, nocturia, 3 times per night, and due to incomplete emptying and straining. His last seen by nurse practitioner. AUA symptom score is 21 On tamsulosin which did help. He also had been treated for UTI. He has a history of tobacco use and smoking 40-pack-year. He also is on Plavix and aspirin for CAD      Past Medical History:   Diagnosis Date    Benign hypertension     BPH (benign prostatic hyperplasia)     CAD (coronary artery disease)     Bypass Graft---LIMA grafted to the LAD and individual saphenous vein graft placed to a diagonal branch and a saphenous vein Y-graft to the first and second obtuse marginal branches. Cancer ([address]     COPD (chronic obstructive pulmonary disease) (HCC)     Fracture of T12 vertebra (HCC)     Hyperglycemia     Hyperlipidemia     Hypertension     LONG TERM ANTICOAGULENT USE        Past Surgical History:   Procedure Laterality Date    CARDIAC CATHETERIZATION  4/25/04, 5/25/04    LEFT Heart Cath, see scanned report    CARDIAC CATHETERIZATION  2/8/01, 6/9/03    LEFT Heart Cath, see scanned report    CARDIAC CATHETERIZATION  8/5/15  MDL    balloon angioplasty of in-stent stenosis of RCA    CARDIAC CATHETERIZATION  10/15/15  MDL    stent to RCA.  EF 55%    CARDIOVASCULAR STRESS TEST

## 2023-05-02 ENCOUNTER — TELEPHONE (OUTPATIENT)
Dept: CARDIOLOGY CLINIC | Age: 88
End: 2023-05-02

## 2023-05-02 NOTE — TELEPHONE ENCOUNTER
Date: 5/11/23    Cardiologist: Tiffanie Uribe    Procedure: TURBT    Surgeon: Ashvin Ferguson    Last Office Visit: 10/12/22  Reason for office visit and medical concerns addressed at this office visit: cad, htn, hyperlipidemia,     Testing Performed and Date of Service:  9/9/21 Celia Impression:  There is small area of apical anteroseptal infarct/ischemia versus  artifact. Suggest: Similar area of defect noted on Lexiscan study from  10/20/2018 with normal EF. Clinical correlation is advised. RCRI = 0.9   METs 4    Current Medications: finasteride, flomax, ramipril, imdur, potassium, plavix, simvastatin, bumex, perocet, coreg, levothyroxine, zyrtec, aspirin,     Is the patient currently taking an anticoagulant? If so, what is the diagnosis the patient has been given to warrant the need for the anticoagulant?  Plavix and aspirin    Additional Notes: requesting cardiac clearance and to hold aspirin and plavix

## 2023-05-02 NOTE — TELEPHONE ENCOUNTER
Yen Scanlon to cardiac risk stratify and hold ASA/Plavix as requested pre-operatively from a cardiac standpoint.   Thanks [employer]

## 2023-05-15 ENCOUNTER — TELEPHONE (OUTPATIENT)
Dept: UROLOGY | Age: 88
End: 2023-05-15

## 2023-05-15 ENCOUNTER — HOSPITAL ENCOUNTER (OUTPATIENT)
Dept: PREADMISSION TESTING | Age: 88
Discharge: HOME | End: 2023-05-19
Payer: MEDICARE

## 2023-05-15 VITALS — WEIGHT: 212 LBS | HEIGHT: 68 IN | BODY MASS INDEX: 32.13 KG/M2

## 2023-05-15 LAB
ALLENS TEST: ABNORMAL
ANION GAP SERPL CALCULATED.3IONS-SCNC: 8 MMOL/L (ref 7–19)
APTT PPP: 30.8 SEC (ref 26–36.2)
BASE EXCESS ARTERIAL: 1 MMOL/L (ref -2–2)
BASOPHILS # BLD: 0.1 K/UL (ref 0–0.2)
BASOPHILS NFR BLD: 1.2 % (ref 0–1)
BUN SERPL-MCNC: 18 MG/DL (ref 8–23)
CALCIUM SERPL-MCNC: 9.7 MG/DL (ref 8.8–10.2)
CARBOXYHEMOGLOBIN ARTERIAL: 1.6 % (ref 0–5)
CHLORIDE SERPL-SCNC: 105 MMOL/L (ref 98–111)
CO2 SERPL-SCNC: 26 MMOL/L (ref 22–29)
CREAT SERPL-MCNC: 1.3 MG/DL (ref 0.5–1.2)
EKG P AXIS: NORMAL DEGREES
EKG P-R INTERVAL: NORMAL MS
EKG Q-T INTERVAL: 462 MS
EKG QRS DURATION: 158 MS
EKG QTC CALCULATION (BAZETT): 459 MS
EKG T AXIS: 86 DEGREES
EOSINOPHIL # BLD: 0.1 K/UL (ref 0–0.6)
EOSINOPHIL NFR BLD: 1.9 % (ref 0–5)
ERYTHROCYTE [DISTWIDTH] IN BLOOD BY AUTOMATED COUNT: 13.1 % (ref 11.5–14.5)
GLUCOSE SERPL-MCNC: 90 MG/DL (ref 74–109)
HCO3 ARTERIAL: 25.2 MMOL/L (ref 22–26)
HCT VFR BLD AUTO: 35.8 % (ref 42–52)
HEMOGLOBIN, ART, EXTENDED: 12.2 G/DL (ref 14–18)
HGB BLD-MCNC: 12 G/DL (ref 14–18)
IMM GRANULOCYTES # BLD: 0 K/UL
INR PPP: 1.07 (ref 0.88–1.18)
LYMPHOCYTES # BLD: 1.5 K/UL (ref 1.1–4.5)
LYMPHOCYTES NFR BLD: 20.1 % (ref 20–40)
MCH RBC QN AUTO: 31.6 PG (ref 27–31)
MCHC RBC AUTO-ENTMCNC: 33.5 G/DL (ref 33–37)
MCV RBC AUTO: 94.2 FL (ref 80–94)
METHEMOGLOBIN ARTERIAL: 1.2 %
MONOCYTES # BLD: 0.7 K/UL (ref 0–0.9)
MONOCYTES NFR BLD: 10 % (ref 0–10)
NEUTROPHILS # BLD: 4.9 K/UL (ref 1.5–7.5)
NEUTS SEG NFR BLD: 66.3 % (ref 50–65)
O2 CONTENT ARTERIAL: 16.6 ML/DL
O2 DELIVERY DEVICE: ABNORMAL
O2 SAT, ARTERIAL: 96 %
O2 THERAPY: ABNORMAL
OXYGEN FLOW: 2
PCO2 ARTERIAL: 38 MMHG (ref 35–45)
PH ARTERIAL: 7.43 (ref 7.35–7.45)
PLATELET # BLD AUTO: 155 K/UL (ref 130–400)
PMV BLD AUTO: 11.6 FL (ref 9.4–12.4)
PO2 ARTERIAL: 88 MMHG (ref 80–100)
POTASSIUM BLD-SCNC: 4.4 MMOL/L
POTASSIUM SERPL-SCNC: 4.3 MMOL/L (ref 3.5–5)
PROTHROMBIN TIME: 13.5 SEC (ref 12–14.6)
RBC # BLD AUTO: 3.8 M/UL (ref 4.7–6.1)
SAMPLE SOURCE: ABNORMAL
SODIUM SERPL-SCNC: 139 MMOL/L (ref 136–145)
WBC # BLD AUTO: 7.4 K/UL (ref 4.8–10.8)

## 2023-05-15 PROCEDURE — 36600 WITHDRAWAL OF ARTERIAL BLOOD: CPT

## 2023-05-15 PROCEDURE — 80048 BASIC METABOLIC PNL TOTAL CA: CPT

## 2023-05-15 PROCEDURE — 85730 THROMBOPLASTIN TIME PARTIAL: CPT

## 2023-05-15 PROCEDURE — 85610 PROTHROMBIN TIME: CPT

## 2023-05-15 PROCEDURE — 85025 COMPLETE CBC W/AUTO DIFF WBC: CPT

## 2023-05-15 PROCEDURE — 93005 ELECTROCARDIOGRAM TRACING: CPT | Performed by: UROLOGY

## 2023-05-15 PROCEDURE — 93010 ELECTROCARDIOGRAM REPORT: CPT | Performed by: INTERNAL MEDICINE

## 2023-05-15 PROCEDURE — 82803 BLOOD GASES ANY COMBINATION: CPT

## 2023-05-15 RX ORDER — LEVOFLOXACIN 5 MG/ML
500 INJECTION, SOLUTION INTRAVENOUS ONCE
OUTPATIENT
Start: 2023-05-24

## 2023-05-15 NOTE — TELEPHONE ENCOUNTER
I talked to the patient's daughter to address her concerns. She verbalized multiple times that she was very uneasy about him being put to sleep. She was wanting to see if Dr. Sumi Fulton would do a urolift while he was asleep. I told her that unfortunately we do not do that procedure here, so we are unable to. She was concerned about his symptoms even after this surgery. I verbalized that he had Proscar added to his medication in hopes of helping to manage the BPH. The patient daughter verbalized understanding.

## 2023-05-15 NOTE — DISCHARGE INSTRUCTIONS
PREOPERATIVE GUIDELINES WHEN RECEIVING ANESTHESIA    Do not eat or drink anything after midnight, the night before your surgery. No gum or candy the morning of surgery. This is extremely important for your safety. Take a bath (or shower) the night before your surgery and you may brush your teeth the morning of your surgery. You will be scheduled to arrive at the hospital 2 hours before your surgery, or follow your surgeon's instructions. Dress comfortably. Wear loose clothing that will be easy to remove and comfortable for your trip home. You may wear eyeglasses or contacts but bring your cases with you as they must be remove before your surgery. Hearing aids and dentures will need to be removed before your surgery. Do not wear any jewelry, including body jewelry. All jewelry will need to be removed prior to your surgery. Do not wear fingernail polish or make-up. It is best not to bring any valuables with you. If you are to stay in the hospital overnight, bring your robe, slippers and personal toiletries that you may need. POSTOPERATIVE GUIDELINES AFTER RECEIVING ANESTHESIA    If you are to go home after your surgery, you will need a responsible adult to drive you home. You will not be able to take public transportation after your discharge from the Operative Care Unit unless you are accompanied by a        responsible adult. On returning home, be sure to follow your physician's orders regarding diet, activity and medications. Remember, surgery with general anesthesia or sedation may leave you sleepy, very tired and with a decreased appetite for 12 to 24 hours. If you develop any post-surgical complications or problems, call your surgeon or [facility] Emergency Department ([phone]). The day before surgery you will receive a phone call from the surgery nurse to let you know what time to arrive on the day of surgery.  This call will usually be between 2-4 PM. If

## 2023-05-15 NOTE — TELEPHONE ENCOUNTER
Patients daughter called in regard to patients upcoming surgery on 5/24. Patients daughter has some questions for the nurse and would like her to return her call.

## 2023-05-24 ENCOUNTER — ANESTHESIA EVENT (OUTPATIENT)
Dept: OPERATING ROOM | Age: 88
End: 2023-05-24
Payer: MEDICARE

## 2023-05-24 ENCOUNTER — HOSPITAL ENCOUNTER (OUTPATIENT)
Age: 88
Setting detail: OBSERVATION
Discharge: HOME | End: 2023-05-25
Attending: UROLOGY | Admitting: UROLOGY
Payer: MEDICARE

## 2023-05-24 ENCOUNTER — ANESTHESIA (OUTPATIENT)
Dept: OPERATING ROOM | Age: 88
End: 2023-05-24
Payer: MEDICARE

## 2023-05-24 DIAGNOSIS — I25.10 CORONARY ARTERY DISEASE INVOLVING NATIVE HEART WITHOUT ANGINA PECTORIS, UNSPECIFIED VESSEL OR LESION TYPE: ICD-10-CM

## 2023-05-24 DIAGNOSIS — C67.5 MALIGNANT NEOPLASM OF URINARY BLADDER NECK (HCC): Primary | ICD-10-CM

## 2023-05-24 DIAGNOSIS — C67.5 CANCER OF BLADDER NECK (HCC): ICD-10-CM

## 2023-05-24 LAB
GLUCOSE BLD-MCNC: 147 MG/DL (ref 70–99)
GLUCOSE BLD-MCNC: 178 MG/DL (ref 70–99)
GLUCOSE BLD-MCNC: 222 MG/DL (ref 70–99)
GLUCOSE BLD-MCNC: 230 MG/DL (ref 70–99)
PERFORMED ON: ABNORMAL

## 2023-05-24 PROCEDURE — G0378 HOSPITAL OBSERVATION PER HR: HCPCS

## 2023-05-24 PROCEDURE — 7100000001 HC PACU RECOVERY - ADDTL 15 MIN: Performed by: UROLOGY

## 2023-05-24 PROCEDURE — 2580000003 HC RX 258: Performed by: ANESTHESIOLOGY

## 2023-05-24 PROCEDURE — 52235 CYSTOSCOPY AND TREATMENT: CPT | Performed by: UROLOGY

## 2023-05-24 PROCEDURE — 2580000003 HC RX 258: Performed by: UROLOGY

## 2023-05-24 PROCEDURE — 88307 TISSUE EXAM BY PATHOLOGIST: CPT

## 2023-05-24 PROCEDURE — 3600000014 HC SURGERY LEVEL 4 ADDTL 15MIN: Performed by: UROLOGY

## 2023-05-24 PROCEDURE — 94150 VITAL CAPACITY TEST: CPT

## 2023-05-24 PROCEDURE — 2709999900 HC NON-CHARGEABLE SUPPLY: Performed by: UROLOGY

## 2023-05-24 PROCEDURE — 82962 GLUCOSE BLOOD TEST: CPT

## 2023-05-24 PROCEDURE — 3700000001 HC ADD 15 MINUTES (ANESTHESIA): Performed by: UROLOGY

## 2023-05-24 PROCEDURE — 7100000000 HC PACU RECOVERY - FIRST 15 MIN: Performed by: UROLOGY

## 2023-05-24 PROCEDURE — 6360000002 HC RX W HCPCS: Performed by: UROLOGY

## 2023-05-24 PROCEDURE — 2500000003 HC RX 250 WO HCPCS: Performed by: NURSE ANESTHETIST, CERTIFIED REGISTERED

## 2023-05-24 PROCEDURE — 6370000000 HC RX 637 (ALT 250 FOR IP): Performed by: UROLOGY

## 2023-05-24 PROCEDURE — 6360000002 HC RX W HCPCS: Performed by: NURSE ANESTHETIST, CERTIFIED REGISTERED

## 2023-05-24 PROCEDURE — 3600000004 HC SURGERY LEVEL 4 BASE: Performed by: UROLOGY

## 2023-05-24 PROCEDURE — 3700000000 HC ANESTHESIA ATTENDED CARE: Performed by: UROLOGY

## 2023-05-24 PROCEDURE — 6360000002 HC RX W HCPCS: Performed by: ANESTHESIOLOGY

## 2023-05-24 RX ORDER — FINASTERIDE 5 MG/1
5 TABLET, FILM COATED ORAL DAILY
Status: DISCONTINUED | OUTPATIENT
Start: 2023-05-24 | End: 2023-05-25 | Stop reason: HOSPADM

## 2023-05-24 RX ORDER — MORPHINE SULFATE 4 MG/ML
4 INJECTION, SOLUTION INTRAMUSCULAR; INTRAVENOUS EVERY 5 MIN PRN
Status: DISCONTINUED | OUTPATIENT
Start: 2023-05-24 | End: 2023-05-24 | Stop reason: HOSPADM

## 2023-05-24 RX ORDER — DIPHENHYDRAMINE HYDROCHLORIDE 50 MG/ML
12.5 INJECTION INTRAMUSCULAR; INTRAVENOUS
Status: DISCONTINUED | OUTPATIENT
Start: 2023-05-24 | End: 2023-05-24 | Stop reason: HOSPADM

## 2023-05-24 RX ORDER — NITROGLYCERIN 0.4 MG/1
0.4 TABLET SUBLINGUAL EVERY 5 MIN PRN
Status: DISCONTINUED | OUTPATIENT
Start: 2023-05-24 | End: 2023-05-25 | Stop reason: HOSPADM

## 2023-05-24 RX ORDER — ISOSORBIDE MONONITRATE 30 MG/1
30 TABLET, EXTENDED RELEASE ORAL NIGHTLY
Status: DISCONTINUED | OUTPATIENT
Start: 2023-05-24 | End: 2023-05-25 | Stop reason: HOSPADM

## 2023-05-24 RX ORDER — LEVOFLOXACIN 5 MG/ML
250 INJECTION, SOLUTION INTRAVENOUS EVERY 24 HOURS
Status: DISCONTINUED | OUTPATIENT
Start: 2023-05-25 | End: 2023-05-25 | Stop reason: HOSPADM

## 2023-05-24 RX ORDER — SODIUM CHLORIDE 9 MG/ML
INJECTION, SOLUTION INTRAVENOUS PRN
Status: DISCONTINUED | OUTPATIENT
Start: 2023-05-24 | End: 2023-05-24 | Stop reason: HOSPADM

## 2023-05-24 RX ORDER — PANTOPRAZOLE SODIUM 40 MG/1
40 TABLET, DELAYED RELEASE ORAL
Status: DISCONTINUED | OUTPATIENT
Start: 2023-05-25 | End: 2023-05-25 | Stop reason: HOSPADM

## 2023-05-24 RX ORDER — IPRATROPIUM BROMIDE AND ALBUTEROL SULFATE 2.5; .5 MG/3ML; MG/3ML
1 SOLUTION RESPIRATORY (INHALATION)
Status: DISCONTINUED | OUTPATIENT
Start: 2023-05-24 | End: 2023-05-24 | Stop reason: HOSPADM

## 2023-05-24 RX ORDER — HYDROMORPHONE HYDROCHLORIDE 1 MG/ML
0.5 INJECTION, SOLUTION INTRAMUSCULAR; INTRAVENOUS; SUBCUTANEOUS
Status: DISCONTINUED | OUTPATIENT
Start: 2023-05-24 | End: 2023-05-25 | Stop reason: HOSPADM

## 2023-05-24 RX ORDER — DEXTROSE MONOHYDRATE 100 MG/ML
INJECTION, SOLUTION INTRAVENOUS CONTINUOUS PRN
Status: DISCONTINUED | OUTPATIENT
Start: 2023-05-24 | End: 2023-05-25 | Stop reason: HOSPADM

## 2023-05-24 RX ORDER — LORAZEPAM 2 MG/ML
0.5 INJECTION INTRAMUSCULAR
Status: DISCONTINUED | OUTPATIENT
Start: 2023-05-24 | End: 2023-05-24 | Stop reason: HOSPADM

## 2023-05-24 RX ORDER — FAMOTIDINE 20 MG/1
20 TABLET, FILM COATED ORAL ONCE
Status: DISCONTINUED | OUTPATIENT
Start: 2023-05-24 | End: 2023-05-24 | Stop reason: HOSPADM

## 2023-05-24 RX ORDER — LEVOFLOXACIN 5 MG/ML
500 INJECTION, SOLUTION INTRAVENOUS EVERY 24 HOURS
Status: DISCONTINUED | OUTPATIENT
Start: 2023-05-25 | End: 2023-05-24 | Stop reason: DRUGHIGH

## 2023-05-24 RX ORDER — LEVOTHYROXINE SODIUM 0.03 MG/1
25 TABLET ORAL DAILY
Status: DISCONTINUED | OUTPATIENT
Start: 2023-05-24 | End: 2023-05-25 | Stop reason: HOSPADM

## 2023-05-24 RX ORDER — LIDOCAINE HYDROCHLORIDE 10 MG/ML
1 INJECTION, SOLUTION EPIDURAL; INFILTRATION; INTRACAUDAL; PERINEURAL
Status: DISCONTINUED | OUTPATIENT
Start: 2023-05-24 | End: 2023-05-24 | Stop reason: HOSPADM

## 2023-05-24 RX ORDER — DEXAMETHASONE SODIUM PHOSPHATE 10 MG/ML
INJECTION, SOLUTION INTRAMUSCULAR; INTRAVENOUS PRN
Status: DISCONTINUED | OUTPATIENT
Start: 2023-05-24 | End: 2023-05-24 | Stop reason: SDUPTHER

## 2023-05-24 RX ORDER — ONDANSETRON 2 MG/ML
INJECTION INTRAMUSCULAR; INTRAVENOUS PRN
Status: DISCONTINUED | OUTPATIENT
Start: 2023-05-24 | End: 2023-05-24 | Stop reason: SDUPTHER

## 2023-05-24 RX ORDER — FENTANYL CITRATE 50 UG/ML
INJECTION, SOLUTION INTRAMUSCULAR; INTRAVENOUS PRN
Status: DISCONTINUED | OUTPATIENT
Start: 2023-05-24 | End: 2023-05-24 | Stop reason: SDUPTHER

## 2023-05-24 RX ORDER — LISINOPRIL 20 MG/1
40 TABLET ORAL DAILY
Status: DISCONTINUED | OUTPATIENT
Start: 2023-05-24 | End: 2023-05-25 | Stop reason: HOSPADM

## 2023-05-24 RX ORDER — METOCLOPRAMIDE HYDROCHLORIDE 5 MG/ML
10 INJECTION INTRAMUSCULAR; INTRAVENOUS
Status: DISCONTINUED | OUTPATIENT
Start: 2023-05-24 | End: 2023-05-24 | Stop reason: HOSPADM

## 2023-05-24 RX ORDER — SODIUM CHLORIDE, SODIUM LACTATE, POTASSIUM CHLORIDE, CALCIUM CHLORIDE 600; 310; 30; 20 MG/100ML; MG/100ML; MG/100ML; MG/100ML
INJECTION, SOLUTION INTRAVENOUS CONTINUOUS
Status: DISCONTINUED | OUTPATIENT
Start: 2023-05-24 | End: 2023-05-24 | Stop reason: HOSPADM

## 2023-05-24 RX ORDER — ALBUTEROL SULFATE 90 UG/1
2 AEROSOL, METERED RESPIRATORY (INHALATION) EVERY 4 HOURS PRN
Status: DISCONTINUED | OUTPATIENT
Start: 2023-05-24 | End: 2023-05-25 | Stop reason: HOSPADM

## 2023-05-24 RX ORDER — CETIRIZINE HYDROCHLORIDE 10 MG/1
10 TABLET ORAL DAILY
Status: DISCONTINUED | OUTPATIENT
Start: 2023-05-24 | End: 2023-05-25 | Stop reason: HOSPADM

## 2023-05-24 RX ORDER — ONDANSETRON 4 MG/1
4 TABLET, ORALLY DISINTEGRATING ORAL EVERY 8 HOURS PRN
Status: DISCONTINUED | OUTPATIENT
Start: 2023-05-24 | End: 2023-05-25 | Stop reason: HOSPADM

## 2023-05-24 RX ORDER — SODIUM CHLORIDE 0.9 % (FLUSH) 0.9 %
5-40 SYRINGE (ML) INJECTION PRN
Status: DISCONTINUED | OUTPATIENT
Start: 2023-05-24 | End: 2023-05-25 | Stop reason: HOSPADM

## 2023-05-24 RX ORDER — GLIPIZIDE 5 MG/1
2.5 TABLET ORAL
Status: DISCONTINUED | OUTPATIENT
Start: 2023-05-25 | End: 2023-05-25 | Stop reason: HOSPADM

## 2023-05-24 RX ORDER — SODIUM CHLORIDE 9 MG/ML
INJECTION, SOLUTION INTRAVENOUS CONTINUOUS
Status: DISCONTINUED | OUTPATIENT
Start: 2023-05-24 | End: 2023-05-25 | Stop reason: HOSPADM

## 2023-05-24 RX ORDER — MIDAZOLAM HYDROCHLORIDE 2 MG/2ML
2 INJECTION, SOLUTION INTRAMUSCULAR; INTRAVENOUS
Status: DISCONTINUED | OUTPATIENT
Start: 2023-05-24 | End: 2023-05-24 | Stop reason: HOSPADM

## 2023-05-24 RX ORDER — TAMSULOSIN HYDROCHLORIDE 0.4 MG/1
0.4 CAPSULE ORAL DAILY
Status: DISCONTINUED | OUTPATIENT
Start: 2023-05-24 | End: 2023-05-25 | Stop reason: HOSPADM

## 2023-05-24 RX ORDER — FLUTICASONE PROPIONATE 50 MCG
2 SPRAY, SUSPENSION (ML) NASAL DAILY
Status: DISCONTINUED | OUTPATIENT
Start: 2023-05-24 | End: 2023-05-25 | Stop reason: HOSPADM

## 2023-05-24 RX ORDER — SODIUM CHLORIDE 0.9 % (FLUSH) 0.9 %
5-40 SYRINGE (ML) INJECTION PRN
Status: DISCONTINUED | OUTPATIENT
Start: 2023-05-24 | End: 2023-05-24 | Stop reason: HOSPADM

## 2023-05-24 RX ORDER — ONDANSETRON 2 MG/ML
4 INJECTION INTRAMUSCULAR; INTRAVENOUS EVERY 6 HOURS PRN
Status: DISCONTINUED | OUTPATIENT
Start: 2023-05-24 | End: 2023-05-25 | Stop reason: HOSPADM

## 2023-05-24 RX ORDER — ROCURONIUM BROMIDE 10 MG/ML
INJECTION, SOLUTION INTRAVENOUS PRN
Status: DISCONTINUED | OUTPATIENT
Start: 2023-05-24 | End: 2023-05-24 | Stop reason: SDUPTHER

## 2023-05-24 RX ORDER — BUMETANIDE 1 MG/1
0.5 TABLET ORAL DAILY
Status: DISCONTINUED | OUTPATIENT
Start: 2023-05-24 | End: 2023-05-25 | Stop reason: HOSPADM

## 2023-05-24 RX ORDER — PHENAZOPYRIDINE HYDROCHLORIDE 100 MG/1
100 TABLET, FILM COATED ORAL ONCE
Status: COMPLETED | OUTPATIENT
Start: 2023-05-24 | End: 2023-05-24

## 2023-05-24 RX ORDER — THEOPHYLLINE 300 MG/1
300 TABLET, EXTENDED RELEASE ORAL EVERY 8 HOURS
Status: DISCONTINUED | OUTPATIENT
Start: 2023-05-24 | End: 2023-05-25 | Stop reason: HOSPADM

## 2023-05-24 RX ORDER — CARVEDILOL 12.5 MG/1
12.5 TABLET ORAL 2 TIMES DAILY
Status: DISCONTINUED | OUTPATIENT
Start: 2023-05-24 | End: 2023-05-25 | Stop reason: HOSPADM

## 2023-05-24 RX ORDER — LIDOCAINE HYDROCHLORIDE 10 MG/ML
INJECTION, SOLUTION EPIDURAL; INFILTRATION; INTRACAUDAL; PERINEURAL PRN
Status: DISCONTINUED | OUTPATIENT
Start: 2023-05-24 | End: 2023-05-24 | Stop reason: SDUPTHER

## 2023-05-24 RX ORDER — BISACODYL 10 MG
10 SUPPOSITORY, RECTAL RECTAL DAILY PRN
Status: DISCONTINUED | OUTPATIENT
Start: 2023-05-24 | End: 2023-05-25 | Stop reason: HOSPADM

## 2023-05-24 RX ORDER — SODIUM CHLORIDE 9 MG/ML
INJECTION, SOLUTION INTRAVENOUS PRN
Status: DISCONTINUED | OUTPATIENT
Start: 2023-05-24 | End: 2023-05-25 | Stop reason: HOSPADM

## 2023-05-24 RX ORDER — SODIUM CHLORIDE 0.9 % (FLUSH) 0.9 %
5-40 SYRINGE (ML) INJECTION EVERY 12 HOURS SCHEDULED
Status: DISCONTINUED | OUTPATIENT
Start: 2023-05-24 | End: 2023-05-24 | Stop reason: HOSPADM

## 2023-05-24 RX ORDER — PROPOFOL 10 MG/ML
INJECTION, EMULSION INTRAVENOUS PRN
Status: DISCONTINUED | OUTPATIENT
Start: 2023-05-24 | End: 2023-05-24 | Stop reason: SDUPTHER

## 2023-05-24 RX ORDER — M-VIT,TX,IRON,MINS/CALC/FOLIC 27MG-0.4MG
1 TABLET ORAL DAILY
Status: DISCONTINUED | OUTPATIENT
Start: 2023-05-24 | End: 2023-05-25 | Stop reason: HOSPADM

## 2023-05-24 RX ORDER — OXYCODONE HYDROCHLORIDE AND ACETAMINOPHEN 5; 325 MG/1; MG/1
1 TABLET ORAL EVERY 6 HOURS PRN
Status: DISCONTINUED | OUTPATIENT
Start: 2023-05-24 | End: 2023-05-25 | Stop reason: HOSPADM

## 2023-05-24 RX ORDER — SODIUM CHLORIDE 0.9 % (FLUSH) 0.9 %
5-40 SYRINGE (ML) INJECTION EVERY 12 HOURS SCHEDULED
Status: DISCONTINUED | OUTPATIENT
Start: 2023-05-24 | End: 2023-05-25 | Stop reason: HOSPADM

## 2023-05-24 RX ORDER — DROPERIDOL 2.5 MG/ML
0.62 INJECTION, SOLUTION INTRAMUSCULAR; INTRAVENOUS
Status: DISCONTINUED | OUTPATIENT
Start: 2023-05-24 | End: 2023-05-24 | Stop reason: HOSPADM

## 2023-05-24 RX ORDER — SCOLOPAMINE TRANSDERMAL SYSTEM 1 MG/1
1 PATCH, EXTENDED RELEASE TRANSDERMAL
Status: DISCONTINUED | OUTPATIENT
Start: 2023-05-24 | End: 2023-05-24 | Stop reason: HOSPADM

## 2023-05-24 RX ORDER — ATORVASTATIN CALCIUM 20 MG/1
20 TABLET, FILM COATED ORAL DAILY
Status: DISCONTINUED | OUTPATIENT
Start: 2023-05-24 | End: 2023-05-25 | Stop reason: HOSPADM

## 2023-05-24 RX ORDER — MORPHINE SULFATE 2 MG/ML
2 INJECTION, SOLUTION INTRAMUSCULAR; INTRAVENOUS EVERY 5 MIN PRN
Status: DISCONTINUED | OUTPATIENT
Start: 2023-05-24 | End: 2023-05-24 | Stop reason: HOSPADM

## 2023-05-24 RX ORDER — POLYETHYLENE GLYCOL 3350 17 G/17G
17 POWDER, FOR SOLUTION ORAL DAILY PRN
Status: DISCONTINUED | OUTPATIENT
Start: 2023-05-24 | End: 2023-05-25 | Stop reason: HOSPADM

## 2023-05-24 RX ORDER — TRAMADOL HYDROCHLORIDE 50 MG/1
50 TABLET ORAL EVERY 6 HOURS PRN
Status: DISCONTINUED | OUTPATIENT
Start: 2023-05-24 | End: 2023-05-25 | Stop reason: HOSPADM

## 2023-05-24 RX ORDER — LABETALOL HYDROCHLORIDE 5 MG/ML
10 INJECTION, SOLUTION INTRAVENOUS
Status: DISCONTINUED | OUTPATIENT
Start: 2023-05-24 | End: 2023-05-24 | Stop reason: HOSPADM

## 2023-05-24 RX ORDER — LEVOFLOXACIN 5 MG/ML
500 INJECTION, SOLUTION INTRAVENOUS ONCE
Status: COMPLETED | OUTPATIENT
Start: 2023-05-24 | End: 2023-05-24

## 2023-05-24 RX ORDER — POTASSIUM CHLORIDE 20 MEQ/1
20 TABLET, EXTENDED RELEASE ORAL DAILY
Status: DISCONTINUED | OUTPATIENT
Start: 2023-05-24 | End: 2023-05-25 | Stop reason: HOSPADM

## 2023-05-24 RX ADMIN — ROCURONIUM BROMIDE 20 MG: 10 INJECTION, SOLUTION INTRAVENOUS at 08:23

## 2023-05-24 RX ADMIN — PHENAZOPYRIDINE HYDROCHLORIDE 100 MG: 100 TABLET, FILM COATED ORAL at 09:56

## 2023-05-24 RX ADMIN — CETIRIZINE HYDROCHLORIDE 10 MG: 10 TABLET, FILM COATED ORAL at 11:27

## 2023-05-24 RX ADMIN — LISINOPRIL 40 MG: 20 TABLET ORAL at 11:28

## 2023-05-24 RX ADMIN — HYOSCYAMINE SULFATE 125 MCG: 0.12 TABLET ORAL; SUBLINGUAL at 09:56

## 2023-05-24 RX ADMIN — LEVOFLOXACIN 500 MG: 500 INJECTION, SOLUTION INTRAVENOUS at 08:10

## 2023-05-24 RX ADMIN — PROPOFOL 100 MG: 10 INJECTION, EMULSION INTRAVENOUS at 08:05

## 2023-05-24 RX ADMIN — FENTANYL CITRATE 50 MCG: 0.05 INJECTION, SOLUTION INTRAMUSCULAR; INTRAVENOUS at 08:32

## 2023-05-24 RX ADMIN — TAMSULOSIN HYDROCHLORIDE 0.4 MG: 0.4 CAPSULE ORAL at 21:26

## 2023-05-24 RX ADMIN — ATORVASTATIN CALCIUM 20 MG: 20 TABLET, FILM COATED ORAL at 21:27

## 2023-05-24 RX ADMIN — DEXAMETHASONE SODIUM PHOSPHATE 10 MG: 10 INJECTION, SOLUTION INTRAMUSCULAR; INTRAVENOUS at 08:14

## 2023-05-24 RX ADMIN — LEVOTHYROXINE SODIUM 25 MCG: 25 TABLET ORAL at 21:27

## 2023-05-24 RX ADMIN — THEOPHYLLINE 300 MG: 300 TABLET, EXTENDED RELEASE ORAL at 21:27

## 2023-05-24 RX ADMIN — SODIUM CHLORIDE: 9 INJECTION, SOLUTION INTRAVENOUS at 10:20

## 2023-05-24 RX ADMIN — BUMETANIDE 0.5 MG: 1 TABLET ORAL at 11:26

## 2023-05-24 RX ADMIN — MULTIPLE VITAMINS W/ MINERALS TAB 1 TABLET: TAB at 11:27

## 2023-05-24 RX ADMIN — CARVEDILOL 12.5 MG: 12.5 TABLET, FILM COATED ORAL at 21:27

## 2023-05-24 RX ADMIN — ISOSORBIDE MONONITRATE 30 MG: 30 TABLET, EXTENDED RELEASE ORAL at 21:27

## 2023-05-24 RX ADMIN — TRIAMCINOLONE ACETONIDE: 1 OINTMENT TOPICAL at 21:26

## 2023-05-24 RX ADMIN — MORPHINE SULFATE 2 MG: 2 INJECTION, SOLUTION INTRAMUSCULAR; INTRAVENOUS at 09:28

## 2023-05-24 RX ADMIN — FENTANYL CITRATE 50 MCG: 0.05 INJECTION, SOLUTION INTRAMUSCULAR; INTRAVENOUS at 08:24

## 2023-05-24 RX ADMIN — SODIUM CHLORIDE, POTASSIUM CHLORIDE, SODIUM LACTATE AND CALCIUM CHLORIDE: 600; 310; 30; 20 INJECTION, SOLUTION INTRAVENOUS at 06:54

## 2023-05-24 RX ADMIN — FINASTERIDE 5 MG: 5 TABLET, FILM COATED ORAL at 21:27

## 2023-05-24 RX ADMIN — ONDANSETRON 4 MG: 2 INJECTION INTRAMUSCULAR; INTRAVENOUS at 09:04

## 2023-05-24 RX ADMIN — FENTANYL CITRATE 50 MCG: 0.05 INJECTION, SOLUTION INTRAMUSCULAR; INTRAVENOUS at 08:42

## 2023-05-24 RX ADMIN — MORPHINE SULFATE 2 MG: 2 INJECTION, SOLUTION INTRAMUSCULAR; INTRAVENOUS at 09:34

## 2023-05-24 RX ADMIN — OXYCODONE HYDROCHLORIDE AND ACETAMINOPHEN 1 TABLET: 5; 325 TABLET ORAL at 11:22

## 2023-05-24 RX ADMIN — POTASSIUM CHLORIDE 20 MEQ: 1500 TABLET, EXTENDED RELEASE ORAL at 11:33

## 2023-05-24 RX ADMIN — ROCURONIUM BROMIDE 50 MG: 10 INJECTION, SOLUTION INTRAVENOUS at 08:05

## 2023-05-24 RX ADMIN — LIDOCAINE HYDROCHLORIDE 50 MG: 10 INJECTION, SOLUTION EPIDURAL; INFILTRATION; INTRACAUDAL; PERINEURAL at 08:05

## 2023-05-24 RX ADMIN — SUGAMMADEX 300 MG: 100 INJECTION, SOLUTION INTRAVENOUS at 09:04

## 2023-05-24 RX ADMIN — FENTANYL CITRATE 50 MCG: 0.05 INJECTION, SOLUTION INTRAMUSCULAR; INTRAVENOUS at 08:03

## 2023-05-24 RX ADMIN — THEOPHYLLINE 300 MG: 300 TABLET, EXTENDED RELEASE ORAL at 12:13

## 2023-05-24 RX ADMIN — MORPHINE SULFATE 2 MG: 2 INJECTION, SOLUTION INTRAMUSCULAR; INTRAVENOUS at 09:39

## 2023-05-24 ASSESSMENT — PAIN SCALES - GENERAL
PAINLEVEL_OUTOF10: 4
PAINLEVEL_OUTOF10: 2
PAINLEVEL_OUTOF10: 4
PAINLEVEL_OUTOF10: 5
PAINLEVEL_OUTOF10: 3
PAINLEVEL_OUTOF10: 5
PAINLEVEL_OUTOF10: 3

## 2023-05-24 ASSESSMENT — PAIN DESCRIPTION - LOCATION
LOCATION: PENIS

## 2023-05-24 ASSESSMENT — PAIN DESCRIPTION - DESCRIPTORS: DESCRIPTORS: BURNING

## 2023-05-24 ASSESSMENT — ENCOUNTER SYMPTOMS: SHORTNESS OF BREATH: 1

## 2023-05-24 ASSESSMENT — COPD QUESTIONNAIRES: CAT_SEVERITY: SEVERE

## 2023-05-24 ASSESSMENT — LIFESTYLE VARIABLES: SMOKING_STATUS: 0

## 2023-05-24 NOTE — OP NOTE
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]    Post-Op Diagnosis: Same       Procedure(s):  CYSTOSCOPY TRANSURETHRAL RESECTION BLADDER TUMOR    Surgeon(s):  Addi Shaw MD    Assistant:   * No surgical staff found *    Anesthesia: General    Estimated Blood Loss (mL): 20    Complications: None    Specimens:   ID Type Source Tests Collected by Time Destination   A : BLADDER TUMOR Tissue Bladder SURGICAL PATHOLOGY Addi Shaw MD 5/24/2023 [id number]        Implants:  * No implants in log *      Drains:   Urinary Catheter 05/24/23 3 Way (Active)       Findings: 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 Large lateral lobe prostatic large no significant median lobe. Bleeding from anterior commissure of prostate required fulguration 20 Kosovan three-way 12 cc balloon      Detailed Description of Procedure:   See dictated report:  [id number]    Disposition to PACU admit 23-hour observation on CBI overnight need to home with Aranda catheter.     Electronically signed by Bria Aguero MD on 5/24/2023 at 9:04 AM

## 2023-05-24 NOTE — INTERVAL H&P NOTE
Update History & Physical    The patient's History and Physical of May 1, 2023 was reviewed with the patient and I examined the patient. There was no change. The surgical site was confirmed by the patient and me. Plan: The risks, benefits, expected outcome, and alternative to the recommended procedure have been discussed with the patient. Patient understands and wants to proceed with the procedure.      Electronically signed by Bita King MD on 5/24/2023 at 7:44 AM

## 2023-05-24 NOTE — CARE COORDINATION
Liliana Frazier met with PT and PT daughter, Kevin Urias at bedside to discuss the PT [facility] and to answer any questions/concerns at that time. The Pt and PT daughter did not have any questions/concerns at that time. Pt daughter signed for the Pt on the PT [facility] ELAINE letter and it was filed in the PT soft chart. The  provided the [employer] letter for the Pt and PT daughter for their records.  Electronically signed by Amrit Zhang on 5/24/2023 at 11:53 AM    05/24/23 1149   IMM Letter   Observation Status Letter date given: 05/24/23   Observation Status Letter time given: 1133

## 2023-05-24 NOTE — ANESTHESIA PRE PROCEDURE
Department of Anesthesiology  Preprocedure Note       Name:  Mackenzie Albarran   Age:  81 y.o.  :  1935                                          MRN:  [id number]         Date:  2023      Surgeon: Justin Castaneda):  Jose Rothman MD    Procedure: Procedure(s):  CYSTOSCOPY TRANSURETHRAL RESECTION BLADDER TUMOR    Medications prior to admission:   Prior to Admission medications    Medication Sig Start Date End Date Taking?  Authorizing Provider   finasteride (PROSCAR) 5 MG tablet Take 1 tablet by mouth daily 23   Jose Rothman MD   tamsulosin (FLOMAX) 0.4 MG capsule TAKE 1 CAPSULE BY MOUTH EVERY DAY  Patient taking differently: Take 1 capsule by mouth daily 3/27/23   CHELSIE White CNP   nitroGLYCERIN (NITROSTAT) 0.4 MG SL tablet Place 1 tablet under the tongue every 5 minutes as needed    Historical Provider, MD   ramipril (ALTACE) 10 MG capsule TAKE 1 CAPSULE BY MOUTH EVERY DAY  Patient taking differently: Take 1 capsule by mouth daily 23   CHELSIE Ramirez NP   isosorbide mononitrate (IMDUR) 30 MG extended release tablet TAKE 1 TABLET BY MOUTH EVERY DAY AT NIGHT  Patient taking differently: Take 1 tablet by mouth nightly 22   CHELSIE Reed   KLOR-CON M20 20 MEQ extended release tablet TAKE 1 TABLET BY MOUTH EVERY DAY  Patient taking differently: Take 1 tablet by mouth daily 22   CHELSIE Ramirez NP   clopidogrel (PLAVIX) 75 MG tablet TAKE 1 TABLET BY MOUTH EVERY DAY  Patient taking differently: Take 1 tablet by mouth three times a week Takes  and Fri 10/10/22   CHELSIE Lawrence   simvastatin (ZOCOR) 40 MG tablet TAKE 1 TABLET BY MOUTH EVERY DAY AT NIGHT  Patient taking differently: Take 1 tablet by mouth nightly 22   CHELSIE Ramirez NP   bumetanide (BUMEX) 1 MG tablet TAKE 1 TABLET BY MOUTH EVERY DAY  Patient taking differently: Take 0.5 tablets by mouth daily 22   CHELSIE Scott   ACCU-CHEK CATARINO PLUS strip  21   Historical

## 2023-05-24 NOTE — ANESTHESIA POSTPROCEDURE EVALUATION
Department of Anesthesiology  Postprocedure Note    Patient: Emanuel Lynn  MRN: [id number]  YOB: 1935  Date of evaluation: 5/24/2023      Procedure Summary     Date: 05/24/23 Room / Location: [facility]    Anesthesia Start: 0802 Anesthesia Stop:     Procedure: CYSTOSCOPY TRANSURETHRAL RESECTION BLADDER TUMOR Diagnosis:       Cancer of bladder neck ([address]      (Cancer of bladder neck ([address] [C67.5])    Surgeons: Eldon Escalona MD Responsible Provider: CHELSIE Jimenez CRNA    Anesthesia Type: general ASA Status: 4          Anesthesia Type: No value filed.     Sanjay Phase I: Sanjay Score: 8    Sanjay Phase II:        Anesthesia Post Evaluation    Patient location during evaluation: PACU  Patient participation: complete - patient participated  Level of consciousness: awake  Pain score: 0  Airway patency: patent  Nausea & Vomiting: no nausea and no vomiting  Complications: no  Cardiovascular status: hemodynamically stable  Respiratory status: acceptable, spontaneous ventilation and nasal cannula  Hydration status: euvolemic

## 2023-05-24 NOTE — PROGRESS NOTES
4 Eyes Skin Assessment    Shanti Aaron is being assessed upon: Admission    I agree that I, Eduard Interiano RN, along with Sarah Restrepo LPN have performed a thorough Head to Toe Skin Assessment on the patient. ALL assessment sites listed below have been assessed. Areas assessed by both nurses:     [x]   Head, Face, and Ears   [x]   Shoulders, Back, and Chest  [x]   Arms, Elbows, and Hands   [x]   Coccyx, Sacrum, and Ischium  [x]   Legs, Feet, and Heels    Does the Patient have Skin Breakdown?  No    Roel Prevention initiated: No  Wound Care Orders initiated: No    [facility] nurse consulted for Pressure Injury (Stage 3,4, Unstageable, DTI, NWPT, and Complex wounds) and New or Established Ostomies: No        Primary Nurse eSignature: Eduard Interiano RN on 5/24/2023 at 11:09 AM      Co-Signer eSignature: {Esignature:[id number]}

## 2023-05-24 NOTE — PROGRESS NOTES
Pharmacy Renal Adjustment    Olya Alicea is a 81 y.o. male. Pharmacy has renally adjusted medications per protocol. No results for input(s): BUN in the last 72 hours. No results for input(s): CREATININE in the last 72 hours. Estimated Creatinine Clearance: 46 mL/min (A) (based on SCr of 1.3 mg/dL (H)). Height:   Ht Readings from Last 1 Encounters:   05/24/23 5' 8\" (1.727 m)     Weight:  Wt Readings from Last 1 Encounters:   05/24/23 212 lb (96.2 kg)       CKD stage: Unspecified         Baseline SCr: Unspecified    Plan: Adjust the following medications based on renal function:           Levofloxacin 500 mg IV every 24 hours adjusted to Levofloxacin 250 mg IV every 24 hours.     Electronically signed by Cynthia Aviles [facility] on 5/24/2023 at 2:48 PM

## 2023-05-24 NOTE — OP NOTE
JESSNCLUIS MIGUEL [facility] [facility] [facility]                                OPERATIVE REPORT    PATIENT NAME: Pancho Cowan                     :        1935  MED REC NO:   [id number]                              ROOM:       [facility]  ACCOUNT NO:   [de-identified]                           ADMIT DATE: 2023  PROVIDER:     Cristal Lazo MD    DATE OF PROCEDURE:  2023    TITLE OF OPERATION:  Transurethral resection of bladder tumor. 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 ANESTHETIC:  General anesthetic. ATTENDING SURGEON:  Cristal Lazo MD    ESTIMATED BLOOD LOSS:  20 mL. HISTORY:  The patient is an 52-year-old gentleman who is maintained on  Plavix and aspirin for history of coronary artery disease. He developed  gross hematuria. CT urogram showed normal upper tracts. He has very  large prostate. I performed cystoscopy in the office and this showed a  papillary tumor anterior bladder neck on the intravesical protrusion of  the prostate that is about 2 to 3 cm in size. Therefore, he now  presents to undergo transurethral resection of his bladder tumor. He  does understand given this location we may have to resect a part of  bladder neck or prostate. We discussed the risk of bleeding, infection,  bladder perforation, failure to completely resect the tumor, need for  subsequent adjuvant or repeat procedures. Given his large prostate, we  discussed the risk of urinary retention and need to go home with Aranda  catheter, etc.  He understands and agrees to proceed. DESCRIPTION OF PROCEDURE:  The patient was brought to the operating  room, underwent general anesthetic. He was placed in the lithotomy  position. His genitalia was prepped and draped in routine sterile  fashion. He received preoperative antibiotic and time-out was  performed.   A

## 2023-05-25 ENCOUNTER — TELEPHONE (OUTPATIENT)
Dept: UROLOGY | Age: 88
End: 2023-05-25

## 2023-05-25 VITALS
RESPIRATION RATE: 18 BRPM | HEART RATE: 67 BPM | TEMPERATURE: 97.5 F | HEIGHT: 68 IN | BODY MASS INDEX: 32.13 KG/M2 | WEIGHT: 212 LBS | DIASTOLIC BLOOD PRESSURE: 64 MMHG | OXYGEN SATURATION: 98 % | SYSTOLIC BLOOD PRESSURE: 139 MMHG

## 2023-05-25 LAB
ANION GAP SERPL CALCULATED.3IONS-SCNC: 11 MMOL/L (ref 7–19)
BASOPHILS # BLD: 0 K/UL (ref 0–0.2)
BASOPHILS NFR BLD: 0.3 % (ref 0–1)
BUN SERPL-MCNC: 22 MG/DL (ref 8–23)
CALCIUM SERPL-MCNC: 8.9 MG/DL (ref 8.8–10.2)
CHLORIDE SERPL-SCNC: 102 MMOL/L (ref 98–111)
CO2 SERPL-SCNC: 24 MMOL/L (ref 22–29)
CREAT SERPL-MCNC: 1.3 MG/DL (ref 0.5–1.2)
EOSINOPHIL # BLD: 0 K/UL (ref 0–0.6)
EOSINOPHIL NFR BLD: 0 % (ref 0–5)
ERYTHROCYTE [DISTWIDTH] IN BLOOD BY AUTOMATED COUNT: 12.7 % (ref 11.5–14.5)
GLUCOSE BLD-MCNC: 157 MG/DL (ref 70–99)
GLUCOSE SERPL-MCNC: 193 MG/DL (ref 74–109)
HCT VFR BLD AUTO: 35.9 % (ref 42–52)
HGB BLD-MCNC: 12.2 G/DL (ref 14–18)
IMM GRANULOCYTES # BLD: 0.1 K/UL
LYMPHOCYTES # BLD: 0.7 K/UL (ref 1.1–4.5)
LYMPHOCYTES NFR BLD: 6.1 % (ref 20–40)
MCH RBC QN AUTO: 31.3 PG (ref 27–31)
MCHC RBC AUTO-ENTMCNC: 34 G/DL (ref 33–37)
MCV RBC AUTO: 92.1 FL (ref 80–94)
MONOCYTES # BLD: 0.6 K/UL (ref 0–0.9)
MONOCYTES NFR BLD: 5.2 % (ref 0–10)
NEUTROPHILS # BLD: 10.1 K/UL (ref 1.5–7.5)
NEUTS SEG NFR BLD: 88 % (ref 50–65)
PERFORMED ON: ABNORMAL
PLATELET # BLD AUTO: 179 K/UL (ref 130–400)
PMV BLD AUTO: 11 FL (ref 9.4–12.4)
POTASSIUM SERPL-SCNC: 4.5 MMOL/L (ref 3.5–5)
RBC # BLD AUTO: 3.9 M/UL (ref 4.7–6.1)
SODIUM SERPL-SCNC: 137 MMOL/L (ref 136–145)
WBC # BLD AUTO: 11.4 K/UL (ref 4.8–10.8)

## 2023-05-25 PROCEDURE — 6360000002 HC RX W HCPCS: Performed by: UROLOGY

## 2023-05-25 PROCEDURE — 80048 BASIC METABOLIC PNL TOTAL CA: CPT

## 2023-05-25 PROCEDURE — G0378 HOSPITAL OBSERVATION PER HR: HCPCS

## 2023-05-25 PROCEDURE — 94760 N-INVAS EAR/PLS OXIMETRY 1: CPT

## 2023-05-25 PROCEDURE — 99238 HOSP IP/OBS DSCHRG MGMT 30/<: CPT | Performed by: UROLOGY

## 2023-05-25 PROCEDURE — 36415 COLL VENOUS BLD VENIPUNCTURE: CPT

## 2023-05-25 PROCEDURE — 85025 COMPLETE CBC W/AUTO DIFF WBC: CPT

## 2023-05-25 PROCEDURE — 82962 GLUCOSE BLOOD TEST: CPT

## 2023-05-25 PROCEDURE — 6370000000 HC RX 637 (ALT 250 FOR IP): Performed by: UROLOGY

## 2023-05-25 RX ORDER — OXYCODONE HYDROCHLORIDE AND ACETAMINOPHEN 5; 325 MG/1; MG/1
1 TABLET ORAL EVERY 6 HOURS PRN
Qty: 12 TABLET | Refills: 0 | Status: SHIPPED | OUTPATIENT
Start: 2023-05-25 | End: 2023-05-28

## 2023-05-25 RX ORDER — CIPROFLOXACIN 500 MG/1
500 TABLET, FILM COATED ORAL DAILY
Qty: 10 TABLET | Refills: 0 | Status: SHIPPED | OUTPATIENT
Start: 2023-05-25 | End: 2023-06-04

## 2023-05-25 RX ORDER — CLOPIDOGREL BISULFATE 75 MG/1
75 TABLET ORAL
Qty: 1 TABLET | Refills: 0
Start: 2023-05-31

## 2023-05-25 RX ADMIN — LEVOFLOXACIN 250 MG: 250 INJECTION, SOLUTION INTRAVENOUS at 08:41

## 2023-05-25 RX ADMIN — POTASSIUM CHLORIDE 20 MEQ: 1500 TABLET, EXTENDED RELEASE ORAL at 08:42

## 2023-05-25 RX ADMIN — PANTOPRAZOLE SODIUM 40 MG: 40 TABLET, DELAYED RELEASE ORAL at 08:42

## 2023-05-25 RX ADMIN — THEOPHYLLINE 300 MG: 300 TABLET, EXTENDED RELEASE ORAL at 02:00

## 2023-05-25 RX ADMIN — CETIRIZINE HYDROCHLORIDE 10 MG: 10 TABLET, FILM COATED ORAL at 08:42

## 2023-05-25 RX ADMIN — MULTIPLE VITAMINS W/ MINERALS TAB 1 TABLET: TAB at 08:41

## 2023-05-25 RX ADMIN — CARVEDILOL 12.5 MG: 12.5 TABLET, FILM COATED ORAL at 08:41

## 2023-05-25 RX ADMIN — GLIPIZIDE 2.5 MG: 5 TABLET ORAL at 08:42

## 2023-05-25 RX ADMIN — LEVOTHYROXINE SODIUM 25 MCG: 25 TABLET ORAL at 08:42

## 2023-05-25 RX ADMIN — LISINOPRIL 40 MG: 20 TABLET ORAL at 08:41

## 2023-05-25 RX ADMIN — OXYCODONE HYDROCHLORIDE AND ACETAMINOPHEN 1 TABLET: 5; 325 TABLET ORAL at 02:00

## 2023-05-25 RX ADMIN — BUMETANIDE 0.5 MG: 1 TABLET ORAL at 08:42

## 2023-05-25 ASSESSMENT — PAIN SCALES - GENERAL: PAINLEVEL_OUTOF10: 4

## 2023-05-25 ASSESSMENT — PAIN DESCRIPTION - ORIENTATION: ORIENTATION: LOWER

## 2023-05-25 ASSESSMENT — PAIN DESCRIPTION - LOCATION: LOCATION: ABDOMEN

## 2023-05-25 ASSESSMENT — PAIN DESCRIPTION - DESCRIPTORS: DESCRIPTORS: ACHING

## 2023-05-25 NOTE — DISCHARGE INSTR - DIET

## 2023-05-25 NOTE — TELEPHONE ENCOUNTER
Samir Carolina from [facility] called stating patient needs a 6 day cath removal and fill and pull. He also needs a 2 week post op with Dr. Fatoumata Franco. Patient is being discharged today.     Thank you

## 2023-05-25 NOTE — PROGRESS NOTES
Placed leg bag for tejada, pt and pts daughter voiced understanding of discharge instructions discharged via wc per transportation no complaints voiced

## 2023-05-25 NOTE — DISCHARGE INSTRUCTIONS
[address] [facility], Dr Narendra Robison    Cystoscopy / Ureteroscopy / Bladder Endoscopy Procedures Discharge Instructions      You may experience : Burning sensation when you void     A feeling of a need to go to the bathroom frequently     You may have urgent urination     Your urine may be blood tinged    These symptoms should be relieved within a few hours and days  as you increase the amounts of fluids you drink and the number of times that you empty your bladder. They may persist for 1-2 weeks if you have a stent or had a biopsy or resection. We recommended that you drink plenty of fluid a few days after surgery. If you have a ureteral stent he may have pain in your side or back related to the stent. Stents also cause bladder irritation symptoms of frequency and urgency. No strenuous activities for 1 week or  until you talk to your doctor. You may feel light headed up to 24 hours after anesthesia. You should not do the following for the next 24 hours. Drive a car, operate machinery or power tools     Drink any alcoholic drinks (not even beer or wine)     Make any important decisions, i.e., signing important papers. It is recommended that you begin with clear liquids and/or light foods. If you  Are not nauseated, progress to your normal diet. I you are unable to urinate you should call your surgeon.     Dr. Zamzam Casillas

## 2023-05-25 NOTE — PROGRESS NOTES
CLINICAL PHARMACY NOTE: MEDS TO BEDS    Total # of Prescriptions Filled: 2   The following medications were delivered to the patient:  Current Discharge Medication List        START taking these medications    Details   ciprofloxacin (CIPRO) 500 MG tablet Take 1 tablet by mouth daily for 10 days  Qty: 10 tablet, Refills: 0               Additional Documentation:  Handed scripts to patients daughter and patient was alert as well. Paid with card.

## 2023-05-25 NOTE — DISCHARGE SUMMARY
Discharge Summary    Date:5/25/2023        Patient Name:Manuel Howard     Date of Birth:[birth year]     Age:88 y.o. 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 [facility] Stay   Narrative of Hospital Course: Patient was admitted as a same-day surgery admit to undergo TURBT for bladder tumor involving the bladder neck anteriorly where the prostate protruding into the bladder. This was done under general anesthetic without complication. Postoperatively he is maintained with three-way Aranda catheter to CBI this cleared overnight was able to be weaned off. He did have some spasm and dysuria. Otherwise he was medically stable the next day his urine was light red and felt that he could be dismissed to home with his Aranda catheter. He does take Plavix and aspirin which he will hold for the next 5 days. He will return to see us in the office in 6 days for catheter removal.  He will follow-up to see me in 2 weeks    Consultants:   None    Time Spent on Discharge:  <30 minutes were spent in patient examination, evaluation, counseling as well as medication reconciliation, prescriptions for required medications, discharge plan and follow up.       Surgeries/Procedures Performed:  Procedure(s):  CYSTOSCOPY TRANSURETHRAL RESECTION BLADDER TUMOR 5/24/2023    Treatments:   surgery: Done 5/24/2023    Significant Diagnostic Studies:   Recent Labs:  CBC:   Lab Results   Component Value Date/Time    WBC 11.4 05/25/2023 02:31 AM    RBC 3.90 05/25/2023 02:31 AM    HGB 12.2 05/25/2023 02:31 AM    HCT 35.9 05/25/2023 02:31 AM    MCV 92.1 05/25/2023 02:31 AM    MCH 31.3 05/25/2023 02:31 AM    MCHC 34.0 05/25/2023 02:31 AM    RDW 12.7 05/25/2023 02:31 AM     05/25/2023 02:31 AM     BMP:    Lab Results   Component

## 2023-05-29 ENCOUNTER — HOSPITAL ENCOUNTER (EMERGENCY)
Age: 88
Discharge: HOME | End: 2023-05-29
Payer: MEDICARE

## 2023-05-29 VITALS
HEIGHT: 68 IN | WEIGHT: 212 LBS | DIASTOLIC BLOOD PRESSURE: 58 MMHG | SYSTOLIC BLOOD PRESSURE: 121 MMHG | OXYGEN SATURATION: 94 % | BODY MASS INDEX: 32.13 KG/M2 | TEMPERATURE: 97.9 F | RESPIRATION RATE: 20 BRPM | HEART RATE: 68 BPM

## 2023-05-29 DIAGNOSIS — R30.1 PAINFUL BLADDER SPASM: Primary | ICD-10-CM

## 2023-05-29 LAB
ALBUMIN SERPL-MCNC: 3.7 G/DL (ref 3.5–5.2)
ALP SERPL-CCNC: 75 U/L (ref 40–130)
ALT SERPL-CCNC: 10 U/L (ref 5–41)
ANION GAP SERPL CALCULATED.3IONS-SCNC: 9 MMOL/L (ref 7–19)
AST SERPL-CCNC: 17 U/L (ref 5–40)
BACTERIA URNS QL MICRO: NEGATIVE /HPF
BASOPHILS # BLD: 0.1 K/UL (ref 0–0.2)
BASOPHILS NFR BLD: 1.2 % (ref 0–1)
BILIRUB SERPL-MCNC: <0.2 MG/DL (ref 0.2–1.2)
BILIRUB UR QL STRIP: NEGATIVE
BUN SERPL-MCNC: 22 MG/DL (ref 8–23)
CALCIUM SERPL-MCNC: 9.9 MG/DL (ref 8.8–10.2)
CHLORIDE SERPL-SCNC: 104 MMOL/L (ref 98–111)
CLARITY UR: ABNORMAL
CO2 SERPL-SCNC: 25 MMOL/L (ref 22–29)
COLOR UR: YELLOW
CREAT SERPL-MCNC: 1.6 MG/DL (ref 0.5–1.2)
CRYSTALS URNS MICRO: ABNORMAL /HPF
EOSINOPHIL # BLD: 0.2 K/UL (ref 0–0.6)
EOSINOPHIL NFR BLD: 2.1 % (ref 0–5)
EPI CELLS #/AREA URNS AUTO: 2 /HPF (ref 0–5)
ERYTHROCYTE [DISTWIDTH] IN BLOOD BY AUTOMATED COUNT: 13.1 % (ref 11.5–14.5)
GLUCOSE SERPL-MCNC: 139 MG/DL (ref 74–109)
GLUCOSE UR STRIP.AUTO-MCNC: NEGATIVE MG/DL
HCT VFR BLD AUTO: 36.7 % (ref 42–52)
HGB BLD-MCNC: 12.1 G/DL (ref 14–18)
HGB UR STRIP.AUTO-MCNC: ABNORMAL MG/L
HYALINE CASTS #/AREA URNS AUTO: 10 /HPF (ref 0–8)
IMM GRANULOCYTES # BLD: 0.1 K/UL
KETONES UR STRIP.AUTO-MCNC: NEGATIVE MG/DL
LEUKOCYTE ESTERASE UR QL STRIP.AUTO: ABNORMAL
LYMPHOCYTES # BLD: 1.5 K/UL (ref 1.1–4.5)
LYMPHOCYTES NFR BLD: 17.9 % (ref 20–40)
MCH RBC QN AUTO: 31.4 PG (ref 27–31)
MCHC RBC AUTO-ENTMCNC: 33 G/DL (ref 33–37)
MCV RBC AUTO: 95.3 FL (ref 80–94)
MONOCYTES # BLD: 0.8 K/UL (ref 0–0.9)
MONOCYTES NFR BLD: 9.8 % (ref 0–10)
NEUTROPHILS # BLD: 5.7 K/UL (ref 1.5–7.5)
NEUTS SEG NFR BLD: 68.3 % (ref 50–65)
NITRITE UR QL STRIP.AUTO: NEGATIVE
PH UR STRIP.AUTO: 5.5 [PH] (ref 5–8)
PLATELET # BLD AUTO: 176 K/UL (ref 130–400)
PMV BLD AUTO: 10.7 FL (ref 9.4–12.4)
POTASSIUM SERPL-SCNC: 4.5 MMOL/L (ref 3.5–5)
PROT SERPL-MCNC: 6.8 G/DL (ref 6.6–8.7)
PROT UR STRIP.AUTO-MCNC: 300 MG/DL
RBC # BLD AUTO: 3.85 M/UL (ref 4.7–6.1)
RBC #/AREA URNS AUTO: 241 /HPF (ref 0–4)
SODIUM SERPL-SCNC: 138 MMOL/L (ref 136–145)
SP GR UR STRIP.AUTO: 1.01 (ref 1–1.03)
UROBILINOGEN UR STRIP.AUTO-MCNC: 0.2 E.U./DL
WBC # BLD AUTO: 8.3 K/UL (ref 4.8–10.8)
WBC #/AREA URNS AUTO: 48 /HPF (ref 0–5)

## 2023-05-29 PROCEDURE — 87086 URINE CULTURE/COLONY COUNT: CPT

## 2023-05-29 PROCEDURE — 85025 COMPLETE CBC W/AUTO DIFF WBC: CPT

## 2023-05-29 PROCEDURE — 80053 COMPREHEN METABOLIC PANEL: CPT

## 2023-05-29 PROCEDURE — 36415 COLL VENOUS BLD VENIPUNCTURE: CPT

## 2023-05-29 PROCEDURE — 6360000002 HC RX W HCPCS: Performed by: PHYSICIAN ASSISTANT

## 2023-05-29 PROCEDURE — 96374 THER/PROPH/DIAG INJ IV PUSH: CPT

## 2023-05-29 PROCEDURE — 81001 URINALYSIS AUTO W/SCOPE: CPT

## 2023-05-29 PROCEDURE — 96375 TX/PRO/DX INJ NEW DRUG ADDON: CPT

## 2023-05-29 PROCEDURE — 99284 EMERGENCY DEPT VISIT MOD MDM: CPT

## 2023-05-29 RX ORDER — LIDOCAINE HYDROCHLORIDE 20 MG/ML
JELLY TOPICAL
Status: DISCONTINUED
Start: 2023-05-29 | End: 2023-05-29 | Stop reason: HOSPADM

## 2023-05-29 RX ORDER — HYDROMORPHONE HYDROCHLORIDE 1 MG/ML
0.5 INJECTION, SOLUTION INTRAMUSCULAR; INTRAVENOUS; SUBCUTANEOUS ONCE
Status: COMPLETED | OUTPATIENT
Start: 2023-05-29 | End: 2023-05-29

## 2023-05-29 RX ORDER — ONDANSETRON 2 MG/ML
4 INJECTION INTRAMUSCULAR; INTRAVENOUS ONCE
Status: COMPLETED | OUTPATIENT
Start: 2023-05-29 | End: 2023-05-29

## 2023-05-29 RX ORDER — PHENAZOPYRIDINE HYDROCHLORIDE 95 MG/1
95 TABLET ORAL 3 TIMES DAILY PRN
Qty: 12 TABLET | Refills: 0 | Status: SHIPPED | OUTPATIENT
Start: 2023-05-29 | End: 2023-06-01

## 2023-05-29 RX ORDER — MORPHINE SULFATE 4 MG/ML
4 INJECTION, SOLUTION INTRAMUSCULAR; INTRAVENOUS ONCE
Status: COMPLETED | OUTPATIENT
Start: 2023-05-29 | End: 2023-05-29

## 2023-05-29 RX ADMIN — MORPHINE SULFATE 4 MG: 4 INJECTION, SOLUTION INTRAMUSCULAR; INTRAVENOUS at 16:28

## 2023-05-29 RX ADMIN — ONDANSETRON 4 MG: 2 INJECTION INTRAMUSCULAR; INTRAVENOUS at 16:28

## 2023-05-29 RX ADMIN — HYDROMORPHONE HYDROCHLORIDE 0.5 MG: 1 INJECTION, SOLUTION INTRAMUSCULAR; INTRAVENOUS; SUBCUTANEOUS at 18:02

## 2023-05-29 ASSESSMENT — ENCOUNTER SYMPTOMS
SHORTNESS OF BREATH: 0
COLOR CHANGE: 0
BACK PAIN: 0
EYE ITCHING: 0
COUGH: 0
EYE DISCHARGE: 0
PHOTOPHOBIA: 0
APNEA: 0

## 2023-05-29 ASSESSMENT — PAIN DESCRIPTION - LOCATION: LOCATION: PENIS

## 2023-05-29 ASSESSMENT — PAIN SCALES - GENERAL: PAINLEVEL_OUTOF10: 9

## 2023-05-29 NOTE — ED PROVIDER NOTES
Tenderness: There is no abdominal tenderness. Musculoskeletal:         General: Normal range of motion. Cervical back: Normal range of motion and neck supple. Skin:     General: Skin is warm and dry. Capillary Refill: Capillary refill takes less than 2 seconds. Neurological:      Mental Status: He is alert and oriented to person, place, and time. Psychiatric:         Behavior: Behavior normal.         DIAGNOSTIC RESULTS     RADIOLOGY:   Non-plain film images such as CT, Ultrasound and MRI are read by the radiologist. Plain radiographic images are visualized and preliminarilyinterpreted by No att. providers found with the below findings:      Interpretation per the Radiologist below, if available at the time of this note:    No orders to display       LABS:  Labs Reviewed   CBC WITH AUTO DIFFERENTIAL - Abnormal; Notable for the following components:       Result Value    RBC 3.85 (*)     Hemoglobin 12.1 (*)     Hematocrit 36.7 (*)     MCV 95.3 (*)     MCH 31.4 (*)     Neutrophils % 68.3 (*)     Lymphocytes % 17.9 (*)     Basophils % 1.2 (*)     All other components within normal limits   COMPREHENSIVE METABOLIC PANEL W/ REFLEX TO MG FOR LOW K - Abnormal; Notable for the following components:    Glucose 139 (*)     Creatinine 1.6 (*)     Est, Glom Filt Rate 41 (*)     All other components within normal limits   URINALYSIS WITH REFLEX TO CULTURE - Abnormal; Notable for the following components:    Clarity, UA CLOUDY (*)     Blood, Urine LARGE (*)     Leukocyte Esterase, Urine SMALL (*)     All other components within normal limits   MICROSCOPIC URINALYSIS - Abnormal; Notable for the following components:    Bacteria, UA NEGATIVE (*)     Crystals, UA NEG (*)     Hyaline Casts, UA 10 (*)     WBC, UA 48 (*)     RBC,  (*)     All other components within normal limits   CULTURE, URINE       All other labs were within normal range or notreturned as of this dictation.     RE-ASSESSMENT

## 2023-05-30 ENCOUNTER — OFFICE VISIT (OUTPATIENT)
Dept: UROLOGY | Age: 88
End: 2023-05-30
Payer: MEDICARE

## 2023-05-30 VITALS — WEIGHT: 212 LBS | TEMPERATURE: 98.1 F | HEIGHT: 68 IN | BODY MASS INDEX: 32.13 KG/M2

## 2023-05-30 DIAGNOSIS — N32.89 BLADDER SPASMS: Primary | ICD-10-CM

## 2023-05-30 DIAGNOSIS — G89.18 POSTOPERATIVE PAIN: ICD-10-CM

## 2023-05-30 PROCEDURE — 1036F TOBACCO NON-USER: CPT | Performed by: NURSE PRACTITIONER

## 2023-05-30 PROCEDURE — G8417 CALC BMI ABV UP PARAM F/U: HCPCS | Performed by: NURSE PRACTITIONER

## 2023-05-30 PROCEDURE — 1123F ACP DISCUSS/DSCN MKR DOCD: CPT | Performed by: NURSE PRACTITIONER

## 2023-05-30 PROCEDURE — G8427 DOCREV CUR MEDS BY ELIG CLIN: HCPCS | Performed by: NURSE PRACTITIONER

## 2023-05-30 PROCEDURE — 99215 OFFICE O/P EST HI 40 MIN: CPT | Performed by: NURSE PRACTITIONER

## 2023-05-30 ASSESSMENT — ENCOUNTER SYMPTOMS
VOMITING: 0
BACK PAIN: 0
NAUSEA: 0
ABDOMINAL PAIN: 0
ABDOMINAL DISTENTION: 0

## 2023-05-30 NOTE — PROGRESS NOTES
Alexa Oneill is a 81 y.o., male, Established patient who presents today   Chief Complaint   Patient presents with    Follow-up     I am here for pain after surgery. It is stemming from my catheter. I went to the ER last night. I keep having bladder spasms. HPI   Patient presents for evaluation after being seen in the emergency room on Sunday for significant pain in his penis, suprapubic area, perineal area. His daughter accompanies him today and helps with collecting history. Patient is visibly in pain. His daughter reports that Sunday the patient was in excruciating pain and he was having no output in the Aranda catheter. Only leakage with what appeared to be very painful bladder spasms. He was seen in the emergency room where the catheter was replaced. She reports since that time his pain has not really subsided. Urine culture revealed no growth. Creatinine slightly elevated 1.6, but patient's baseline does vary. No significant gross hematuria was noted in the emergency room. He did have a TURBT on 5/24/2023 with Dr. Darlene Lewis for tumor found during evaluation of gross hematuria. He is chronically maintained on Plavix secondary to CAD. He has not currently restarted his medications. He states he has been on varying anticoagulants/antiplatelets since the late 90s. He is a previous smoker with a 40-pack-year history. 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 REVIEW OF SYSTEMS:  Review of Systems   Constitutional:  Negative for chills and fever. Gastrointestinal:  Negative for abdominal distention, abdominal pain, nausea and vomiting. Genitourinary:  Positive for penile pain. Negative for flank pain, frequency, hematuria (one blood clot noted in the leg bag) and urgency. C/o pain in penis, lower abdomen, perineal area   Musculoskeletal:  Negative for back pain and gait problem. Psychiatric/Behavioral:  Negative for agitation and confusion.       PHYSICAL EXAM:  Temp 98.1 °F

## 2023-05-31 LAB — BACTERIA UR CULT: NORMAL

## 2023-06-21 RX ORDER — ISOSORBIDE MONONITRATE 30 MG/1
30 TABLET, EXTENDED RELEASE ORAL NIGHTLY
Qty: 90 TABLET | Refills: 3 | Status: SHIPPED | OUTPATIENT
Start: 2023-06-21

## 2023-06-26 DIAGNOSIS — N40.1 BPH WITH OBSTRUCTION/LOWER URINARY TRACT SYMPTOMS: ICD-10-CM

## 2023-06-26 DIAGNOSIS — N13.8 BPH WITH OBSTRUCTION/LOWER URINARY TRACT SYMPTOMS: ICD-10-CM

## 2023-06-26 RX ORDER — TAMSULOSIN HYDROCHLORIDE 0.4 MG/1
0.4 CAPSULE ORAL DAILY
Qty: 90 CAPSULE | Refills: 2 | Status: SHIPPED | OUTPATIENT
Start: 2023-06-26

## 2023-07-31 DIAGNOSIS — E78.2 MIXED HYPERLIPIDEMIA: ICD-10-CM

## 2023-08-02 RX ORDER — SIMVASTATIN 40 MG
TABLET ORAL
Qty: 90 TABLET | Refills: 3 | OUTPATIENT
Start: 2023-08-02

## 2023-09-08 DIAGNOSIS — E78.2 MIXED HYPERLIPIDEMIA: ICD-10-CM

## 2023-09-08 RX ORDER — SIMVASTATIN 40 MG
TABLET ORAL
Qty: 90 TABLET | Refills: 3 | OUTPATIENT
Start: 2023-09-08

## 2023-09-11 ENCOUNTER — PROCEDURE VISIT (OUTPATIENT)
Dept: UROLOGY | Age: 88
End: 2023-09-11
Payer: MEDICARE

## 2023-09-11 VITALS — HEIGHT: 67 IN | TEMPERATURE: 97.3 F | WEIGHT: 211 LBS | BODY MASS INDEX: 33.12 KG/M2

## 2023-09-11 DIAGNOSIS — Z85.51 HISTORY OF BLADDER CANCER: ICD-10-CM

## 2023-09-11 DIAGNOSIS — N13.8 BPH WITH OBSTRUCTION/LOWER URINARY TRACT SYMPTOMS: Primary | ICD-10-CM

## 2023-09-11 DIAGNOSIS — N40.1 BPH WITH OBSTRUCTION/LOWER URINARY TRACT SYMPTOMS: Primary | ICD-10-CM

## 2023-09-11 LAB
BILIRUBIN, POC: NORMAL
BLOOD URINE, POC: NORMAL
CLARITY, POC: CLEAR
COLOR, POC: YELLOW
GLUCOSE URINE, POC: NORMAL
KETONES, POC: NORMAL
LEUKOCYTE EST, POC: NORMAL
NITRITE, POC: NORMAL
PH, POC: 5
POST VOID RESIDUAL (PVR): 104 ML
PROTEIN, POC: NORMAL
SPECIFIC GRAVITY, POC: 1.01
UROBILINOGEN, POC: 0.2

## 2023-09-11 PROCEDURE — 52000 CYSTOURETHROSCOPY: CPT | Performed by: UROLOGY

## 2023-09-11 PROCEDURE — 51798 US URINE CAPACITY MEASURE: CPT | Performed by: UROLOGY

## 2023-09-11 PROCEDURE — 81003 URINALYSIS AUTO W/O SCOPE: CPT | Performed by: UROLOGY

## 2023-09-11 RX ORDER — OXYCODONE HYDROCHLORIDE AND ACETAMINOPHEN 5; 325 MG/1; MG/1
1 TABLET ORAL EVERY 6 HOURS
COMMUNITY
Start: 2023-08-09

## 2023-09-11 NOTE — PROGRESS NOTES
BLADDER CANCER  Patient was first diagnosed with bladder cancer approximately 3 month(s) ago. 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 None  Last upper tract study: 6 month(s) ago. Study was a CT urogram done on 3/30/2023    Patient also has BPH he is on finasteride and tamsulosin    Cystoscopy Operative Note (9/11/23)  Surgeon: Negar Vilchis MD  Anesthesia: Urethral 2% Xylocaine   Indications: History bladder cancer  Position: Supine    Findings:   The patient was prepped and draped in the usual sterile fashion. The flexible cystoscope was advanced through the urethra and into the bladder under direct vision. The bladder was thoroughly inspected and the following was noted:    Residual Urine: mild  Urethra: normal appearing urethra with no masses, tenderness or lesions  Prostate: completely obstructing lateral lobes of prostate moderate to large; median lobe present? no.  No significant intravesical protrusion  Bladder: No tumors or CIS noted. No bladder diverticulum. There was mild trabeculation noted. No recurrent papillary tumor seen  Ureters: Clear efflux from both ureters. Orifices with normal configuration and location. The cystoscope was removed. The patient tolerated the procedure well. The patient was given a post procedure instructions and follow up.     Results for orders placed or performed in visit on 09/11/23   POCT Urinalysis No Micro (Auto)   Result Value Ref Range    Color, UA yellow     Clarity, UA clear     Glucose, UA POC -     Bilirubin, UA -     Ketones, UA -     Spec Grav, UA 1.015     Blood, UA POC -     pH, UA 5.0     Protein, UA POC -     Urobilinogen, UA 0.2     Leukocytes, UA -     Nitrite, UA

## 2023-09-19 DIAGNOSIS — E78.2 MIXED HYPERLIPIDEMIA: ICD-10-CM

## 2023-09-19 RX ORDER — SIMVASTATIN 40 MG
TABLET ORAL
Qty: 90 TABLET | Refills: 0 | Status: SHIPPED | OUTPATIENT
Start: 2023-09-19

## 2023-11-24 DIAGNOSIS — I10 ESSENTIAL HYPERTENSION: ICD-10-CM

## 2023-11-27 RX ORDER — POTASSIUM CHLORIDE 1500 MG/1
TABLET, EXTENDED RELEASE ORAL
Qty: 90 TABLET | Refills: 3 | Status: SHIPPED | OUTPATIENT
Start: 2023-11-27

## 2023-12-11 ENCOUNTER — PROCEDURE VISIT (OUTPATIENT)
Dept: UROLOGY | Age: 88
End: 2023-12-11
Payer: MEDICARE

## 2023-12-11 VITALS — WEIGHT: 208 LBS | BODY MASS INDEX: 31.52 KG/M2 | HEIGHT: 68 IN | TEMPERATURE: 97.9 F

## 2023-12-11 DIAGNOSIS — Z85.51 HISTORY OF BLADDER CANCER: ICD-10-CM

## 2023-12-11 LAB
BILIRUBIN, POC: NORMAL
BLOOD URINE, POC: NORMAL
CLARITY, POC: CLEAR
COLOR, POC: YELLOW
GLUCOSE URINE, POC: NORMAL
KETONES, POC: NORMAL
LEUKOCYTE EST, POC: NORMAL
NITRITE, POC: NORMAL
PH, POC: 5.5
PROTEIN, POC: NORMAL
SPECIFIC GRAVITY, POC: 1.02
UROBILINOGEN, POC: 0.2

## 2023-12-11 PROCEDURE — 81003 URINALYSIS AUTO W/O SCOPE: CPT | Performed by: UROLOGY

## 2023-12-11 PROCEDURE — 52000 CYSTOURETHROSCOPY: CPT | Performed by: UROLOGY

## 2023-12-11 RX ORDER — AZITHROMYCIN 250 MG/1
TABLET, FILM COATED ORAL
COMMUNITY
Start: 2023-12-06

## 2023-12-11 NOTE — PROGRESS NOTES
BLADDER CANCER  Patient was first diagnosed with bladder cancer approximately 6 month(s) ago. 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 None  Last upper tract study: 9 month(s) ago. Study was a CT urogram done 3/30/2023    Patient has BPH on finasteride and tamsulosin he reports his urination is unchanged    Cystoscopy Operative Note (12/11/23)  Surgeon: Arlene Campoverde MD  Anesthesia: Urethral 2% Xylocaine   Indications: History of bladder cancer  Position: Dorsal Lithotomy    Findings:   The patient was prepped and draped in the usual sterile fashion. The flexible cystoscope cystoscope was inserted under direct vision into the bladder. The bladder was thoroughly inspected   and the following was noted:    Residual Urine: mild  Urethra: normal appearing urethra with no masses, tenderness or lesions prostate 3+ lateral lobe enlargement with obstruction with some protrusion into the base of bladder. Bladder: No tumors or CIS noted. No bladder diverticulum. There was mild trabeculation noted. No recurrent papillary tumors were seen  Ureters: Clear efflux from both ureters. Orifices with normal configuration and location. The cystoscope was removed. The patient tolerated the procedure well. The patient was given a post procedure instructions and follow up. Results for orders placed or performed in visit on 12/11/23   POCT Urinalysis No Micro (Auto)   Result Value Ref Range    Color, UA yellow     Clarity, UA clear     Glucose, UA POC -     Bilirubin, UA -     Ketones, UA -     Spec Grav, UA 1.025     Blood, UA POC -     pH, UA 5.5     Protein, UA POC -     Urobilinogen, UA 0.2     Leukocytes, UA -     Nitrite, UA -            1.  History of bladder

## 2023-12-22 DIAGNOSIS — E78.2 MIXED HYPERLIPIDEMIA: ICD-10-CM

## 2023-12-27 RX ORDER — SIMVASTATIN 40 MG
TABLET ORAL
Qty: 30 TABLET | Refills: 1 | Status: SHIPPED | OUTPATIENT
Start: 2023-12-27

## 2024-01-03 ENCOUNTER — OFFICE VISIT (OUTPATIENT)
Dept: CARDIOLOGY CLINIC | Age: 89
End: 2024-01-03
Payer: MEDICARE

## 2024-01-03 VITALS
SYSTOLIC BLOOD PRESSURE: 118 MMHG | DIASTOLIC BLOOD PRESSURE: 62 MMHG | HEART RATE: 66 BPM | HEIGHT: 68 IN | WEIGHT: 204 LBS | BODY MASS INDEX: 30.92 KG/M2

## 2024-01-03 DIAGNOSIS — I51.89 DIASTOLIC DYSFUNCTION: ICD-10-CM

## 2024-01-03 DIAGNOSIS — I25.10 CORONARY ARTERY DISEASE INVOLVING NATIVE HEART WITHOUT ANGINA PECTORIS, UNSPECIFIED VESSEL OR LESION TYPE: Primary | ICD-10-CM

## 2024-01-03 PROCEDURE — G8417 CALC BMI ABV UP PARAM F/U: HCPCS | Performed by: INTERNAL MEDICINE

## 2024-01-03 PROCEDURE — G8427 DOCREV CUR MEDS BY ELIG CLIN: HCPCS | Performed by: INTERNAL MEDICINE

## 2024-01-03 PROCEDURE — 99214 OFFICE O/P EST MOD 30 MIN: CPT | Performed by: INTERNAL MEDICINE

## 2024-01-03 PROCEDURE — 1036F TOBACCO NON-USER: CPT | Performed by: INTERNAL MEDICINE

## 2024-01-03 PROCEDURE — G8484 FLU IMMUNIZE NO ADMIN: HCPCS | Performed by: INTERNAL MEDICINE

## 2024-01-03 PROCEDURE — 1123F ACP DISCUSS/DSCN MKR DOCD: CPT | Performed by: INTERNAL MEDICINE

## 2024-01-03 ASSESSMENT — ENCOUNTER SYMPTOMS
ABDOMINAL DISTENTION: 0
WHEEZING: 0
SHORTNESS OF BREATH: 1
BACK PAIN: 0
ABDOMINAL PAIN: 0
BLOOD IN STOOL: 0
COUGH: 0
VOMITING: 0
DIARRHEA: 0

## 2024-01-03 NOTE — PROGRESS NOTES
General: Skin is warm.      Coloration: Skin is not pale.      Findings: No erythema or rash.   Neurological:      Mental Status: He is alert and oriented to person, place, and time.      Motor: No abnormal muscle tone.      Coordination: Coordination normal.      Deep Tendon Reflexes: Reflexes normal.           Labs:   CBC: No results for input(s): \"WBC\", \"HGB\", \"HCT\", \"PLT\" in the last 72 hours.  BMP:No results for input(s): \"NA\", \"K\", \"CO2\", \"BUN\", \"CREATININE\", \"LABGLOM\", \"GLUCOSE\" in the last 72 hours.  BNP: No results for input(s): \"BNP\" in the last 72 hours.  PT/INR: No results for input(s): \"PROTIME\", \"INR\" in the last 72 hours.  APTT:No results for input(s): \"APTT\" in the last 72 hours.  CARDIAC ENZYMES:No results for input(s): \"CKTOTAL\", \"CKMB\", \"CKMBINDEX\", \"TROPONINI\" in the last 72 hours.  FASTING LIPID PANEL:  Lab Results   Component Value Date/Time    HDL 24 10/15/2015 12:55 AM    LDLDIRECT 61 10/15/2015 12:55 AM    LDLCALC 43 06/09/2011 11:30 AM    TRIG 202 10/15/2015 12:55 AM    TRIG 124 12/08/2010 12:00 AM     LIVER PROFILE:No results for input(s): \"AST\", \"ALT\", \"LABALBU\" in the last 72 hours.        Imaging:    Lexiscan Nuclear Stress Test Report  Procedure date: 09/10/21  Indications: chest pain  Procedure: Stress was performed with injection of 0.4 mg Lexiscan.  Vital signs and EKG were monitored. Technetium-99 sestamibi was  injected in divided doses, approximately 10 mCi and 30 mCi  respectively for rest and stress imaging. The patient was discharged  in stable condition.  Results: Patient had symptoms of dyspnea during infusion that resolved  in recovery. Baseline EKG showed LBBB, normal sinus rhythm without  ST/T changes. During stress there were no significant EKG changes or  rhythm changes. Baseline and peak blood pressures were 158/70, and  158/70 respectively.  Baseline and peak heart rates were 72 and  76  respectively.  Lexiscan/Cardiolyte Nuclear Medicine Report  Date of Procedure:

## 2024-01-08 ENCOUNTER — HOSPITAL ENCOUNTER (OUTPATIENT)
Dept: NON INVASIVE DIAGNOSTICS | Age: 89
Discharge: HOME | End: 2024-01-08
Attending: INTERNAL MEDICINE
Payer: MEDICARE

## 2024-01-08 DIAGNOSIS — I25.10 CORONARY ARTERY DISEASE INVOLVING NATIVE HEART WITHOUT ANGINA PECTORIS, UNSPECIFIED VESSEL OR LESION TYPE: ICD-10-CM

## 2024-01-08 DIAGNOSIS — I51.89 DIASTOLIC DYSFUNCTION: ICD-10-CM

## 2024-01-08 PROCEDURE — 6360000004 HC RX CONTRAST MEDICATION: Performed by: INTERNAL MEDICINE

## 2024-01-08 PROCEDURE — C8929 TTE W OR WO FOL WCON,DOPPLER: HCPCS

## 2024-01-08 RX ADMIN — PERFLUTREN 1.5 ML: 6.52 INJECTION, SUSPENSION INTRAVENOUS at 11:46

## 2024-01-09 ENCOUNTER — TELEPHONE (OUTPATIENT)
Dept: CARDIOLOGY CLINIC | Age: 89
End: 2024-01-09

## 2024-01-09 NOTE — TELEPHONE ENCOUNTER
----- Message from Jad Maria MD sent at 1/8/2024  7:21 PM CST -----  Please let patient know that his echocardiogram shows good heart function.  Continue medical therapy and follow-up as per prior appointment.

## 2024-01-17 DIAGNOSIS — I10 ESSENTIAL HYPERTENSION: ICD-10-CM

## 2024-01-17 DIAGNOSIS — E87.79 OTHER FLUID OVERLOAD: ICD-10-CM

## 2024-01-17 RX ORDER — BUMETANIDE 1 MG/1
TABLET ORAL
Qty: 90 TABLET | Refills: 3 | Status: SHIPPED | OUTPATIENT
Start: 2024-01-17

## 2024-01-17 RX ORDER — RAMIPRIL 10 MG/1
CAPSULE ORAL
Qty: 90 CAPSULE | Refills: 3 | Status: SHIPPED | OUTPATIENT
Start: 2024-01-17

## 2024-02-19 DIAGNOSIS — E78.2 MIXED HYPERLIPIDEMIA: ICD-10-CM

## 2024-02-19 RX ORDER — SIMVASTATIN 40 MG
TABLET ORAL
Qty: 30 TABLET | Refills: 1 | OUTPATIENT
Start: 2024-02-19

## 2024-02-22 DIAGNOSIS — E78.2 MIXED HYPERLIPIDEMIA: ICD-10-CM

## 2024-02-26 RX ORDER — SIMVASTATIN 40 MG
TABLET ORAL
Qty: 30 TABLET | Refills: 5 | Status: SHIPPED | OUTPATIENT
Start: 2024-02-26

## 2024-03-06 DIAGNOSIS — N13.8 BPH WITH OBSTRUCTION/LOWER URINARY TRACT SYMPTOMS: ICD-10-CM

## 2024-03-06 DIAGNOSIS — N40.1 BPH WITH OBSTRUCTION/LOWER URINARY TRACT SYMPTOMS: ICD-10-CM

## 2024-03-06 RX ORDER — TAMSULOSIN HYDROCHLORIDE 0.4 MG/1
CAPSULE ORAL DAILY
Qty: 90 CAPSULE | Refills: 3 | Status: SHIPPED | OUTPATIENT
Start: 2024-03-06

## 2024-03-06 RX ORDER — FINASTERIDE 5 MG/1
5 TABLET, FILM COATED ORAL DAILY
Qty: 90 TABLET | Refills: 3 | Status: SHIPPED | OUTPATIENT
Start: 2024-03-06

## 2024-03-13 DIAGNOSIS — I25.10 CORONARY ARTERY DISEASE INVOLVING NATIVE HEART WITHOUT ANGINA PECTORIS, UNSPECIFIED VESSEL OR LESION TYPE: ICD-10-CM

## 2024-03-15 RX ORDER — CLOPIDOGREL BISULFATE 75 MG/1
75 TABLET ORAL DAILY
Qty: 90 TABLET | Refills: 3 | Status: SHIPPED | OUTPATIENT
Start: 2024-03-15

## 2024-03-18 ENCOUNTER — PROCEDURE VISIT (OUTPATIENT)
Dept: UROLOGY | Age: 89
End: 2024-03-18
Payer: MEDICARE

## 2024-03-18 VITALS — BODY MASS INDEX: 31.98 KG/M2 | WEIGHT: 211 LBS | TEMPERATURE: 97 F | HEIGHT: 68 IN

## 2024-03-18 DIAGNOSIS — Z85.51 HISTORY OF BLADDER CANCER: ICD-10-CM

## 2024-03-18 LAB
APPEARANCE FLUID: CLEAR
BILIRUBIN, POC: NORMAL
BLOOD URINE, POC: NORMAL
CLARITY, POC: CLEAR
COLOR, POC: YELLOW
GLUCOSE URINE, POC: NORMAL
KETONES, POC: NORMAL
LEUKOCYTE EST, POC: NORMAL
NITRITE, POC: NORMAL
PH, POC: 5
PROTEIN, POC: NORMAL
SPECIFIC GRAVITY, POC: 1.02
UROBILINOGEN, POC: 0.2

## 2024-03-18 PROCEDURE — 52000 CYSTOURETHROSCOPY: CPT | Performed by: UROLOGY

## 2024-03-18 PROCEDURE — 81002 URINALYSIS NONAUTO W/O SCOPE: CPT | Performed by: UROLOGY

## 2024-03-18 NOTE — PROGRESS NOTES
BLADDER CANCER  Patient was first diagnosed with bladder cancer approximately 10 month(s) ago.     Last Recurrence: He has not had a recurrence his initial diagnosis was TURBT done 5/24/2023  Stage of bladder cancer at last recurrence: 8130/2 - Papillary transitional cell carcinoma, non-invasive, stage TA  Grade: low grade  Last urinary cytology/FISH results: not done; Date: Sent today is pending  Hematuria?  None  Last upper tract study: 1 year(s) ago.  Study was a CT urogram, done 3/30/2023        Cystoscopy Operative Note (3/18/24)  Surgeon: Eugene Avery MD  Anesthesia: Urethral 2% Xylocaine   Indications: History of bladder cancer  Position: Supine    Findings:   The patient was prepped and draped in the usual sterile fashion.  The flexible cystoscope was advanced through the urethra and into the bladder under direct vision.  The bladder was thoroughly inspected and the following was noted:    Residual Urine: moderate  Urethra: normal appearing urethra with no masses, tenderness or lesions  Prostate: completely obstructing lateral lobes of prostate; median lobe present? no.  He has 3+ prostatic enlargement with obstructing lateral lobes some protrusion into the patient's bladder base  Bladder: No tumors or CIS noted.  No bladder diverticulum.  There was moderate trabeculation noted.  There was an area appear inflammatory erythematous left lateral anterior no papillary changes no papillary tumors  Ureters: Clear efflux from both ureters.  Orifices with normal configuration and location.    The cystoscope was removed.  The patient tolerated the procedure well.  The patient was given a post procedure instructions and follow up.    Results for orders placed or performed in visit on 03/18/24   POCT Urinalysis no Micro   Result Value Ref Range    Color, UA yellow     Clarity, UA clear     Glucose, UA POC -     Bilirubin, UA -     Ketones, UA -     Spec Grav, UA 1.025     Blood, UA POC -     pH, UA 5     Protein,

## 2024-06-17 RX ORDER — ISOSORBIDE MONONITRATE 30 MG/1
30 TABLET, EXTENDED RELEASE ORAL NIGHTLY
Qty: 90 TABLET | Refills: 3 | Status: SHIPPED | OUTPATIENT
Start: 2024-06-17

## 2024-06-19 ENCOUNTER — PROCEDURE VISIT (OUTPATIENT)
Dept: UROLOGY | Age: 89
End: 2024-06-19
Payer: MEDICARE

## 2024-06-19 VITALS — HEIGHT: 68 IN | WEIGHT: 207 LBS | TEMPERATURE: 97.2 F | BODY MASS INDEX: 31.37 KG/M2

## 2024-06-19 DIAGNOSIS — Z85.51 HISTORY OF BLADDER CANCER: ICD-10-CM

## 2024-06-19 LAB
APPEARANCE FLUID: CLEAR
BILIRUBIN, POC: NORMAL
BLOOD URINE, POC: NORMAL
CLARITY, POC: CLEAR
COLOR, POC: YELLOW
GLUCOSE URINE, POC: NORMAL
KETONES, POC: NORMAL
LEUKOCYTE EST, POC: NORMAL
NITRITE, POC: NORMAL
PH, POC: 5
PROTEIN, POC: NORMAL
SPECIFIC GRAVITY, POC: 1.01
UROBILINOGEN, POC: 0.2

## 2024-06-19 PROCEDURE — 81002 URINALYSIS NONAUTO W/O SCOPE: CPT | Performed by: UROLOGY

## 2024-06-19 PROCEDURE — 52000 CYSTOURETHROSCOPY: CPT | Performed by: UROLOGY

## 2024-06-19 NOTE — PROGRESS NOTES
Glucose, UA POC -     Bilirubin, UA -     Ketones, UA -     Spec Grav, UA 1.015     Blood, UA POC -     pH, UA 5     Protein, UA POC -     Urobilinogen, UA 0.2     Leukocytes, UA trace     Nitrite, UA -     Appearance, Fluid Clear Clear, Slightly Cloudy           1. History of bladder cancer  Continue surveillance cystoscopy.  - SD CYSTOURETHROSCOPY  - Cytology, Non-Gyn  - POCT Urinalysis no Micro  - Cytology, Non-Gyn; Future  - SD CYSTOURETHROSCOPY; Future      Orders Placed This Encounter   Procedures    Cytology, Non-Gyn     Send urine today for cytology     Order Specific Question:   PREVIOUS BIOPSY     Answer:   No     Order Specific Question:   PREOP DIAGNOSIS     Answer:   History of bladder cancer     Order Specific Question:   FROZEN SECTION - NO OR YES/SPECIMEN     Answer:   No    Cytology, Non-Gyn     Prior to next visit in 3 mos     Standing Status:   Future     Standing Expiration Date:   6/19/2025     Order Specific Question:   PREVIOUS BIOPSY     Answer:   Yes     Order Specific Question:   PREOP DIAGNOSIS     Answer:   History of bladder cancer     Order Specific Question:   FROZEN SECTION - NO OR YES/SPECIMEN     Answer:   No    POCT Urinalysis no Micro    SD CYSTOURETHROSCOPY     Cystoscopy in 3 months     Standing Status:   Future     Standing Expiration Date:   6/19/2025       Return in about 3 months (around 9/19/2024) for Cystoscopy on next visit, Urine Cytology prior to next visit.        Eugene Avery MD

## 2024-07-08 ENCOUNTER — OFFICE VISIT (OUTPATIENT)
Dept: CARDIOLOGY CLINIC | Age: 89
End: 2024-07-08
Payer: MEDICARE

## 2024-07-08 VITALS
WEIGHT: 211 LBS | DIASTOLIC BLOOD PRESSURE: 60 MMHG | SYSTOLIC BLOOD PRESSURE: 124 MMHG | HEART RATE: 71 BPM | BODY MASS INDEX: 31.98 KG/M2 | HEIGHT: 68 IN | OXYGEN SATURATION: 96 %

## 2024-07-08 DIAGNOSIS — I25.10 CORONARY ARTERY DISEASE INVOLVING NATIVE HEART WITHOUT ANGINA PECTORIS, UNSPECIFIED VESSEL OR LESION TYPE: Primary | ICD-10-CM

## 2024-07-08 DIAGNOSIS — E78.2 MIXED HYPERLIPIDEMIA: ICD-10-CM

## 2024-07-08 DIAGNOSIS — I10 ESSENTIAL HYPERTENSION: ICD-10-CM

## 2024-07-08 DIAGNOSIS — Z95.1 HX OF CABG: ICD-10-CM

## 2024-07-08 DIAGNOSIS — R42 DIZZINESS: ICD-10-CM

## 2024-07-08 PROCEDURE — G8427 DOCREV CUR MEDS BY ELIG CLIN: HCPCS | Performed by: NURSE PRACTITIONER

## 2024-07-08 PROCEDURE — 99214 OFFICE O/P EST MOD 30 MIN: CPT | Performed by: NURSE PRACTITIONER

## 2024-07-08 PROCEDURE — 1123F ACP DISCUSS/DSCN MKR DOCD: CPT | Performed by: NURSE PRACTITIONER

## 2024-07-08 PROCEDURE — G8417 CALC BMI ABV UP PARAM F/U: HCPCS | Performed by: NURSE PRACTITIONER

## 2024-07-08 PROCEDURE — 1036F TOBACCO NON-USER: CPT | Performed by: NURSE PRACTITIONER

## 2024-07-08 RX ORDER — GABAPENTIN 300 MG/1
300 CAPSULE ORAL 3 TIMES DAILY
COMMUNITY
Start: 2024-07-03

## 2024-07-08 NOTE — PATIENT INSTRUCTIONS
reliever, such as acetaminophen (Tylenol).  When should you call for help?   [id number] if you have symptoms of a heart attack. These may include:  Chest pain or pressure, or a strange feeling in the chest.  Sweating.  Shortness of breath.  Pain, pressure, or a strange feeling in the back, neck, jaw, or upper belly or in one or both shoulders or arms.  Lightheadedness or sudden weakness.  A fast or irregular heartbeat.  After you call 911, the  may tell you to chew 1 adult-strength or 2 to 4 low-dose aspirin. Wait for an ambulance. Do not try to drive yourself.  Watch closely for changes in your health, and be sure to contact your doctor if you have any problems.  Where can you learn more?  Go to [email or web address] and sign in to your [employer] account. Enter F075 in the Search Health Information box to learn more about \"A Healthy Heart: Care Instructions.\"     If you do not have an account, please click on the \"Sign Up Now\" link.  Current as of: December 16, 2019               Content Version: 12.5  © 7794-6040 [employer].   Care instructions adapted under license by [employer]. If you have questions about a medical condition or this instruction, always ask your healthcare professional. [employer] disclaims any warranty or liability for your use of this information.

## 2024-07-08 NOTE — PROGRESS NOTES
from Last 3 Encounters:   07/08/24 71   01/03/24 66   06/12/23 71        Wt Readings from Last 3 Encounters:   07/08/24 95.7 kg (211 lb)   06/19/24 93.9 kg (207 lb)   03/18/24 95.7 kg (211 lb)     Assessment/Plan:   Diagnosis Orders   1. Coronary artery disease involving native heart without angina pectoris, unspecified vessel or lesion type        2. Essential hypertension        3. Mixed hyperlipidemia        4. Hx of CABG        5. Dizziness  Extended cardiac holter monitor (3 days-14 day)        Stable CV status without overt heart failure, sensed arrhythmia or angina.      CAD - stable on current medical management.  Continue same.    HTN - normotensive on current regimen.  BP today 124/60.    No drop in BP with standing to suggest orthostatic hypotension.    Hyperlipidemia - monitored and managed by PCP.   LDL 43. Continues Zocor 40 mg daily.    Intermittent dizziness past 6 months - Zio cardiac monitor placed for 7 days.  No stroke like symptoms reported.     Patient is compliant with medication regimen.    Previous cardiac history and records reviewed.  Continue current medical management for cardiac related condition.  Continue other current medications as directed.  Continue to follow up with primary care provider for non cardiac medical problems.  If your primary care provider is outside of the [facility], please request that your labs be faxed to this office at [phone].  BP goal 130/80 or less.  Call the office with any problems, questions or concerns at [phone].    Cardiology follow up as scheduled in Epic appointments.  Educational included in patient instructions.  Heart health.      CHELSIE Hernandez

## 2024-07-29 ENCOUNTER — TELEPHONE (OUTPATIENT)
Dept: CARDIOLOGY CLINIC | Age: 89
End: 2024-07-29

## 2024-07-29 NOTE — TELEPHONE ENCOUNTER
Manuel Daughter Gricelda would like a call to discuss  Zio cardiac Monitor results . Gricelda preferred call back time is   as soon as possible      Thank you.

## 2024-07-30 ENCOUNTER — TELEPHONE (OUTPATIENT)
Dept: CARDIOLOGY CLINIC | Age: 89
End: 2024-07-30

## 2024-07-30 NOTE — TELEPHONE ENCOUNTER
Patient called back, I told the spouse that the Cardiac Moniter is not done. She understood and I told her when Nasima gets the results we will call

## 2024-08-05 ENCOUNTER — TELEPHONE (OUTPATIENT)
Dept: CARDIOLOGY CLINIC | Age: 89
End: 2024-08-05

## 2024-08-05 DIAGNOSIS — R00.1 BRADYCARDIA: ICD-10-CM

## 2024-08-05 DIAGNOSIS — I45.4 BUNDLE BRANCH BLOCK: ICD-10-CM

## 2024-08-05 DIAGNOSIS — I44.1 WENCKEBACH: Primary | ICD-10-CM

## 2024-08-05 NOTE — TELEPHONE ENCOUNTER
Called patient's daughter to discuss Zio report.    Underlying rhythm - NSR  Average heart rate - 56  Minimum heart rate - 29 bpm at 3:14 am.  Max heart rate -85 bpm  1st degree AVB, Wenckebach and BBB noted.    Wenckeback during sleep.  No VT, pauses, AF or SVT.  Occasional PAC and rare PVC.  Hx CAD s/p CABG.  On Coreg 12.5 mg BID.  1/8/24 echo EF 70%.    The patient's daughter reports no syncope or near syncope.  Her father does still report some dizzy spells.  She is unaware how BP has been running.    Advised to reduce Coreg to 12.5 mg 1/2 tab once daily.  Will mail out Zio for repeat monitoring in one week.  Schedule telephone follow up in 3 weeks.    CHELSIE Hernandez

## 2024-08-06 DIAGNOSIS — R00.1 BRADYCARDIA: ICD-10-CM

## 2024-08-06 DIAGNOSIS — I45.4 BUNDLE BRANCH BLOCK: ICD-10-CM

## 2024-08-06 DIAGNOSIS — I44.1 WENCKEBACH: ICD-10-CM

## 2024-08-06 NOTE — TELEPHONE ENCOUNTER
Nasima,    I have a [employer] moniter mailing to this patient, also made a 3 week phone visit for him with you.

## 2024-08-12 ENCOUNTER — TELEPHONE (OUTPATIENT)
Dept: CARDIOLOGY CLINIC | Age: 89
End: 2024-08-12

## 2024-08-12 NOTE — TELEPHONE ENCOUNTER
Patients daughter called to advise they have received home zio monitor and wanted to know if they could put it on. Advised yes they can put zio on at home. She voiced understanding.

## 2024-08-15 ENCOUNTER — TELEPHONE (OUTPATIENT)
Dept: UROLOGY | Age: 89
End: 2024-08-15

## 2024-08-15 NOTE — TELEPHONE ENCOUNTER
Dr. Bradley Garcia's office called and needs records of the last 3 months of patients cystoscopy's done. Please fax to: [phone]

## 2024-08-19 DIAGNOSIS — E78.2 MIXED HYPERLIPIDEMIA: ICD-10-CM

## 2024-08-19 RX ORDER — SIMVASTATIN 40 MG
TABLET ORAL
Qty: 90 TABLET | Refills: 2 | Status: SHIPPED | OUTPATIENT
Start: 2024-08-19

## 2024-08-20 DIAGNOSIS — R42 DIZZINESS: Primary | ICD-10-CM

## 2024-08-26 ENCOUNTER — SCHEDULED TELEPHONE ENCOUNTER (OUTPATIENT)
Dept: CARDIOLOGY CLINIC | Age: 89
End: 2024-08-26
Payer: MEDICARE

## 2024-08-26 DIAGNOSIS — I44.7 LBBB (LEFT BUNDLE BRANCH BLOCK): ICD-10-CM

## 2024-08-26 DIAGNOSIS — I10 ESSENTIAL HYPERTENSION: ICD-10-CM

## 2024-08-26 DIAGNOSIS — R00.1 BRADYCARDIA: ICD-10-CM

## 2024-08-26 DIAGNOSIS — E78.2 MIXED HYPERLIPIDEMIA: ICD-10-CM

## 2024-08-26 DIAGNOSIS — I25.10 CORONARY ARTERY DISEASE INVOLVING NATIVE HEART WITHOUT ANGINA PECTORIS, UNSPECIFIED VESSEL OR LESION TYPE: Primary | ICD-10-CM

## 2024-08-26 DIAGNOSIS — I44.1 WENCKEBACH: ICD-10-CM

## 2024-08-26 DIAGNOSIS — E87.79 OTHER FLUID OVERLOAD: ICD-10-CM

## 2024-08-26 DIAGNOSIS — Z95.1 HX OF CABG: ICD-10-CM

## 2024-08-26 DIAGNOSIS — I44.0 FIRST DEGREE ATRIOVENTRICULAR BLOCK: ICD-10-CM

## 2024-08-26 DIAGNOSIS — I51.89 DIASTOLIC DYSFUNCTION: ICD-10-CM

## 2024-08-26 PROCEDURE — 99441 PR PHYS/QHP TELEPHONE EVALUATION 5-10 MIN: CPT | Performed by: NURSE PRACTITIONER

## 2024-08-26 PROCEDURE — 93000 ELECTROCARDIOGRAM COMPLETE: CPT | Performed by: NURSE PRACTITIONER

## 2024-08-26 RX ORDER — CLOPIDOGREL BISULFATE 75 MG
TABLET ORAL
Qty: 36 TABLET | Refills: 5
Start: 2024-08-26

## 2024-08-26 RX ORDER — BUMETANIDE 1 MG/1
0.5 TABLET ORAL DAILY
Qty: 45 TABLET | Refills: 3 | Status: SHIPPED | OUTPATIENT
Start: 2024-08-26

## 2024-08-26 RX ORDER — CARVEDILOL 6.25 MG/1
6.25 TABLET ORAL DAILY
Qty: 1 TABLET | Refills: 0
Start: 2024-08-26 | End: 2024-08-26 | Stop reason: SDUPTHER

## 2024-08-26 RX ORDER — CARVEDILOL 6.25 MG/1
6.25 TABLET ORAL DAILY
Qty: 90 TABLET | Refills: 3 | Status: SHIPPED | OUTPATIENT
Start: 2024-08-26

## 2024-09-03 DIAGNOSIS — R00.1 BRADYCARDIA: Primary | ICD-10-CM

## 2024-09-03 DIAGNOSIS — R42 DIZZINESS: ICD-10-CM

## 2024-09-23 ENCOUNTER — PROCEDURE VISIT (OUTPATIENT)
Dept: UROLOGY | Age: 89
End: 2024-09-23
Payer: MEDICARE

## 2024-09-23 VITALS — TEMPERATURE: 97.2 F | WEIGHT: 216 LBS | HEIGHT: 68 IN | BODY MASS INDEX: 32.74 KG/M2

## 2024-09-23 DIAGNOSIS — Z85.51 HISTORY OF BLADDER CANCER: ICD-10-CM

## 2024-09-23 LAB
APPEARANCE FLUID: CLEAR
BILIRUBIN, POC: NORMAL
BLOOD URINE, POC: NORMAL
CLARITY, POC: CLEAR
COLOR, POC: YELLOW
GLUCOSE URINE, POC: NORMAL MG/DL
KETONES, POC: NORMAL MG/DL
LEUKOCYTE EST, POC: NORMAL
NITRITE, POC: NORMAL
PH, POC: 5
PROTEIN, POC: NORMAL MG/DL
SPECIFIC GRAVITY, POC: 1.01
UROBILINOGEN, POC: 0.2 MG/DL

## 2024-09-23 PROCEDURE — 81002 URINALYSIS NONAUTO W/O SCOPE: CPT | Performed by: UROLOGY

## 2024-09-23 PROCEDURE — 52000 CYSTOURETHROSCOPY: CPT | Performed by: UROLOGY

## 2024-10-10 DIAGNOSIS — R42 DIZZINESS: Primary | ICD-10-CM

## 2024-10-10 DIAGNOSIS — I25.10 CORONARY ARTERY DISEASE INVOLVING NATIVE HEART WITHOUT ANGINA PECTORIS, UNSPECIFIED VESSEL OR LESION TYPE: ICD-10-CM

## 2024-10-10 RX ORDER — CLOPIDOGREL BISULFATE 75 MG
TABLET ORAL
Qty: 36 TABLET | Refills: 3 | Status: SHIPPED | OUTPATIENT
Start: 2024-10-10

## 2024-10-15 DIAGNOSIS — I25.10 CORONARY ARTERY DISEASE INVOLVING NATIVE HEART WITHOUT ANGINA PECTORIS, UNSPECIFIED VESSEL OR LESION TYPE: ICD-10-CM

## 2024-10-15 RX ORDER — CLOPIDOGREL BISULFATE 75 MG/1
TABLET ORAL
Qty: 90 TABLET | Refills: 3 | OUTPATIENT
Start: 2024-10-15

## 2024-10-15 RX ORDER — CLOPIDOGREL BISULFATE 75 MG/1
75 TABLET ORAL
Qty: 36 TABLET | Refills: 3 | Status: SHIPPED | OUTPATIENT
Start: 2024-10-16

## 2024-10-28 ENCOUNTER — TELEPHONE (OUTPATIENT)
Dept: CARDIOLOGY CLINIC | Age: 89
End: 2024-10-28

## 2024-10-28 ENCOUNTER — SCHEDULED TELEPHONE ENCOUNTER (OUTPATIENT)
Dept: CARDIOLOGY CLINIC | Age: 89
End: 2024-10-28

## 2024-10-28 DIAGNOSIS — I44.0 FIRST DEGREE ATRIOVENTRICULAR BLOCK: ICD-10-CM

## 2024-10-28 DIAGNOSIS — I25.10 CORONARY ARTERY DISEASE INVOLVING NATIVE HEART WITHOUT ANGINA PECTORIS, UNSPECIFIED VESSEL OR LESION TYPE: Primary | ICD-10-CM

## 2024-10-28 DIAGNOSIS — I51.89 DIASTOLIC DYSFUNCTION: ICD-10-CM

## 2024-10-28 DIAGNOSIS — R00.1 BRADYCARDIA: ICD-10-CM

## 2024-10-28 DIAGNOSIS — I44.7 LBBB (LEFT BUNDLE BRANCH BLOCK): ICD-10-CM

## 2024-10-28 DIAGNOSIS — E78.2 MIXED HYPERLIPIDEMIA: ICD-10-CM

## 2024-10-28 DIAGNOSIS — Z95.1 HX OF CABG: ICD-10-CM

## 2024-10-28 DIAGNOSIS — I10 ESSENTIAL HYPERTENSION: ICD-10-CM

## 2024-10-28 RX ORDER — IPRATROPIUM BROMIDE AND ALBUTEROL SULFATE 2.5; .5 MG/3ML; MG/3ML
SOLUTION RESPIRATORY (INHALATION)
COMMUNITY
Start: 2024-10-16

## 2024-10-28 NOTE — TELEPHONE ENCOUNTER
Spoke to the patients daughter and gave results for the 2nd monitor   the patients caregiver voiced understnding

## 2024-10-28 NOTE — TELEPHONE ENCOUNTER
Reviewed Zio report.  Date 8/13-8/23/24  Monitor done after decreasing Coreg to 6.25 mg daily.    Monitor showed underlying rhythm NSR.  Average heart rate 62.  Minimum 29 at 5:00 am.  Max 84 bpm.  No AF, VT, pauses or SVT.  First degree AVB, BBB an Wenckebach noted.    Will stop Coreg.  Plan to notify daughter with instructions.

## 2024-10-28 NOTE — PROGRESS NOTES
lungs every 4 hours as needed for Wheezing Yes Jamie Lundberg MD   Multiple Vitamins-Minerals (CENTRUM SILVER) TABS Take 1 tablet by mouth daily Yes Jamie Lundberg MD   omeprazole (PRILOSEC) 20 MG delayed release capsule Take 1 capsule by mouth daily Yes Jamie Lundberg MD   PLAVIX 75 MG tablet Take 75 mg PO three days weekly on   Patient not taking: Reported on 10/28/2024  Aleyda Ayala APRN   triamcinolone (KENALOG) 0.1 % ointment Apply topically 2 times daily Apply topically 2 times daily.  Patient not taking: Reported on 10/28/2024  Jamie Lundberg MD   traMADol (ULTRAM) 50 MG tablet Take 1 tablet by mouth every 6 hours as needed for Pain.  Patient not taking: Reported on 10/28/2024  Jamie Lundberg MD       Social History     Tobacco Use    Smoking status: Former     Current packs/day: 0.00     Average packs/day: 1.5 packs/day for 40.0 years (60.0 ttl pk-yrs)     Types: Cigarettes     Start date: 1956     Quit date: 1996     Years since quittin.3    Smokeless tobacco: Never   Vaping Use    Vaping status: Never Used   Substance Use Topics    Alcohol use: No    Drug use: No        REVIEW OF SYSTEMS:  Constitutional - no appetite change, or unexpected weight change. No fever, chills or diaphoresis.  No significant change in activity level or new onset of fatigue.   HEENT - no significant rhinorrhea or epistaxis. No tinnitus or significant hearing loss.   Eyes - no sudden vision change or amaurosis. No corneal arcus, xantholasma, subconjunctival hemorrhage or discharge.  Respiratory - no significant wheezing, stridor, apnea or cough. + SOA.  Cardiovascular - no exertional chest pain to suggest myocardial ischemia.  No orthopnea or PND.  No sensation of sustained arrythmia.  No occurrence of slow heart rate.  No palpitations.  No claudication.    Gastrointestinal - no abdominal swelling or pain. No blood in stool. No severe constipation, diarrhea, nausea, or

## 2024-11-14 DIAGNOSIS — I10 ESSENTIAL HYPERTENSION: ICD-10-CM

## 2024-11-14 RX ORDER — POTASSIUM CHLORIDE 1500 MG/1
TABLET, EXTENDED RELEASE ORAL
Qty: 90 TABLET | Refills: 3 | Status: SHIPPED | OUTPATIENT
Start: 2024-11-14

## 2024-11-23 DIAGNOSIS — I10 ESSENTIAL HYPERTENSION: ICD-10-CM

## 2024-11-25 RX ORDER — RAMIPRIL 10 MG/1
CAPSULE ORAL
Qty: 90 CAPSULE | Refills: 3 | Status: SHIPPED | OUTPATIENT
Start: 2024-11-25

## 2024-12-16 ENCOUNTER — PROCEDURE VISIT (OUTPATIENT)
Dept: UROLOGY | Age: 89
End: 2024-12-16
Payer: MEDICARE

## 2024-12-16 VITALS — BODY MASS INDEX: 32.74 KG/M2 | WEIGHT: 216 LBS | HEIGHT: 68 IN | TEMPERATURE: 98.7 F

## 2024-12-16 DIAGNOSIS — Z85.51 HISTORY OF BLADDER CANCER: ICD-10-CM

## 2024-12-16 LAB
APPEARANCE FLUID: CLEAR
BILIRUBIN, POC: NORMAL
BLOOD URINE, POC: NORMAL
CLARITY, POC: CLEAR
COLOR, POC: YELLOW
GLUCOSE URINE, POC: NORMAL MG/DL
KETONES, POC: NORMAL MG/DL
LEUKOCYTE EST, POC: NORMAL
NITRITE, POC: NORMAL
PH, POC: 5.5
PROTEIN, POC: NORMAL MG/DL
SPECIFIC GRAVITY, POC: 1.02
UROBILINOGEN, POC: 0.2 MG/DL

## 2024-12-16 PROCEDURE — 52000 CYSTOURETHROSCOPY: CPT | Performed by: UROLOGY

## 2024-12-16 PROCEDURE — 81002 URINALYSIS NONAUTO W/O SCOPE: CPT | Performed by: UROLOGY

## 2024-12-16 NOTE — PROGRESS NOTES
POC Neg     pH, UA 5.5     Protein, UA  mg/dL mg/dL    Urobilinogen, UA 0.2 mg/dL    Leukocytes, UA Neg     Nitrite, UA Neg     Appearance, Fluid Clear Clear, Slightly Cloudy           1. History of bladder cancer  No recurrence seen today.  - CYSTOURETHROSCOPY  - Cytology, Non-Gyn  - Cytology, Non-Gyn  - POCT Urinalysis no Micro  - Cytology, Non-Gyn; Future  - CYSTOURETHROSCOPY; Future      Orders Placed This Encounter   Procedures    CYSTOURETHROSCOPY     Cystoscopy in 3 months     Standing Status:   Future     Standing Expiration Date:   12/16/2025    Cytology, Non-Gyn     Order Specific Question:   PREVIOUS BIOPSY     Answer:   Yes     Order Specific Question:   PREOP DIAGNOSIS     Answer:   History of Bladder Cancer     Order Specific Question:   FROZEN SECTION - NO OR YES/SPECIMEN     Answer:   No    Cytology, Non-Gyn     Prior to next visit in 3 mos     Standing Status:   Future     Standing Expiration Date:   12/16/2025     Order Specific Question:   PREVIOUS BIOPSY     Answer:   Yes     Order Specific Question:   PREOP DIAGNOSIS     Answer:   History of bladder cancer     Order Specific Question:   FROZEN SECTION - NO OR YES/SPECIMEN     Answer:   No    POCT Urinalysis no Micro       Return in about 3 months (around 3/16/2025) for Urine Cytology prior to next visit, Cystoscopy on next visit.        Eugene Avery MD

## 2024-12-31 ENCOUNTER — TELEPHONE (OUTPATIENT)
Dept: CARDIOLOGY CLINIC | Age: 89
End: 2024-12-31

## 2024-12-31 DIAGNOSIS — N13.8 BPH WITH OBSTRUCTION/LOWER URINARY TRACT SYMPTOMS: ICD-10-CM

## 2024-12-31 DIAGNOSIS — N40.1 BPH WITH OBSTRUCTION/LOWER URINARY TRACT SYMPTOMS: ICD-10-CM

## 2024-12-31 RX ORDER — TAMSULOSIN HYDROCHLORIDE 0.4 MG/1
CAPSULE ORAL DAILY
Qty: 90 CAPSULE | Refills: 3 | Status: SHIPPED | OUTPATIENT
Start: 2024-12-31

## 2024-12-31 NOTE — TELEPHONE ENCOUNTER
Called 1x and LVM to patient regarding upcoming appt on 1/20 with Nasima Villarreal. Provider will be out of office and appt needs to be rescheduled. If patient calls back, please reschedule to Nasima's next available (3/31 or 4/7) in a 15 min slot. 12/31/24 SL

## 2025-01-02 ENCOUNTER — TELEPHONE (OUTPATIENT)
Dept: CARDIOLOGY CLINIC | Age: 89
End: 2025-01-02

## 2025-01-02 NOTE — TELEPHONE ENCOUNTER
Called 2x and LVM to patients daughter regarding his upcoming appt with Nasima Villarreal on 1/20. Provider will be out of office and appt needs to be rescheduled. If patient or his daughter calls back, please move appt to Nasima's next available 15 min slot. 1/2/25 SL

## 2025-02-18 DIAGNOSIS — E78.2 MIXED HYPERLIPIDEMIA: ICD-10-CM

## 2025-02-18 RX ORDER — SIMVASTATIN 40 MG
TABLET ORAL
Qty: 90 TABLET | Refills: 0 | Status: SHIPPED | OUTPATIENT
Start: 2025-02-18

## 2025-03-20 ENCOUNTER — PROCEDURE VISIT (OUTPATIENT)
Dept: UROLOGY | Age: 89
End: 2025-03-20
Payer: MEDICARE

## 2025-03-20 VITALS — BODY MASS INDEX: 30.37 KG/M2 | HEIGHT: 68 IN | WEIGHT: 200.4 LBS | TEMPERATURE: 97.7 F

## 2025-03-20 DIAGNOSIS — E87.79 OTHER FLUID OVERLOAD: ICD-10-CM

## 2025-03-20 DIAGNOSIS — Z85.51 HISTORY OF BLADDER CANCER: Primary | ICD-10-CM

## 2025-03-20 LAB
BACTERIA URINE, POC: ABNORMAL
BILIRUBIN URINE: 0 MG/DL
BLOOD, URINE: POSITIVE
CASTS URINE, POC: ABNORMAL
CLARITY, UA: CLEAR
COLOR, UA: YELLOW
CRYSTALS URINE, POC: ABNORMAL
EPI CELLS URINE, POC: ABNORMAL
GLUCOSE URINE: ABNORMAL
KETONES, URINE: POSITIVE
LEUKOCYTE EST, POC: NEGATIVE
NITRITE, URINE: NEGATIVE
PH UA: 5.5 (ref 4.5–8)
PROTEIN UA: POSITIVE
RBC URINE, POC: ABNORMAL
SPECIFIC GRAVITY UA: 1.03 (ref 1–1.03)
UROBILINOGEN, URINE: ABNORMAL
WBC URINE, POC: 4
YEAST URINE, POC: ABNORMAL

## 2025-03-20 PROCEDURE — 52000 CYSTOURETHROSCOPY: CPT | Performed by: UROLOGY

## 2025-03-20 PROCEDURE — 81001 URINALYSIS AUTO W/SCOPE: CPT | Performed by: UROLOGY

## 2025-03-20 RX ORDER — BUMETANIDE 1 MG/1
0.5 TABLET ORAL DAILY
Qty: 45 TABLET | Refills: 3 | Status: SHIPPED | OUTPATIENT
Start: 2025-03-20

## 2025-03-20 RX ORDER — CELECOXIB 100 MG/1
CAPSULE ORAL
COMMUNITY
Start: 2025-03-19

## 2025-03-20 NOTE — PROGRESS NOTES
Yellow     Clarity, UA Clear Clear    Glucose, Ur Neg     Bilirubin Urine 0 mg/dL    Ketones, Urine Positive     Specific Gravity, UA 1.030 1.005 - 1.030    Blood, Urine Positive     pH, UA 5.5 4.5 - 8.0    Protein, UA Positive (A) Negative    Nitrite, Urine Negative     Leukocytes, UA Negative     Urobilinogen, Urine 0.2 mg/dL     RBC Urine, POC      WBC Urine, POC 4     Bacteria Urine, POC 2-4     yeast urine, poc      Casts Urine, POC      Epi Cells Urine, POC      crystals urine, poc             1. History of bladder cancer  Cystoscopy today shows no obvious recurrence.  Continue follow-up in 3 months  - Cytology, Non-Gyn  - Cystoscopy  - Cytology, Non-Gyn; Future  - CYSTOURETHROSCOPY; Future  - POCT Urinalysis Dipstick w/ Micro (Auto)      Orders Placed This Encounter   Procedures    CYSTOURETHROSCOPY     Cystoscopy in 3 months     Standing Status:   Future     Expected Date:   6/18/2025     Expiration Date:   3/20/2026    Cytology, Non-Gyn     PREVIOUS BIOPSY:   Yes     PREOP DIAGNOSIS:   Hoistory of Bladder Cancer     FROZEN SECTION - NO OR YES/SPECIMEN:   No    Cytology, Non-Gyn     Prior to next visit in 3 mos     Standing Status:   Future     Expected Date:   6/20/2025     Expiration Date:   3/20/2026     PREVIOUS BIOPSY:   Yes     PREOP DIAGNOSIS:   History of bladder cancer     FROZEN SECTION - NO OR YES/SPECIMEN:   No    POCT Urinalysis Dipstick w/ Micro (Auto)    Cystoscopy       Return in about 3 months (around 6/20/2025) for office visit after xray study, Cystoscopy on next visit.        Eugene Avery MD

## 2025-04-14 ENCOUNTER — TELEPHONE (OUTPATIENT)
Dept: CARDIOLOGY CLINIC | Age: 89
End: 2025-04-14

## 2025-04-14 ENCOUNTER — HOSPITAL ENCOUNTER (OUTPATIENT)
Dept: VASCULAR LAB | Age: 89
Discharge: HOME | End: 2025-04-16
Payer: MEDICARE

## 2025-04-14 ENCOUNTER — RESULTS FOLLOW-UP (OUTPATIENT)
Dept: CARDIOLOGY CLINIC | Age: 89
End: 2025-04-14

## 2025-04-14 ENCOUNTER — OFFICE VISIT (OUTPATIENT)
Dept: CARDIOLOGY CLINIC | Age: 89
End: 2025-04-14
Payer: MEDICARE

## 2025-04-14 VITALS
OXYGEN SATURATION: 98 % | WEIGHT: 196 LBS | DIASTOLIC BLOOD PRESSURE: 66 MMHG | SYSTOLIC BLOOD PRESSURE: 128 MMHG | BODY MASS INDEX: 29.7 KG/M2 | HEIGHT: 68 IN | HEART RATE: 77 BPM

## 2025-04-14 DIAGNOSIS — I25.10 CORONARY ARTERY DISEASE INVOLVING NATIVE HEART WITHOUT ANGINA PECTORIS, UNSPECIFIED VESSEL OR LESION TYPE: Primary | ICD-10-CM

## 2025-04-14 DIAGNOSIS — I51.89 DIASTOLIC DYSFUNCTION: ICD-10-CM

## 2025-04-14 DIAGNOSIS — E78.2 MIXED HYPERLIPIDEMIA: ICD-10-CM

## 2025-04-14 DIAGNOSIS — I44.7 LBBB (LEFT BUNDLE BRANCH BLOCK): ICD-10-CM

## 2025-04-14 DIAGNOSIS — R42 DIZZINESS: ICD-10-CM

## 2025-04-14 DIAGNOSIS — Z95.1 HX OF CABG: ICD-10-CM

## 2025-04-14 DIAGNOSIS — I10 ESSENTIAL HYPERTENSION: ICD-10-CM

## 2025-04-14 LAB
ECHO BSA: 2.07 M2
VAS LEFT ARM BP DIA: 74 MMHG
VAS LEFT ARM BP: 138 MMHG
VAS LEFT CCA MID EDV: 12.3 CM/S
VAS LEFT CCA MID PSV: 92 CM/S
VAS LEFT CCA PROX EDV: 11.7 CM/S
VAS LEFT CCA PROX PSV: 75 CM/S
VAS LEFT ECA PSV: 127 CM/S
VAS LEFT ICA DIST EDV: 13 CM/S
VAS LEFT ICA DIST PSV: 57 CM/S
VAS LEFT ICA MID EDV: 20.5 CM/S
VAS LEFT ICA MID PSV: 89.7 CM/S
VAS LEFT ICA PROX EDV: 18.8 CM/S
VAS LEFT ICA PROX PSV: 104 CM/S
VAS LEFT VERTEBRAL EDV: 14.1 CM/S
VAS LEFT VERTEBRAL PSV: 57.7 CM/S
VAS RIGHT ARM BP DIA: 74 MMHG
VAS RIGHT ARM BP: 132 MMHG
VAS RIGHT CCA MID EDV: 9.97 CM/S
VAS RIGHT CCA MID PSV: 69.2 CM/S
VAS RIGHT CCA PROX EDV: 10.6 CM/S
VAS RIGHT CCA PROX PSV: 87.9 CM/S
VAS RIGHT ECA PSV: 145 CM/S
VAS RIGHT ICA DIST EDV: 13 CM/S
VAS RIGHT ICA DIST PSV: 59.3 CM/S
VAS RIGHT ICA MID EDV: 18.8 CM/S
VAS RIGHT ICA MID PSV: 89.1 CM/S
VAS RIGHT ICA PROX EDV: 16.4 CM/S
VAS RIGHT ICA PROX PSV: 57.5 CM/S
VAS RIGHT VERTEBRAL EDV: 11 CM/S
VAS RIGHT VERTEBRAL PSV: 52.2 CM/S

## 2025-04-14 PROCEDURE — 1159F MED LIST DOCD IN RCRD: CPT | Performed by: NURSE PRACTITIONER

## 2025-04-14 PROCEDURE — 93880 EXTRACRANIAL BILAT STUDY: CPT | Performed by: SURGERY

## 2025-04-14 PROCEDURE — G8417 CALC BMI ABV UP PARAM F/U: HCPCS | Performed by: NURSE PRACTITIONER

## 2025-04-14 PROCEDURE — 1160F RVW MEDS BY RX/DR IN RCRD: CPT | Performed by: NURSE PRACTITIONER

## 2025-04-14 PROCEDURE — 99214 OFFICE O/P EST MOD 30 MIN: CPT | Performed by: NURSE PRACTITIONER

## 2025-04-14 PROCEDURE — 1123F ACP DISCUSS/DSCN MKR DOCD: CPT | Performed by: NURSE PRACTITIONER

## 2025-04-14 PROCEDURE — 93880 EXTRACRANIAL BILAT STUDY: CPT

## 2025-04-14 PROCEDURE — 1036F TOBACCO NON-USER: CPT | Performed by: NURSE PRACTITIONER

## 2025-04-14 PROCEDURE — G8427 DOCREV CUR MEDS BY ELIG CLIN: HCPCS | Performed by: NURSE PRACTITIONER

## 2025-04-14 RX ORDER — CARVEDILOL 3.12 MG/1
3.12 TABLET ORAL 2 TIMES DAILY
Qty: 180 TABLET | Refills: 3 | Status: SHIPPED | OUTPATIENT
Start: 2025-04-14

## 2025-04-14 NOTE — TELEPHONE ENCOUNTER
Called and spoke with patient's daughter, she advised she is fairly certain he has been taking coreg and it was never stopped.  She has a soccer game to attend and then will be going by to check on him and will call back tomorrow to let us know for sure.

## 2025-04-14 NOTE — PATIENT INSTRUCTIONS
12.5  © 9929-5679 [employer].   Care instructions adapted under license by [employer]. If you have questions about a medical condition or this instruction, always ask your healthcare professional. [employer] disclaims any warranty or liability for your use of this information.

## 2025-04-14 NOTE — PROGRESS NOTES
[facility]  [address], [address], [place]  [address]  [phone] office  [phone] fax      OFFICE VISIT:  2025    Manuel Howard - : 1935  Reason For Visit:  Manuel is a 89 y.o. male who is here for 6 Month Follow-Up (Patient has chest soreness on the right side, EKG and chest xray were normal at [place]. Will request records. )    History:   Diagnosis Orders   1. Coronary artery disease involving native heart without angina pectoris, unspecified vessel or lesion type        2. Hx of CABG        3. Essential hypertension        4. LBBB (left bundle branch block)        5. Mixed hyperlipidemia        6. Diastolic dysfunction          The patient presents today for cardiology follow up.  He is an 89 year old with the following history:  1.  Coronary artery disease, prior three-vessel CABG  with LIMA to distal LAD, SVG to OM, SVG to diagonal (patent grafts by catheterization 10/15/2015), PCI 10/15/2015 to proximal and mid RCA  with restenosis and PCI to mid RCA (3.0 x 15 mm Xience), distal RCA (3.0 x 8 mm Xience), normal LV ejection fraction, occluded circumflex and LAD, LBBB on EKG.  2.  Longstanding prior tobacco use with COPD, continues home oxygen 2 L/min.  3.  Hypertension.  4.  Bladder cancer status post TURBT 2023    The patient continues to live independently and functional status has not changed.  He reports no falls. The patient reports that right chest wall is tender to touch.  He reports sometimes the discomfort is sharp \"I hold pressure on my chest and it feels better.  Dr. Garcia ordered and EKG and chest x-ray and reported as all okay.  The patient reports today \"sometimes at night, I see black spots or only half of my vision, like the bottom is gone.  I went to the eye doctor and everything was okay.  Patient reports no syncope, near syncope or dizziness.  24 Zio after decreasing Coreg to

## 2025-05-20 RX ORDER — ISOSORBIDE MONONITRATE 30 MG/1
30 TABLET, EXTENDED RELEASE ORAL NIGHTLY
Qty: 90 TABLET | Refills: 3 | Status: SHIPPED | OUTPATIENT
Start: 2025-05-20

## 2025-05-21 DIAGNOSIS — E78.2 MIXED HYPERLIPIDEMIA: ICD-10-CM

## 2025-05-21 RX ORDER — SIMVASTATIN 40 MG
40 TABLET ORAL NIGHTLY
Qty: 90 TABLET | Refills: 3 | Status: SHIPPED | OUTPATIENT
Start: 2025-05-21

## 2025-06-02 DIAGNOSIS — N40.1 BPH WITH OBSTRUCTION/LOWER URINARY TRACT SYMPTOMS: ICD-10-CM

## 2025-06-02 DIAGNOSIS — N13.8 BPH WITH OBSTRUCTION/LOWER URINARY TRACT SYMPTOMS: ICD-10-CM

## 2025-06-02 RX ORDER — FINASTERIDE 5 MG/1
5 TABLET, FILM COATED ORAL DAILY
Qty: 30 TABLET | Refills: 0 | Status: SHIPPED | OUTPATIENT
Start: 2025-06-02

## 2025-06-23 ENCOUNTER — PROCEDURE VISIT (OUTPATIENT)
Dept: UROLOGY | Age: 89
End: 2025-06-23
Payer: MEDICARE

## 2025-06-23 VITALS — TEMPERATURE: 98.6 F | BODY MASS INDEX: 29.7 KG/M2 | HEIGHT: 68 IN | WEIGHT: 196 LBS

## 2025-06-23 DIAGNOSIS — C67.3 MALIGNANT NEOPLASM OF ANTERIOR WALL OF URINARY BLADDER (HCC): ICD-10-CM

## 2025-06-23 DIAGNOSIS — Z85.51 HISTORY OF BLADDER CANCER: Primary | ICD-10-CM

## 2025-06-23 PROCEDURE — 1159F MED LIST DOCD IN RCRD: CPT | Performed by: UROLOGY

## 2025-06-23 PROCEDURE — 52000 CYSTOURETHROSCOPY: CPT | Performed by: UROLOGY

## 2025-06-23 PROCEDURE — 81002 URINALYSIS NONAUTO W/O SCOPE: CPT | Performed by: UROLOGY

## 2025-06-23 PROCEDURE — G8427 DOCREV CUR MEDS BY ELIG CLIN: HCPCS | Performed by: UROLOGY

## 2025-06-23 PROCEDURE — 99214 OFFICE O/P EST MOD 30 MIN: CPT | Performed by: UROLOGY

## 2025-06-23 PROCEDURE — 1036F TOBACCO NON-USER: CPT | Performed by: UROLOGY

## 2025-06-23 PROCEDURE — 1123F ACP DISCUSS/DSCN MKR DOCD: CPT | Performed by: UROLOGY

## 2025-06-23 PROCEDURE — G8417 CALC BMI ABV UP PARAM F/U: HCPCS | Performed by: UROLOGY

## 2025-06-23 PROCEDURE — 51798 US URINE CAPACITY MEASURE: CPT | Performed by: UROLOGY

## 2025-06-23 NOTE — H&P (VIEW-ONLY)
Manuel Howard is a 89 y.o. male who presents today   Chief Complaint   Patient presents with    Cystoscopy     I am here today for my Cystoscopy.        BLADDER CANCER  Patient was first diagnosed with bladder cancer approximately 2 years(s) ago.   5/24/2023  Last Recurrence: He has not had a recurrence his initial diagnosis on 5/24/2023  Stage of bladder cancer at last recurrence: 8130/2 - Papillary transitional cell carcinoma, non-invasive, stage TA  Grade: low grade  Last urinary cytology/FISH results: negative; Date: 3/20/2025  Hematuria? Negative  Last upper tract study: 2 year(s) ago.  Study was a CT urogram done on 3/30/2023    Patient has BPH on combination medical therapy with tamsulosin and finasteride.  His symptoms have been stable        Cystoscopy Operative Note (6/23/25)  Surgeon: Eugene Avery MD  Anesthesia: Urethral 2% Xylocaine   Indications: Bladder cancer  Position: Supine    Findings:   The patient was prepped and draped in the usual sterile fashion.  The flexible cystoscope was advanced through the urethra and into the bladder under direct vision.  The bladder was thoroughly inspected and the following was noted:    Residual Urine: moderate  Urethra: normal appearing urethra with no masses, tenderness or lesions  Prostate: completely obstructing lateral lobes of prostate large by lobar; median lobe present? no.    Bladder: Erythematous patch approximate 1 cm with some pseudopapillary changes right in the anterior bladder dome at 12:00 no bladder diverticulum.  There was mild trabeculation noted.  Suspicious lesion is noted  Ureters: Clear efflux from both ureters.  Orifices with normal configuration and location.    The cystoscope was removed.  The patient tolerated the procedure well.  The patient was given a post procedure instructions and follow up.      Past Medical History:   Diagnosis Date    Back pain     hx of fall with fx 10 yr ago    Benign hypertension     Bladder cancer (HCC)

## 2025-06-25 ENCOUNTER — PREP FOR PROCEDURE (OUTPATIENT)
Dept: UROLOGY | Age: 89
End: 2025-06-25

## 2025-06-25 ENCOUNTER — TELEPHONE (OUTPATIENT)
Dept: CARDIOLOGY CLINIC | Age: 89
End: 2025-06-25

## 2025-06-25 DIAGNOSIS — C67.3 MALIGNANT NEOPLASM OF ANTERIOR WALL OF URINARY BLADDER (HCC): Primary | ICD-10-CM

## 2025-06-25 NOTE — TELEPHONE ENCOUNTER
Called 1x and LVM to r/s appt. Dr. Maria will be out of the office. Need to r/s w/Nasima Villarreal for the first available or after November 2025 w/Dr. Maria. kdw 6/25/2025

## 2025-06-26 ENCOUNTER — TELEPHONE (OUTPATIENT)
Dept: CARDIOLOGY CLINIC | Age: 89
End: 2025-06-26

## 2025-06-26 NOTE — TELEPHONE ENCOUNTER
Date: 7/16/25    Cardiologist: Crow    Procedure: Cysto Biopsy & Fulguration    Surgeon: Bennie    Last Office Visit: 4/14/25  Reason for office visit and medical concerns addressed at this office visit: cad, htn, hyperlipidemia, s/p CABG, copd    Testing Performed and Date of Service:  1/8/24 Echo  Normal left ventricular cavity with overall normal systolic function. EF   70%   Thickened left sided valves without stenosis   Trivial TR   Pericardium, ascending aorta, and IVC normal    RCRI = 0.9   METs 4    Current Medications: flomax, simvastatin, ramipril, percocet, omeprazole, levothyroxine, imdur, glipizide, finasteride, plavix, zyrtec, celebrex, coreg, bumex, aspirin    Is the patient currently taking an anticoagulant? If so, what is the diagnosis the patient has been given to warrant the need for the anticoagulant? Plavix for CABG 1996 JOYA 2003    Additional Notes: requesting cardiac clearance and to hold plavix and aspirin

## 2025-06-27 ENCOUNTER — TELEPHONE (OUTPATIENT)
Dept: CARDIOLOGY CLINIC | Age: 89
End: 2025-06-27

## 2025-06-27 NOTE — TELEPHONE ENCOUNTER
Called 2x and LVM to r/s appt. Dr. Maria will be out of the office. Need to r/s w/Nasima Villarreal for the first available or after November 2025 w/Dr. Maria. kdw 6/27/2025

## 2025-06-29 DIAGNOSIS — N40.1 BPH WITH OBSTRUCTION/LOWER URINARY TRACT SYMPTOMS: ICD-10-CM

## 2025-06-29 DIAGNOSIS — N13.8 BPH WITH OBSTRUCTION/LOWER URINARY TRACT SYMPTOMS: ICD-10-CM

## 2025-06-30 RX ORDER — FINASTERIDE 5 MG/1
5 TABLET, FILM COATED ORAL DAILY
Qty: 90 TABLET | Refills: 1 | Status: SHIPPED | OUTPATIENT
Start: 2025-06-30

## 2025-06-30 NOTE — TELEPHONE ENCOUNTER
Kirsten Garcia,  Okay to cardiac risk stratify and to hold Plavix and ASA from a cardiac standpoint.  Thanks Nasima

## 2025-07-07 ENCOUNTER — HOSPITAL ENCOUNTER (OUTPATIENT)
Dept: PREADMISSION TESTING | Age: 89
Discharge: HOME | End: 2025-07-11
Payer: MEDICARE

## 2025-07-07 VITALS — BODY MASS INDEX: 28.34 KG/M2 | HEIGHT: 68 IN | WEIGHT: 187 LBS

## 2025-07-07 DIAGNOSIS — C67.3 MALIGNANT NEOPLASM OF ANTERIOR WALL OF URINARY BLADDER (HCC): ICD-10-CM

## 2025-07-07 LAB
ANION GAP SERPL CALCULATED.3IONS-SCNC: 13 MMOL/L (ref 8–16)
BASOPHILS # BLD: 0.1 K/UL (ref 0–0.2)
BASOPHILS NFR BLD: 1.6 % (ref 0–1)
BUN SERPL-MCNC: 32 MG/DL (ref 8–23)
CALCIUM SERPL-MCNC: 9.6 MG/DL (ref 8.8–10.2)
CHLORIDE SERPL-SCNC: 104 MMOL/L (ref 98–107)
CO2 SERPL-SCNC: 22 MMOL/L (ref 22–29)
CREAT SERPL-MCNC: 1.6 MG/DL (ref 0.7–1.2)
EOSINOPHIL # BLD: 0.2 K/UL (ref 0–0.6)
EOSINOPHIL NFR BLD: 2.5 % (ref 0–5)
ERYTHROCYTE [DISTWIDTH] IN BLOOD BY AUTOMATED COUNT: 14.3 % (ref 11.5–14.5)
GLUCOSE SERPL-MCNC: 103 MG/DL (ref 70–99)
HCT VFR BLD AUTO: 34.6 % (ref 42–52)
HGB BLD-MCNC: 11.5 G/DL (ref 14–18)
IMM GRANULOCYTES # BLD: 0 K/UL
LYMPHOCYTES # BLD: 1.3 K/UL (ref 1.1–4.5)
LYMPHOCYTES NFR BLD: 20.7 % (ref 20–40)
MCH RBC QN AUTO: 30.4 PG (ref 27–31)
MCHC RBC AUTO-ENTMCNC: 33.2 G/DL (ref 33–37)
MCV RBC AUTO: 91.5 FL (ref 80–94)
MONOCYTES # BLD: 0.7 K/UL (ref 0–0.9)
MONOCYTES NFR BLD: 10.6 % (ref 0–10)
NEUTROPHILS # BLD: 4.1 K/UL (ref 1.5–7.5)
NEUTS SEG NFR BLD: 64.3 % (ref 50–65)
PLATELET # BLD AUTO: 160 K/UL (ref 130–400)
PMV BLD AUTO: 10.9 FL (ref 9.4–12.4)
POTASSIUM SERPL-SCNC: 4.7 MMOL/L (ref 3.5–5.1)
RBC # BLD AUTO: 3.78 M/UL (ref 4.7–6.1)
SODIUM SERPL-SCNC: 139 MMOL/L (ref 136–145)
WBC # BLD AUTO: 6.4 K/UL (ref 4.8–10.8)

## 2025-07-07 PROCEDURE — 85025 COMPLETE CBC W/AUTO DIFF WBC: CPT

## 2025-07-07 PROCEDURE — 93005 ELECTROCARDIOGRAM TRACING: CPT | Performed by: ANESTHESIOLOGY

## 2025-07-07 PROCEDURE — 80048 BASIC METABOLIC PNL TOTAL CA: CPT

## 2025-07-07 RX ORDER — CLOTRIMAZOLE AND BETAMETHASONE DIPROPIONATE 10; .64 MG/G; MG/G
1 CREAM TOPICAL 2 TIMES DAILY PRN
COMMUNITY
Start: 2025-06-25

## 2025-07-07 NOTE — DISCHARGE INSTRUCTIONS
The day before surgery you will receive a phone call from the surgery nurse to let you know what time to arrive on the day of surgery. This call will usually be between 2-4 PM. If you do not receive a phone call by 4 PM the day before your surgery please call [phone] and let them know you have not received an arrival time. If your surgery is on Monday, your call will be on the Friday before your Monday surgery. Please check your voicemail as they may leave a message with that information.  If you are running late or wake up sick the day of surgery please call the above number for further instructions.        The morning of surgery, you may take all your prescribed medications with a sip of water unless instructed not to take.  Any exceptions to this would be listed below:  Always follow your surgeon or providers instructions on taking blood thinners (aspirin, plavix).    HOLD your ramapril the morning of surgery.    Do not take your glipizede the morning of surgery.    Make sure to take your carvedilol the morning of surgery (if  you normally take it).      PREOPERATIVE GUIDELINES WHEN RECEIVING ANESTHESIA    Do not eat anything after midnight the night before your surgery. You may have water up to 2 hours before your arrival time. No gum or candy the morning of surgery.  This is extremely important for your safety.    Take a bath (or shower) the night before your surgery and you may brush your teeth the morning of your surgery.    Morning of surgery no Nicotine products including smoking, vaping, patches, dip or snuff.     You will be scheduled to arrive at the hospital 2 hours before your surgery, or follow your surgeon's instructions.    Dress comfortably.  Wear loose clothing that will be easy to remove and comfortable for your trip home.    You may wear eyeglasses but bring your cases with you as they must be remove before your surgery. If you wear contacts they will have to be removed before your

## 2025-07-08 LAB
EKG P AXIS: 41 DEGREES
EKG P-R INTERVAL: 132 MS
EKG Q-T INTERVAL: 450 MS
EKG QRS DURATION: 152 MS
EKG QTC CALCULATION (BAZETT): 455 MS
EKG T AXIS: 107 DEGREES

## 2025-07-08 PROCEDURE — 93010 ELECTROCARDIOGRAM REPORT: CPT | Performed by: INTERNAL MEDICINE

## 2025-07-16 ENCOUNTER — ANESTHESIA (OUTPATIENT)
Dept: OPERATING ROOM | Age: 89
End: 2025-07-16
Payer: MEDICARE

## 2025-07-16 ENCOUNTER — APPOINTMENT (OUTPATIENT)
Dept: GENERAL RADIOLOGY | Age: 89
End: 2025-07-16
Attending: UROLOGY
Payer: MEDICARE

## 2025-07-16 ENCOUNTER — ANESTHESIA EVENT (OUTPATIENT)
Dept: OPERATING ROOM | Age: 89
End: 2025-07-16
Payer: MEDICARE

## 2025-07-16 ENCOUNTER — HOSPITAL ENCOUNTER (OUTPATIENT)
Age: 89
Setting detail: OUTPATIENT SURGERY
Discharge: HOME | End: 2025-07-16
Attending: UROLOGY | Admitting: UROLOGY
Payer: MEDICARE

## 2025-07-16 VITALS
HEART RATE: 67 BPM | WEIGHT: 187 LBS | OXYGEN SATURATION: 100 % | BODY MASS INDEX: 28.34 KG/M2 | RESPIRATION RATE: 20 BRPM | TEMPERATURE: 98 F | HEIGHT: 68 IN | SYSTOLIC BLOOD PRESSURE: 156 MMHG | DIASTOLIC BLOOD PRESSURE: 66 MMHG

## 2025-07-16 DIAGNOSIS — C67.3 MALIGNANT NEOPLASM OF ANTERIOR WALL OF URINARY BLADDER (HCC): ICD-10-CM

## 2025-07-16 DIAGNOSIS — N40.1 BPH WITH OBSTRUCTION/LOWER URINARY TRACT SYMPTOMS: ICD-10-CM

## 2025-07-16 DIAGNOSIS — N13.8 BPH WITH OBSTRUCTION/LOWER URINARY TRACT SYMPTOMS: ICD-10-CM

## 2025-07-16 LAB
GLUCOSE BLD-MCNC: 113 MG/DL (ref 70–99)
GLUCOSE BLD-MCNC: 126 MG/DL (ref 70–99)
PERFORMED ON: ABNORMAL
PERFORMED ON: ABNORMAL

## 2025-07-16 PROCEDURE — 6360000002 HC RX W HCPCS: Performed by: UROLOGY

## 2025-07-16 PROCEDURE — 2500000003 HC RX 250 WO HCPCS: Performed by: UROLOGY

## 2025-07-16 PROCEDURE — 6360000002 HC RX W HCPCS: Performed by: NURSE ANESTHETIST, CERTIFIED REGISTERED

## 2025-07-16 PROCEDURE — 7100000000 HC PACU RECOVERY - FIRST 15 MIN: Performed by: UROLOGY

## 2025-07-16 PROCEDURE — 3600000014 HC SURGERY LEVEL 4 ADDTL 15MIN: Performed by: UROLOGY

## 2025-07-16 PROCEDURE — 74420 UROGRAPHY RTRGR +-KUB: CPT

## 2025-07-16 PROCEDURE — 88305 TISSUE EXAM BY PATHOLOGIST: CPT

## 2025-07-16 PROCEDURE — 74420 UROGRAPHY RTRGR +-KUB: CPT | Performed by: UROLOGY

## 2025-07-16 PROCEDURE — C1758 CATHETER, URETERAL: HCPCS | Performed by: UROLOGY

## 2025-07-16 PROCEDURE — 82962 GLUCOSE BLOOD TEST: CPT

## 2025-07-16 PROCEDURE — 52005 CYSTO W/URTRL CATHJ: CPT | Performed by: UROLOGY

## 2025-07-16 PROCEDURE — 3700000001 HC ADD 15 MINUTES (ANESTHESIA): Performed by: UROLOGY

## 2025-07-16 PROCEDURE — 7100000001 HC PACU RECOVERY - ADDTL 15 MIN: Performed by: UROLOGY

## 2025-07-16 PROCEDURE — 3600000004 HC SURGERY LEVEL 4 BASE: Performed by: UROLOGY

## 2025-07-16 PROCEDURE — 6370000000 HC RX 637 (ALT 250 FOR IP): Performed by: UROLOGY

## 2025-07-16 PROCEDURE — 7100000011 HC PHASE II RECOVERY - ADDTL 15 MIN: Performed by: UROLOGY

## 2025-07-16 PROCEDURE — 2709999900 HC NON-CHARGEABLE SUPPLY: Performed by: UROLOGY

## 2025-07-16 PROCEDURE — 7100000010 HC PHASE II RECOVERY - FIRST 15 MIN: Performed by: UROLOGY

## 2025-07-16 PROCEDURE — 3700000000 HC ANESTHESIA ATTENDED CARE: Performed by: UROLOGY

## 2025-07-16 PROCEDURE — C1769 GUIDE WIRE: HCPCS | Performed by: UROLOGY

## 2025-07-16 PROCEDURE — 2500000003 HC RX 250 WO HCPCS: Performed by: NURSE ANESTHETIST, CERTIFIED REGISTERED

## 2025-07-16 PROCEDURE — 52234 CYSTOSCOPY AND TREATMENT: CPT | Performed by: UROLOGY

## 2025-07-16 RX ORDER — LIDOCAINE HYDROCHLORIDE 10 MG/ML
INJECTION, SOLUTION EPIDURAL; INFILTRATION; INTRACAUDAL; PERINEURAL
Status: DISCONTINUED | OUTPATIENT
Start: 2025-07-16 | End: 2025-07-16 | Stop reason: SDUPTHER

## 2025-07-16 RX ORDER — PROPOFOL 10 MG/ML
INJECTION, EMULSION INTRAVENOUS
Status: DISCONTINUED | OUTPATIENT
Start: 2025-07-16 | End: 2025-07-16 | Stop reason: SDUPTHER

## 2025-07-16 RX ORDER — HYDROMORPHONE HYDROCHLORIDE 1 MG/ML
0.5 INJECTION, SOLUTION INTRAMUSCULAR; INTRAVENOUS; SUBCUTANEOUS EVERY 5 MIN PRN
Status: DISCONTINUED | OUTPATIENT
Start: 2025-07-16 | End: 2025-07-16 | Stop reason: HOSPADM

## 2025-07-16 RX ORDER — SODIUM CHLORIDE 0.9 % (FLUSH) 0.9 %
5-40 SYRINGE (ML) INJECTION EVERY 12 HOURS SCHEDULED
Status: DISCONTINUED | OUTPATIENT
Start: 2025-07-16 | End: 2025-07-16 | Stop reason: HOSPADM

## 2025-07-16 RX ORDER — CEFDINIR 300 MG/1
300 CAPSULE ORAL 2 TIMES DAILY
Qty: 6 CAPSULE | Refills: 0 | Status: SHIPPED | OUTPATIENT
Start: 2025-07-16 | End: 2025-07-19

## 2025-07-16 RX ORDER — SODIUM CHLORIDE 9 MG/ML
INJECTION, SOLUTION INTRAVENOUS PRN
Status: DISCONTINUED | OUTPATIENT
Start: 2025-07-16 | End: 2025-07-16 | Stop reason: HOSPADM

## 2025-07-16 RX ORDER — HYDROMORPHONE HYDROCHLORIDE 1 MG/ML
0.25 INJECTION, SOLUTION INTRAMUSCULAR; INTRAVENOUS; SUBCUTANEOUS EVERY 5 MIN PRN
Status: DISCONTINUED | OUTPATIENT
Start: 2025-07-16 | End: 2025-07-16 | Stop reason: HOSPADM

## 2025-07-16 RX ORDER — ONDANSETRON 2 MG/ML
4 INJECTION INTRAMUSCULAR; INTRAVENOUS
Status: DISCONTINUED | OUTPATIENT
Start: 2025-07-16 | End: 2025-07-16 | Stop reason: HOSPADM

## 2025-07-16 RX ORDER — TAMSULOSIN HYDROCHLORIDE 0.4 MG/1
0.4 CAPSULE ORAL DAILY
Qty: 90 CAPSULE | Refills: 3 | Status: SHIPPED | OUTPATIENT
Start: 2025-07-16

## 2025-07-16 RX ORDER — SODIUM CHLORIDE 0.9 % (FLUSH) 0.9 %
5-40 SYRINGE (ML) INJECTION PRN
Status: DISCONTINUED | OUTPATIENT
Start: 2025-07-16 | End: 2025-07-16 | Stop reason: HOSPADM

## 2025-07-16 RX ORDER — FENTANYL CITRATE 50 UG/ML
INJECTION, SOLUTION INTRAMUSCULAR; INTRAVENOUS
Status: DISCONTINUED | OUTPATIENT
Start: 2025-07-16 | End: 2025-07-16 | Stop reason: SDUPTHER

## 2025-07-16 RX ORDER — OXYCODONE AND ACETAMINOPHEN 5; 325 MG/1; MG/1
1 TABLET ORAL EVERY 4 HOURS PRN
Refills: 0 | Status: DISCONTINUED | OUTPATIENT
Start: 2025-07-16 | End: 2025-07-16 | Stop reason: HOSPADM

## 2025-07-16 RX ORDER — SODIUM CHLORIDE 9 MG/ML
INJECTION, SOLUTION INTRAVENOUS CONTINUOUS
Status: DISCONTINUED | OUTPATIENT
Start: 2025-07-16 | End: 2025-07-16 | Stop reason: HOSPADM

## 2025-07-16 RX ORDER — ROCURONIUM BROMIDE 10 MG/ML
INJECTION, SOLUTION INTRAVENOUS
Status: DISCONTINUED | OUTPATIENT
Start: 2025-07-16 | End: 2025-07-16 | Stop reason: SDUPTHER

## 2025-07-16 RX ORDER — ONDANSETRON 2 MG/ML
INJECTION INTRAMUSCULAR; INTRAVENOUS
Status: DISCONTINUED | OUTPATIENT
Start: 2025-07-16 | End: 2025-07-16 | Stop reason: SDUPTHER

## 2025-07-16 RX ADMIN — LIDOCAINE HYDROCHLORIDE 50 MG: 10 INJECTION, SOLUTION EPIDURAL; INFILTRATION; INTRACAUDAL; PERINEURAL at 08:29

## 2025-07-16 RX ADMIN — FENTANYL CITRATE 50 MCG: 0.05 INJECTION, SOLUTION INTRAMUSCULAR; INTRAVENOUS at 08:29

## 2025-07-16 RX ADMIN — ONDANSETRON 4 MG: 2 INJECTION INTRAMUSCULAR; INTRAVENOUS at 09:04

## 2025-07-16 RX ADMIN — ROCURONIUM BROMIDE 40 MG: 10 INJECTION, SOLUTION INTRAVENOUS at 08:29

## 2025-07-16 RX ADMIN — FENTANYL CITRATE 50 MCG: 0.05 INJECTION, SOLUTION INTRAMUSCULAR; INTRAVENOUS at 08:53

## 2025-07-16 RX ADMIN — PROPOFOL 100 MG: 10 INJECTION, EMULSION INTRAVENOUS at 08:29

## 2025-07-16 RX ADMIN — OXYCODONE HYDROCHLORIDE AND ACETAMINOPHEN 1 TABLET: 5; 325 TABLET ORAL at 09:46

## 2025-07-16 RX ADMIN — CEFTRIAXONE SODIUM 1000 MG: 1 INJECTION, POWDER, FOR SOLUTION INTRAMUSCULAR; INTRAVENOUS at 08:33

## 2025-07-16 RX ADMIN — SUGAMMADEX 200 MG: 100 INJECTION, SOLUTION INTRAVENOUS at 09:04

## 2025-07-16 ASSESSMENT — PAIN - FUNCTIONAL ASSESSMENT
PAIN_FUNCTIONAL_ASSESSMENT: NONE - DENIES PAIN
PAIN_FUNCTIONAL_ASSESSMENT: NONE - DENIES PAIN
PAIN_FUNCTIONAL_ASSESSMENT: 0-10

## 2025-07-16 ASSESSMENT — LIFESTYLE VARIABLES: SMOKING_STATUS: 0

## 2025-07-16 ASSESSMENT — ENCOUNTER SYMPTOMS: SHORTNESS OF BREATH: 1

## 2025-07-16 ASSESSMENT — PAIN SCALES - GENERAL: PAINLEVEL_OUTOF10: 4

## 2025-07-16 ASSESSMENT — COPD QUESTIONNAIRES: CAT_SEVERITY: SEVERE

## 2025-07-16 NOTE — PROGRESS NOTES
Patient to room 3. He is alert, oriented and able to make his needs known. VSS. He denies pain at this time but related that he was having bladder spasms before he returned and now they are gone. Daughter, Gricelda at bedside.

## 2025-07-16 NOTE — OP NOTE
[id number] [place] [address]                            OPERATIVE REPORT      PATIENT NAME: NIKUNJ LYNN               : 1935  MED REC NO: [id number]                          ROOM: [facility]  ACCOUNT NO: [id number]                       ADMIT DATE: 2025  PROVIDER: Eugene Gutierrez MD      DATE OF PROCEDURE:  2025    SURGEON:  Eugene Gutierrez MD    RADIOGRAPHIC INTERPRETATION OF BILATERAL RETROGRADE PYELOGRAMS:    1. Right retrograde pyelogram:     a. Interpretation: The right ureter was opacified with contrast through a ureteral catheter.  The UVJ shows some narrowing and some J-hooking, but the ureter above this appears normal with no dilation and no filling defect. Post drain images showed good drainage without obstruction.  The renal pelvis was small and it does not completely opacify.  There is some extravasation of contrast around the renal pelvis and proximal ureter.     b. Conclusion: Normal right ureter.  Incomplete visualization of the right renal pelvis, but nothing suspicious seen. There was contrast extravasation, likely from over injection.  2. Left retrograde pyelogram:     a. Interpretation: The left ureter was opacified with contrast through a ureteral catheter. This also showed J-hooking on the left with narrowing at the UVJ.  The left ureter, however and upper collecting system including the left renal pelvis were without dilation or hydronephrosis.  There were no filling defects. No evidence of obstruction.  Post injection imaging showed good drainage.     b. Conclusion: Normal left retrograde pyelogram.          EUGENE GUTIERREZ MD      D:  2025 10:30:38     T:  2025 11:15:19     JAIRO/SCOTT  Job #:  [id number]     Doc#:  [id number]

## 2025-07-16 NOTE — DISCHARGE INSTR - ACTIVITY
Office to call with follow-up appointment. If you stewart not here from them, give them a call on Friday.

## 2025-07-16 NOTE — ANESTHESIA POSTPROCEDURE EVALUATION
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) [C67.3])    Surgeons: Eugene Avery MD Responsible Provider: Rina Peña APRN - CRNA    Anesthesia Type: General ASA Status: 4            Anesthesia Type: General    Sanjay Phase I: Sanjay Score: 9    Sanjay Phase II:      Anesthesia Post Evaluation    Patient location during evaluation: PACU  Patient participation: complete - patient participated  Level of consciousness: awake and alert  Pain score: 0  Airway patency: patent  Nausea & Vomiting: no nausea and no vomiting  Cardiovascular status: blood pressure returned to baseline  Respiratory status: acceptable, room air, nonlabored ventilation and nasal cannula  Hydration status: euvolemic  Comments: BP (!) 163/67   Pulse 72   Temp 97.3 °F (36.3 °C) (Temporal)   Resp 18   Ht 1.727 m (5' 8\")   Wt 84.8 kg (187 lb)   SpO2 97%   BMI 28.43 kg/m²     Pain management: adequate    No notable events documented.

## 2025-07-16 NOTE — OP NOTE
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) [C67.3]    Post-Op Diagnosis: Same       Procedure(s):  CYSTOSCOPY, BIOPSY AND FULGURATION OF BLADDER LESION  BILATERAL URETERAL CATHETERIZATION AND BILATERAL RETROGRADE PYELOGRAMS    Surgeon(s):  Eugene Gutierrez MD    Assistant:   * No surgical staff found *    Anesthesia: General    Estimated Blood Loss (mL): 0    Complications: None    Specimens:   ID Type Source Tests Collected by Time Destination   A : bladder dome lesion Tissue Bladder SURGICAL PATHOLOGY Eugene Gutierrez MD 7/16/2025 0901        Implants:  * No implants in log *      Drains: * No LDAs found *    Findings:  Present At Time Of Surgery (PATOS) (choose all levels that have infection present):  No infection present  Other Findings: Lateral lobe prostatic enlargement.  KAREN prostate 50 g some firmness to the left lobe without irregularity or nodularity.  Erythematous patch was pseudopapillary change anterior dome biopsy x 2 and fulgurated.  Lesion was completely fulgurated  Normal bilateral retrograde pyelograms    Detailed Description of Procedure:   See dictated report: [id number]    Disposition to PACU then to op care outpatient follow-up in 2 weeks    Electronically signed by EUGENE GUTIERREZ MD on 7/16/2025 at 9:20 AM

## 2025-07-16 NOTE — ANESTHESIA PRE PROCEDURE
Department of Anesthesiology  Preprocedure Note       Name:  Manuel Howard   Age:  89 y.o.  :  1935                                          MRN:  [id number]         Date:  2025      Surgeon: Surgeon(s):  Eugene Avery MD    Procedure: Procedure(s):  CYSTOSCOPY BIOPSY AND FULGURATION  URETERAL CATHETERIZATION AND BILATERAL RETROGRADE PYELOGRAMS    Medications prior to admission:   Prior to Admission medications    Medication Sig Start Date End Date Taking? Authorizing Provider   clotrimazole-betamethasone (LOTRISONE) 1-0.05 % cream Apply 1 each topically 2 times daily as needed (rash) 25  Yes Jamie Lundberg MD   finasteride (PROSCAR) 5 MG tablet TAKE 1 TABLET BY MOUTH EVERY DAY 25  Yes Lili Hood APRN - CNP   simvastatin (ZOCOR) 40 MG tablet TAKE 1 TABLET BY MOUTH EVERY DAY AT NIGHT 25  Yes Cielo Johns APRN   isosorbide mononitrate (IMDUR) 30 MG extended release tablet TAKE 1 TABLET BY MOUTH EVERY DAY AT NIGHT 25  Yes Cielo Johns APRN   bumetanide (BUMEX) 1 MG tablet Take 0.5 tablets by mouth daily 3/20/25  Yes Aleyda Ayala APRN   tamsulosin (FLOMAX) 0.4 MG capsule TAKE 1 CAPSULE BY MOUTH EVERY DAY  Patient taking differently: Take 1 capsule by mouth daily 24  Yes Cassandra Lyons APRN - CNP   ramipril (ALTACE) 10 MG capsule TAKE 1 CAPSULE BY MOUTH EVERY DAY  Patient taking differently: Take 1 capsule by mouth daily 24  Yes Argentina Hook APRN - NP   KLOR-CON M20 20 MEQ extended release tablet TAKE 1 TABLET BY MOUTH EVERY DAY  Patient taking differently: Take 1 tablet by mouth daily 24  Yes Argentina Hook APRN - NP   ipratropium 0.5 mg-albuterol 2.5 mg (DUONEB) 0.5-2.5 (3) MG/3ML SOLN nebulizer solution Inhale 3 mLs into the lungs every 6 hours 10/16/24  Yes Jamie Lundberg MD   oxyCODONE-acetaminophen (PERCOCET) 5-325 MG per tablet Take 1 tablet by mouth every 6 hours. 23  Yes Provider, Jamie, MD GUTIERREZU-SHAMA TORIBIO

## 2025-07-16 NOTE — DISCHARGE INSTRUCTIONS
[facility], Dr Avery    Cystoscopy / Ureteroscopy / Bladder Endoscopy Procedures Discharge Instructions      You may experience : Burning sensation when you void     A feeling of a need to go to the bathroom frequently     You may have urgent urination     Your urine may be blood tinged    These symptoms should be relieved within a few hours and days  as you increase the amounts of fluids you drink and the number of times that you empty your bladder. They may persist for 1-2 weeks if you have a stent or had a biopsy or resection.  We recommended that you drink plenty of fluid a few days after surgery.    If you have a ureteral stent he may have pain in your side or back related to the stent. Stents also cause bladder irritation symptoms of frequency and urgency.    No strenuous activities for 1 week or  until you talk to your doctor.    You may feel light headed up to 24 hours after anesthesia.  You should not do the following for the next 24 hours.       Drive a car, operate machinery or power tools     Drink any alcoholic drinks (not even beer or wine)     Make any important decisions, i.e., signing important papers.    It is recommended that you begin with clear liquids and/or light foods.  If you  Are not nauseated, progress to your normal diet.    I you are unable to urinate you should call your surgeon.    Dr. Eugene Avery

## 2025-07-16 NOTE — INTERVAL H&P NOTE
12/16/24 1426   Discharge Planning   Expected Discharge Disposition Home H   Does the patient need discharge transport arranged? No     [facility] updated that discharge orders are in place pending repeat potassium levels.      Final discharge orders and HCO sent to agency.   Update History & Physical    The patient's History and Physical of June 23, 2025 was reviewed with the patient and I examined the patient. There was no change. The surgical site was confirmed by the patient and me.     Plan: The risks, benefits, expected outcome, and alternative to the recommended procedure have been discussed with the patient. Patient understands and wants to proceed with the procedure.     Electronically signed by ONDINA GUTIERREZ MD on 7/16/2025 at 8:05 AM

## 2025-07-16 NOTE — OP NOTE
[id number] [place] [address]                            OPERATIVE REPORT      PATIENT NAME: NIKUNJ LYNN               : 1935  MED REC NO: [id number]                          ROOM: [facility]  ACCOUNT NO: [id number]                       ADMIT DATE: 2025  PROVIDER: Eugene Avery MD      DATE OF PROCEDURE:  2025    SURGEON:  Eugene Avery MD    TITLE OF OPERATION:    1. Cystoscopy, bilateral ureteral catheterizations; bilateral retrograde pyelograms.  2. Cystoscopy, biopsy, fulguration of small bladder lesion 0.5 cm.    PREOPERATIVE DIAGNOSIS:  Bladder cancer, anterior wall of the urinary bladder.    POSTOPERATIVE DIAGNOSIS:  Bladder cancer, anterior wall of the urinary bladder.    ANESTHESIA:  General anesthetic.    HISTORY:  The patient is an 89-year-old gentleman whom I follow for low-risk non muscle-invasive bladder cancer.  His last surveillance cystoscopy by me on  shows an erythematous patch with some pseudopapillary change in the anterior dome.  This appears to be about 0.5-1 cm in size.  His initial diagnosis was approximately 2 years ago in May 2023.  Therefore, we will also plan upper tract surveillance with retrograde pyelograms.  Risks and complications were discussed with him and he agrees to proceed, including the risk of bladder perforation, bleeding and infection, injury to the ureters.    DESCRIPTION OF PROCEDURE:  The patient was brought to the operating room, underwent general anesthetic.  He was placed in the lithotomy position. His genitalia were prepped and draped in routine sterile fashion.  He received a preoperative antibiotic.  Time-out was performed.    A 22-Latvian cystoscope was inserted in the meatus and advanced under direct vision.  He had some narrowing toward the proximal anterior and bulbar urethra, but I was able to advance the scope through this area without difficulty.

## 2025-07-16 NOTE — PROGRESS NOTES
CLINICAL PHARMACY NOTE: MEDS TO BEDS    Total # of Prescriptions Filled: 1   The following medications were delivered to the patient:  Discharge Medication List as of 7/16/2025 10:30 AM        START taking these medications    Details   cefdinir (OMNICEF) 300 MG capsule Take 1 capsule by mouth 2 times daily for 3 days, Disp-6 capsule, R-0Normal               Additional Documentation:  Delivered to patients [facility]. Paid with cash.

## 2025-07-30 ENCOUNTER — TELEPHONE (OUTPATIENT)
Dept: CARDIOLOGY CLINIC | Age: 89
End: 2025-07-30

## 2025-07-30 NOTE — TELEPHONE ENCOUNTER
Left message for patient that there are no notes where anyone has tried to call patient. Advised on message it was likely an automated call reminder for upcoming appt.

## 2025-07-31 ENCOUNTER — OFFICE VISIT (OUTPATIENT)
Dept: UROLOGY | Age: 89
End: 2025-07-31

## 2025-07-31 VITALS — HEIGHT: 68 IN | WEIGHT: 188.8 LBS | TEMPERATURE: 97.2 F | BODY MASS INDEX: 28.61 KG/M2

## 2025-07-31 DIAGNOSIS — N13.8 BPH WITH OBSTRUCTION/LOWER URINARY TRACT SYMPTOMS: ICD-10-CM

## 2025-07-31 DIAGNOSIS — Z85.51 HISTORY OF BLADDER CANCER: ICD-10-CM

## 2025-07-31 DIAGNOSIS — C67.3 MALIGNANT NEOPLASM OF ANTERIOR WALL OF URINARY BLADDER (HCC): Primary | ICD-10-CM

## 2025-07-31 DIAGNOSIS — N40.1 BPH WITH OBSTRUCTION/LOWER URINARY TRACT SYMPTOMS: ICD-10-CM

## 2025-07-31 LAB
BACTERIA URINE, POC: ABNORMAL
BILIRUBIN URINE: 0 MG/DL
BLOOD, URINE: POSITIVE
CASTS URINE, POC: 0
CLARITY, UA: CLEAR
COLOR, UA: YELLOW
CRYSTALS URINE, POC: 0
EPI CELLS URINE, POC: 0
GLUCOSE URINE: ABNORMAL
KETONES, URINE: NEGATIVE
LEUKOCYTE EST, POC: ABNORMAL
NITRITE, URINE: NEGATIVE
PH UA: 5.5 (ref 4.5–8)
PROTEIN UA: ABNORMAL
RBC URINE, POC: 10
SPECIFIC GRAVITY UA: 1.02 (ref 1–1.03)
UROBILINOGEN, URINE: ABNORMAL
WBC URINE, POC: 10
YEAST URINE, POC: 0

## 2025-07-31 NOTE — PROGRESS NOTES
Manuel Howard is a 89 y.o. male who presents today   Chief Complaint   Patient presents with    Follow-up     I am here today for my 2 weeks post op follow up.     BLADDER CANCER  Patient was first diagnosed with bladder cancer approximately 2 years(s) ago.   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 Negative  Last upper tract study: Done on 7/16/2025  Study was bilateral retrograde pyelograms done 7/16/2025 a CT urogram done on 3/30/2023     Patient has BPH on combination medical therapy with tamsulosin and finasteride.  His symptoms have been stable      Past Medical History:   Diagnosis Date    Back pain     hx of fall with fx 10 yr ago    Benign hypertension     Bladder cancer (HCC) 05/2023    Bladder neck contracture     BPH (benign prostatic hyperplasia)     CAD (coronary artery disease)     Bypass Graft---LIMA grafted to the LAD and individual saphenous vein graft placed to a diagonal branch and a saphenous vein Y-graft to the first and second obtuse marginal branches.    Cancer (HCC)     skin    COPD (chronic obstructive pulmonary disease) (HCC)     O2 @ 2-3L    Diabetes mellitus (HCC)     borderline    Fracture of T12 vertebra (HCC)     Hyperglycemia     Hyperlipidemia     Hypertension     Left bundle branch block     hx of per previous ekgs    LONG TERM ANTICOAGULENT USE     Panlobular emphysema (HCC)     Thyroid disease        Past Surgical History:   Procedure Laterality Date    CARDIAC CATHETERIZATION  04/25/2004    LEFT Heart Cath, see

## 2025-08-04 ENCOUNTER — OFFICE VISIT (OUTPATIENT)
Dept: CARDIOLOGY CLINIC | Age: 89
End: 2025-08-04
Payer: MEDICARE

## 2025-08-04 VITALS
HEART RATE: 61 BPM | WEIGHT: 187 LBS | OXYGEN SATURATION: 99 % | HEIGHT: 68 IN | DIASTOLIC BLOOD PRESSURE: 62 MMHG | SYSTOLIC BLOOD PRESSURE: 136 MMHG | BODY MASS INDEX: 28.34 KG/M2

## 2025-08-04 DIAGNOSIS — R00.1 BRADYCARDIA: ICD-10-CM

## 2025-08-04 DIAGNOSIS — I10 ESSENTIAL HYPERTENSION: ICD-10-CM

## 2025-08-04 DIAGNOSIS — I51.89 DIASTOLIC DYSFUNCTION: ICD-10-CM

## 2025-08-04 DIAGNOSIS — I25.10 CORONARY ARTERY DISEASE INVOLVING NATIVE HEART WITHOUT ANGINA PECTORIS, UNSPECIFIED VESSEL OR LESION TYPE: Primary | ICD-10-CM

## 2025-08-04 DIAGNOSIS — E78.2 MIXED HYPERLIPIDEMIA: ICD-10-CM

## 2025-08-04 DIAGNOSIS — Z95.1 HX OF CABG: ICD-10-CM

## 2025-08-04 PROCEDURE — 1123F ACP DISCUSS/DSCN MKR DOCD: CPT | Performed by: NURSE PRACTITIONER

## 2025-08-04 PROCEDURE — G8417 CALC BMI ABV UP PARAM F/U: HCPCS | Performed by: NURSE PRACTITIONER

## 2025-08-04 PROCEDURE — 99214 OFFICE O/P EST MOD 30 MIN: CPT | Performed by: NURSE PRACTITIONER

## 2025-08-04 PROCEDURE — 1160F RVW MEDS BY RX/DR IN RCRD: CPT | Performed by: NURSE PRACTITIONER

## 2025-08-04 PROCEDURE — G8427 DOCREV CUR MEDS BY ELIG CLIN: HCPCS | Performed by: NURSE PRACTITIONER

## 2025-08-04 PROCEDURE — 1159F MED LIST DOCD IN RCRD: CPT | Performed by: NURSE PRACTITIONER

## 2025-08-04 PROCEDURE — 1036F TOBACCO NON-USER: CPT | Performed by: NURSE PRACTITIONER

## (undated) DEVICE — VAGINAL PREP TRAY: Brand: MEDLINE INDUSTRIES, INC.

## (undated) DEVICE — STERILE LATEX POWDER FREE SURGICAL GLOVES WITH HYDROGEL COATING: Brand: PROTEXIS

## (undated) DEVICE — SEAL ENDO INSTR SELF SEAL UROLOGY

## (undated) DEVICE — CURAVIEW LED LARYNSCP BLDE

## (undated) DEVICE — CATHETER URET 5FR L70CM OPN END SGL LUMN INJ HUB FLEXIMA

## (undated) DEVICE — CATHETER URETH 20FR 5CC BLLN F 3 W SPEC INF CTRL BARDX

## (undated) DEVICE — PK TURNOVER RM ADV

## (undated) DEVICE — Y-TYPE TUR/BLADDER IRRIGATION SET, REGULATING CLAMP

## (undated) DEVICE — SUT VIC 4/0 P3 18IN UD VCP494H

## (undated) DEVICE — TUBE ET 8MM NSL ORAL BASIC CUF INTMED MURPHY EYE RADPQ MRK

## (undated) DEVICE — SYRINGE,PISTON,IRRIGATION,60ML,STERILE: Brand: MEDLINE

## (undated) DEVICE — GLV SURG BIOGEL M LTX PF 7 1/2

## (undated) DEVICE — SUT ETHLN 5/0 P3 18IN 698H

## (undated) DEVICE — SOLUTION IRRIG 3000ML 0.9% SOD CHL USP UROMATIC PLAS CONT

## (undated) DEVICE — PREP SOL POVIDONE/IODINE BT 4OZ

## (undated) DEVICE — GLOVE SURG SZ 75 CRM LTX FREE POLYISOPRENE POLYMER BEAD ANTI

## (undated) DEVICE — SURGICAL PROCEDURE PACK CYTOSCOPY

## (undated) DEVICE — SOLUTION IRRIG 3000ML STRL H2O USP UROMATIC PLAS CONT

## (undated) DEVICE — SPNG GZ WOVN 4X4IN 12PLY 10/BX STRL

## (undated) DEVICE — DRAINBAG,ANTI-REFLUX TOWER,L/F,2000ML,LL: Brand: MEDLINE

## (undated) DEVICE — SUT ETHLN 6/0 P3 18IN 1698G

## (undated) DEVICE — DECANTER BAG 9": Brand: MEDLINE INDUSTRIES, INC.

## (undated) DEVICE — EVACUATOR URO BLDR W/ ADPT UROVAC

## (undated) DEVICE — BAG DRNGE COMB PK

## (undated) DEVICE — CURAVIEW LED LARYNGOSCOPE BLADE & HANDLE,DISPOSABLE,MILLER 2: Brand: CURAPLEX

## (undated) DEVICE — CONE TIP URETERAL CATHETER: Brand: URETERAL CATHETER

## (undated) DEVICE — GUIDEWIRE ENDOSCP L150CM DIA0.035IN TIP 3CM PTFE NIT

## (undated) DEVICE — Device

## (undated) DEVICE — BAG URIN DRNAGE 2000ML TB L48IN NDL SAMP ANTIREFLX CHMBR DRN

## (undated) DEVICE — PK ENT HD AND NK 30